# Patient Record
Sex: FEMALE | Race: WHITE | NOT HISPANIC OR LATINO | Employment: OTHER | URBAN - METROPOLITAN AREA
[De-identification: names, ages, dates, MRNs, and addresses within clinical notes are randomized per-mention and may not be internally consistent; named-entity substitution may affect disease eponyms.]

---

## 2019-09-21 ENCOUNTER — HOSPITAL ENCOUNTER (EMERGENCY)
Facility: HOSPITAL | Age: 84
Discharge: HOME | End: 2019-09-21
Attending: EMERGENCY MEDICINE | Admitting: EMERGENCY MEDICINE
Payer: MEDICARE

## 2019-09-21 ENCOUNTER — APPOINTMENT (EMERGENCY)
Dept: RADIOLOGY | Facility: HOSPITAL | Age: 84
End: 2019-09-21
Payer: MEDICARE

## 2019-09-21 VITALS
DIASTOLIC BLOOD PRESSURE: 65 MMHG | SYSTOLIC BLOOD PRESSURE: 133 MMHG | TEMPERATURE: 98.1 F | RESPIRATION RATE: 18 BRPM | OXYGEN SATURATION: 98 % | HEART RATE: 68 BPM

## 2019-09-21 DIAGNOSIS — M25.00 HEMARTHROSIS: ICD-10-CM

## 2019-09-21 DIAGNOSIS — S89.90XA INJURY, KNEE: Primary | ICD-10-CM

## 2019-09-21 PROCEDURE — 96375 TX/PRO/DX INJ NEW DRUG ADDON: CPT

## 2019-09-21 PROCEDURE — 73564 X-RAY EXAM KNEE 4 OR MORE: CPT

## 2019-09-21 PROCEDURE — 96374 THER/PROPH/DIAG INJ IV PUSH: CPT

## 2019-09-21 PROCEDURE — 99284 EMERGENCY DEPT VISIT MOD MDM: CPT

## 2019-09-21 RX ORDER — POTASSIUM CHLORIDE 750 MG/1
20 CAPSULE, EXTENDED RELEASE ORAL DAILY
COMMUNITY

## 2019-09-21 RX ORDER — LIDOCAINE HYDROCHLORIDE AND EPINEPHRINE 10; 10 MG/ML; UG/ML
1 INJECTION, SOLUTION INFILTRATION; PERINEURAL ONCE
Status: COMPLETED | OUTPATIENT
Start: 2019-09-21 | End: 2019-09-21

## 2019-09-21 RX ORDER — FUROSEMIDE 40 MG/1
40 TABLET ORAL 2 TIMES DAILY
Status: ON HOLD | COMMUNITY
End: 2021-07-12 | Stop reason: SDUPTHER

## 2019-09-21 RX ORDER — LORAZEPAM 2 MG/ML
0.5 INJECTION INTRAMUSCULAR ONCE
Status: COMPLETED | OUTPATIENT
Start: 2019-09-21 | End: 2019-09-21

## 2019-09-21 RX ADMIN — MORPHINE SULFATE 2 MG: 2 INJECTION, SOLUTION INTRAMUSCULAR; INTRAVENOUS at 08:25

## 2019-09-21 RX ADMIN — MORPHINE SULFATE 2 MG: 2 INJECTION, SOLUTION INTRAMUSCULAR; INTRAVENOUS at 08:48

## 2019-09-21 RX ADMIN — LIDOCAINE HYDROCHLORIDE AND EPINEPHRINE 1 ML: 10; 10 INJECTION, SOLUTION INFILTRATION; PERINEURAL at 11:30

## 2019-09-21 RX ADMIN — LORAZEPAM 0.5 MG: 2 INJECTION INTRAMUSCULAR; INTRAVENOUS at 09:37

## 2019-09-21 NOTE — ED NOTES
X ray at the bedside  Patient continue to thrash and moan in pain  Order for additional pain medication obtained       Padmini Santos RN  09/21/19 2485

## 2019-09-21 NOTE — ED PROVIDER NOTES
History  Chief Complaint   Patient presents with    Knee Injury     pt c/o l knee pain started this am, pt last seen fine walking normal was 0400 today per ems  pt lives with daughter and caregiver that stays with pt at night  Patient lives at home and has an overnight caretaker  Patient reportedly was up and ambulatory to the bathroom at 0400 hours today  This morning caregiver noted pain and swelling in the left knee  Patient states he has been that way for about an hour  She denies any fall or injury  She has no pain in any other extremity  She has not had a fever any recent rashes  Prior to Admission Medications   Prescriptions Last Dose Informant Patient Reported? Taking? DULoxetine HCl (CYMBALTA PO)   Yes Yes   Sig: Take 60 mg by mouth daily at bedtime   METOPROLOL TARTRATE PO   Yes Yes   Sig: Take 50 mg by mouth daily at bedtime   MIRTAZAPINE PO   Yes Yes   Sig: Take 7 5 mg by mouth every evening    furosemide (LASIX) 40 mg tablet   Yes Yes   Sig: Take 40 mg by mouth 2 (two) times a day   potassium chloride (MICRO-K) 10 MEQ CR capsule   Yes Yes   Sig: Take 20 mEq by mouth daily      Facility-Administered Medications: None       No past medical history on file  No past surgical history on file  No family history on file  I have reviewed and agree with the history as documented  Social History     Tobacco Use    Smoking status: Never Smoker   Substance Use Topics    Alcohol use: Never     Frequency: Never    Drug use: Never        Review of Systems   Constitutional: Negative for chills and fever  HENT: Negative for congestion and sore throat  Eyes: Negative for visual disturbance  Respiratory: Negative for shortness of breath  Cardiovascular: Negative for chest pain  Gastrointestinal: Negative for abdominal pain  Genitourinary: Negative for dysuria  Musculoskeletal: Positive for arthralgias and joint swelling  Negative for neck pain  Skin: Negative for rash  Neurological: Positive for weakness  Negative for numbness  Hematological: Does not bruise/bleed easily  Psychiatric/Behavioral: Positive for confusion  All other systems reviewed and are negative  Physical Exam  Physical Exam   Constitutional: She appears well-developed and well-nourished  HENT:   Head: Normocephalic and atraumatic  Mouth/Throat: Oropharynx is clear and moist    Eyes: Conjunctivae are normal    Neck: Normal range of motion  Neck supple  Cardiovascular: Normal rate, regular rhythm and normal heart sounds  Pulmonary/Chest: Effort normal    Abdominal: Soft  Bowel sounds are normal  There is no tenderness  Musculoskeletal: She exhibits tenderness and deformity  There is a swollen tender deformity of the left knee  Patient will not allow range of motion at the knee due to pain  The foot is neurovascularly intact   Neurological: She is alert  Skin: Skin is warm and dry  Capillary refill takes less than 2 seconds  Psychiatric: Her behavior is normal    Patient is anxious and hyperventilates at times but is able to control it herself   Nursing note and vitals reviewed        Vital Signs  ED Triage Vitals   Temperature Pulse Respirations Blood Pressure SpO2   09/21/19 0819 09/21/19 0819 09/21/19 0819 09/21/19 0819 09/21/19 0819   98 1 °F (36 7 °C) 92 20 (!) 207/92 98 %      Temp Source Heart Rate Source Patient Position - Orthostatic VS BP Location FiO2 (%)   09/21/19 0819 09/21/19 0819 09/21/19 0819 09/21/19 0819 --   Tympanic Monitor Sitting Right arm       Pain Score       09/21/19 0825       Worst Possible Pain           Vitals:    09/21/19 0819 09/21/19 0850 09/21/19 1022   BP: (!) 207/92 (!) 182/86 155/72   Pulse: 92 70 79   Patient Position - Orthostatic VS: Sitting Lying Lying         Visual Acuity      ED Medications  Medications   lidocaine-epinephrine (XYLOCAINE/EPINEPHRINE) 1 %-1:100,000 injection 1 mL (has no administration in time range)   morphine injection 2 mg (2 mg Intravenous Given 9/21/19 0119)   morphine injection 2 mg (2 mg Intravenous Given 9/21/19 1987)   LORazepam (ATIVAN) 2 mg/mL injection 0 5 mg (0 5 mg Intravenous Given 9/21/19 5920)       Diagnostic Studies  Results Reviewed     None                 XR knee 4+ views left injury   Final Result by Matteo Jaimes MD (09/21 0912)      No acute osseous abnormality  Large joint effusion  Tricompartmental degenerative and or CPPD arthropathy  Workstation performed: [id number]                    Procedures  Arthrocentesis  Date/Time: 9/21/2019 11:00 AM  Performed by: Yanique White MD  Authorized by: Yanique White MD     Location:  Bedside  Verbal consent obtained?: Yes    Risks and benefits: Risks, benefits and alternatives were discussed    Consent given by:  Guardian  Patient identity confirmed:  Arm band  Indications:  Joint swelling and pain  Body area:  Knee  Joint:  Left knee  Joint:  Left knee  Joint:  Left knee  Joint:  Left knee  Joint:  Left knee  Joint:  Left knee  Joint:  Left knee  Joint:  Left knee  Joint:  Left knee  Joint:  Left knee  Joint:  Left knee  Joint:  Left knee  Joint:  Left knee  Joint:  Left knee  Joint:  Left knee  Joint:  Left knee  Joint:  Left knee  Joint:  Left knee  Joint:  Left knee  Joint:  Left knee  Joint:  Left knee  Joint:  Left knee  Joint:  Left knee  Joint:  Left knee  Joint:  Left knee  Joint:  Left knee  Joint:  Left knee  Local anesthesia used?: Yes    Anesthesia:  Local infiltration  Local anesthetic:  Lidocaine 1% with epinephrine  Sedation type:   Anxiolysis  Preparation: Patient was prepped and draped in usual sterile fashion    Needle size:  18 G  Ultrasound guidance: No    Approach:  Lateral  Aspirate amount (ml):  40  Aspirate:  Bloody  Patient tolerance:  Patient tolerated the procedure well with no immediate complications           ED Course                               MDM  Number of Diagnoses or Management Options  Diagnosis management comments: I will medicate the patient for comfort and to evaluate better including x-ray  I discussed aspiration of the joint with the caregiver because the patient remained in significant amount of pain  40 cc of bloody drainage was removed without difficulty, and with immediate relief for the patient, who fell asleep shortly after  Disposition  Final diagnoses:   Injury, knee   Hemarthrosis     Time reflects when diagnosis was documented in both MDM as applicable and the Disposition within this note     Time User Action Codes Description Comment    9/21/2019 11:14 AM Mark LEWIS Add [S89 90XA] Injury, knee     9/21/2019 11:15 AM Eloise Ji Add [M25 00] Hemarthrosis       ED Disposition     ED Disposition Condition Date/Time Comment    Discharge Stable Sat Sep 21, 2019 11:14 AM Flores Flores discharge to home/self care  Follow-up Information     Follow up With Specialties Details Why   Christiano Patton MD Family Medicine   [facility] [address]  [phone]      Ricki Uriostegui,  Orthopedic Surgery Schedule an appointment as soon as possible for a visit in 1 day  [address] [address]  [phone]            Patient's Medications   Discharge Prescriptions    No medications on file     No discharge procedures on file      ED Provider  Electronically Signed by           Meño Irizarry MD  09/21/19 2064

## 2019-09-24 ENCOUNTER — APPOINTMENT (EMERGENCY)
Dept: RADIOLOGY | Facility: HOSPITAL | Age: 84
End: 2019-09-24
Payer: MEDICARE

## 2019-09-24 ENCOUNTER — HOSPITAL ENCOUNTER (EMERGENCY)
Facility: HOSPITAL | Age: 84
Discharge: HOME | End: 2019-09-24
Attending: EMERGENCY MEDICINE | Admitting: EMERGENCY MEDICINE
Payer: MEDICARE

## 2019-09-24 VITALS
OXYGEN SATURATION: 96 % | HEART RATE: 66 BPM | SYSTOLIC BLOOD PRESSURE: 173 MMHG | RESPIRATION RATE: 20 BRPM | TEMPERATURE: 97.1 F | DIASTOLIC BLOOD PRESSURE: 76 MMHG

## 2019-09-24 DIAGNOSIS — M25.562 PAIN AND SWELLING OF LEFT KNEE: Primary | ICD-10-CM

## 2019-09-24 DIAGNOSIS — M25.462 PAIN AND SWELLING OF LEFT KNEE: Primary | ICD-10-CM

## 2019-09-24 PROCEDURE — 73564 X-RAY EXAM KNEE 4 OR MORE: CPT

## 2019-09-24 PROCEDURE — 99284 EMERGENCY DEPT VISIT MOD MDM: CPT

## 2019-09-24 RX ORDER — TRAMADOL HYDROCHLORIDE 50 MG/1
50 TABLET ORAL ONCE
Status: COMPLETED | OUTPATIENT
Start: 2019-09-24 | End: 2019-09-24

## 2019-09-24 RX ORDER — HYDROMORPHONE HYDROCHLORIDE 2 MG/1
1 TABLET ORAL ONCE
Status: DISCONTINUED | OUTPATIENT
Start: 2019-09-24 | End: 2019-09-24

## 2019-09-24 RX ORDER — SENNOSIDES 8.6 MG
650 CAPSULE ORAL EVERY 8 HOURS PRN
Qty: 30 TABLET | Refills: 0 | Status: SHIPPED | OUTPATIENT
Start: 2019-09-24

## 2019-09-24 RX ORDER — ACETAMINOPHEN 325 MG/1
650 TABLET ORAL ONCE
Status: COMPLETED | OUTPATIENT
Start: 2019-09-24 | End: 2019-09-24

## 2019-09-24 RX ORDER — HYDROMORPHONE HYDROCHLORIDE 2 MG/1
2 TABLET ORAL ONCE
Status: COMPLETED | OUTPATIENT
Start: 2019-09-24 | End: 2019-09-24

## 2019-09-24 RX ORDER — TRAMADOL HYDROCHLORIDE 50 MG/1
50 TABLET ORAL EVERY 8 HOURS PRN
Qty: 30 TABLET | Refills: 0 | Status: SHIPPED | OUTPATIENT
Start: 2019-09-24 | End: 2019-10-04

## 2019-09-24 RX ADMIN — HYDROMORPHONE HYDROCHLORIDE 2 MG: 2 TABLET ORAL at 03:11

## 2019-09-24 RX ADMIN — ACETAMINOPHEN 650 MG: 325 TABLET, FILM COATED ORAL at 02:03

## 2019-09-24 RX ADMIN — TRAMADOL HYDROCHLORIDE 50 MG: 50 TABLET, FILM COATED ORAL at 01:39

## 2019-09-24 NOTE — ED PROVIDER NOTES
History  Chief Complaint   Patient presents with    Knee Pain     EMS state pt seen here recently, had swelling of left knee, had knee drained  Family called 911 because pt complaining of knee pain all night and knee looks swollen again  Pt with hx of alzheimers, can't say what is wrong but is complaining of knee pain on arrival     27-year-old female with past medical history of advanced Alzheimer's dementia, presents to the ER with complaint of left knee pain and swelling over the past 4-5 days  Patient lives with her son and daughter-in-law who are the medical POA  Daughter-in-law and two Home Health aide helps to take care of patient's daily activities  Patient was seen at our ER on 09/21/2019 for similar complaint  At that time arthrocentesis was done by the ED attending and about 40 cc of serosanguineous fluid obtain  Patient felt better after the drainage in patient was discharged home with Ace wrap in place  Daughter-in-law did not take off the Ace wrap until earlier today when patient started to complain of increasing pain  Further history is limited due to patient's advanced dementia  Daughter cannot recall any specific falls or injury to elicit the pain initially  Daughter was told to follow up with orthopedic surgery however she did not make an appointment yet  Noted denies patient having any fevers or chills  No warm to touch or redness noted on examination of left knee  Daughter states that patient's left knee looks similar to patient's previous ED visit however it is not as significant  History provided by:  Patient      Prior to Admission Medications   Prescriptions Last Dose Informant Patient Reported? Taking?    DULoxetine HCl (CYMBALTA PO)   Yes No   Sig: Take 60 mg by mouth daily at bedtime   METOPROLOL TARTRATE PO   Yes No   Sig: Take 50 mg by mouth daily at bedtime   MIRTAZAPINE PO   Yes No   Sig: Take 7 5 mg by mouth every evening    furosemide (LASIX) 40 mg tablet   Yes No Sig: Take 40 mg by mouth 2 (two) times a day   potassium chloride (MICRO-K) 10 MEQ CR capsule   Yes No   Sig: Take 20 mEq by mouth daily      Facility-Administered Medications: None       Past Medical History:   Diagnosis Date    Alzheimer disease        History reviewed  No pertinent surgical history  History reviewed  No pertinent family history  I have reviewed and agree with the history as documented  Social History     Tobacco Use    Smoking status: Never Smoker   Substance Use Topics    Alcohol use: Never     Frequency: Never    Drug use: Never        Review of Systems   Constitutional: Negative for activity change, appetite change, chills and fever  HENT: Negative for congestion and ear pain  Eyes: Negative for pain and discharge  Respiratory: Negative for cough, chest tightness, shortness of breath, wheezing and stridor  Cardiovascular: Negative for chest pain and palpitations  Gastrointestinal: Negative for abdominal distention, abdominal pain, constipation, diarrhea and nausea  Endocrine: Negative for cold intolerance  Genitourinary: Negative for dysuria, frequency and urgency  Musculoskeletal: Positive for arthralgias  Negative for back pain  Skin: Negative for color change and rash  Allergic/Immunologic: Negative for environmental allergies and food allergies  Neurological: Negative for dizziness, weakness, numbness and headaches  Hematological: Negative for adenopathy  Psychiatric/Behavioral: Negative for agitation, behavioral problems and confusion  The patient is not nervous/anxious  All other systems reviewed and are negative  Physical Exam  Physical Exam   Constitutional: She appears well-developed and well-nourished  HENT:   Head: Normocephalic and atraumatic  Mouth/Throat: Oropharynx is clear and moist    Eyes: Conjunctivae and EOM are normal    Neck: Normal range of motion  Neck supple     Cardiovascular: Normal rate, regular rhythm, normal heart sounds and intact distal pulses  Pulmonary/Chest: Effort normal and breath sounds normal    Abdominal: Soft  Bowel sounds are normal  She exhibits no distension  There is no tenderness  Musculoskeletal: Normal range of motion  Pulses intact bilateral lower extremities  Swollen left knee noted in comparison to right knee  No erythema, warm to touch, skin changes noted on examination of left knee  Very small bruising noted to the lateral aspect of the knee from previous arthrocentesis needle insertion site  Range of motion of knee is limited secondary to pain  Neurological: She is alert  Patient is alert to self only that is baseline for her secondary to her advanced Alzheimer's dementia  Skin: Skin is warm and dry  Psychiatric: She has a normal mood and affect  Her behavior is normal  Judgment and thought content normal    Nursing note and vitals reviewed        Vital Signs  ED Triage Vitals   Temperature Pulse Respirations Blood Pressure SpO2   09/24/19 0121 09/24/19 0122 09/24/19 0122 09/24/19 0122 09/24/19 0122   (!) 97 1 °F (36 2 °C) 70 20 (!) 176/81 91 %      Temp Source Heart Rate Source Patient Position - Orthostatic VS BP Location FiO2 (%)   09/24/19 0121 -- 09/24/19 0122 09/24/19 0122 --   Tympanic  Lying Right arm       Pain Score       09/24/19 0139       Worst Possible Pain           Vitals:    09/24/19 0122 09/24/19 0130 09/24/19 0145 09/24/19 0200   BP: (!) 176/81 (!) 176/81     Pulse: 70 66 66 68   Patient Position - Orthostatic VS: Lying            Visual Acuity      ED Medications  Medications   traMADol (ULTRAM) tablet 50 mg (50 mg Oral Given 9/24/19 0139)   acetaminophen (TYLENOL) tablet 650 mg (650 mg Oral Given 9/24/19 0203)   HYDROmorphone (DILAUDID) tablet 2 mg (2 mg Oral Given 9/24/19 5801)       Diagnostic Studies  Results Reviewed     None                 XR knee 4+ views left injury    (Results Pending)              Procedures  Procedures       ED Course MDM  Number of Diagnoses or Management Options  Pain and swelling of left knee: new and requires workup  Diagnosis management comments: Obtain x-ray of left knee to rule out any acute bony abnormality  Give Tylenol and Ultram for pain       Amount and/or Complexity of Data Reviewed  Tests in the radiology section of CPT®: ordered and reviewed    Risk of Complications, Morbidity, and/or Mortality  General comments: Patient's vital signs were unremarkable in the ED  Patient is not febrile  No erythema, warm to touch noted on examination of left knee  Patient is having recurrent swelling of the left knee  At this time Ace wrap applied and patient is discharged home on p r n  Tramadol and Tylenol with follow-up to orthopedic surgery as soon as possible  I also discussed palliative care with patient's POA as patient is having worsening dementia over the past few months  At this time, daughter brittney states that she would like patient to be comfortable and her pain controlled  She also agrees that it may be time to discuss palliative care options  Patient does have an appointment with PCP later this week where daughter brittney will discuss these options  Patient's medical POA, daughter-in-law, verbalizes understanding of discharge plans  Close return instructions given to return to the ED for any worsening symptoms or concerns      Patient Progress  Patient progress: stable      Disposition  Final diagnoses:   Pain and swelling of left knee     Time reflects when diagnosis was documented in both MDM as applicable and the Disposition within this note     Time User Action Codes Description Comment    9/24/2019  2:01 AM Ayah Silverman Add [M25 562] Left knee pain     9/24/2019  2:01 AM Helen Rushing Add [M20 562,  M23 462] Pain and swelling of left knee     9/24/2019  2:02 AM Ayah Silverman Modify [A77 987,  M25 462] Pain and swelling of left knee     9/24/2019  2:02 AM Ayah Silverman Remove [M25 562] Left knee pain       ED Disposition     ED Disposition Condition Date/Time Comment    Discharge Stable Tue Sep 24, 2019  2:09 AM Barb Ibanez discharge to home with family  Follow-up Information     Follow up With Specialties Details Why Yue Childress 72 , DO Orthopedic Surgery  Please call the office in the morning to schedule an appointment as soon as possible  [address]            Patient's Medications   Discharge Prescriptions    ACETAMINOPHEN (TYLENOL) 650 MG CR TABLET    Take 1 tablet (650 mg total) by mouth every 8 (eight) hours as needed for mild pain       Start Date: 9/24/2019 End Date: --       Order Dose: 650 mg       Quantity: 30 tablet    Refills: 0    TRAMADOL (ULTRAM) 50 MG TABLET    Take 1 tablet (50 mg total) by mouth every 8 (eight) hours as needed for moderate pain for up to 10 days       Start Date: 9/24/2019 End Date: 10/4/2019       Order Dose: 50 mg       Quantity: 30 tablet    Refills: 0     No discharge procedures on file      ED Provider  Electronically Signed by           Anne Marie Lyons DO  09/24/19 608 [facility],   09/24/19 8871

## 2019-11-03 ENCOUNTER — APPOINTMENT (EMERGENCY)
Dept: RADIOLOGY | Facility: HOSPITAL | Age: 84
End: 2019-11-03
Payer: MEDICARE

## 2019-11-03 ENCOUNTER — ANESTHESIA EVENT (INPATIENT)
Dept: PERIOP | Facility: HOSPITAL | Age: 84
DRG: 115 | End: 2019-11-03
Payer: MEDICARE

## 2019-11-03 ENCOUNTER — APPOINTMENT (EMERGENCY)
Dept: RADIOLOGY | Facility: HOSPITAL | Age: 84
DRG: 115 | End: 2019-11-03
Payer: MEDICARE

## 2019-11-03 ENCOUNTER — ANESTHESIA (INPATIENT)
Dept: PERIOP | Facility: HOSPITAL | Age: 84
DRG: 115 | End: 2019-11-03
Payer: MEDICARE

## 2019-11-03 ENCOUNTER — HOSPITAL ENCOUNTER (EMERGENCY)
Facility: HOSPITAL | Age: 84
End: 2019-11-03
Attending: EMERGENCY MEDICINE | Admitting: EMERGENCY MEDICINE
Payer: MEDICARE

## 2019-11-03 ENCOUNTER — HOSPITAL ENCOUNTER (INPATIENT)
Facility: HOSPITAL | Age: 84
LOS: 3 days | Discharge: HOME | DRG: 115 | End: 2019-11-06
Attending: HOSPITALIST | Admitting: HOSPITALIST
Payer: MEDICARE

## 2019-11-03 VITALS
SYSTOLIC BLOOD PRESSURE: 122 MMHG | HEART RATE: 67 BPM | TEMPERATURE: 97.7 F | DIASTOLIC BLOOD PRESSURE: 77 MMHG | RESPIRATION RATE: 20 BRPM | WEIGHT: 188 LBS | OXYGEN SATURATION: 97 %

## 2019-11-03 DIAGNOSIS — J01.20 ACUTE ETHMOIDAL SINUSITIS, RECURRENCE NOT SPECIFIED: ICD-10-CM

## 2019-11-03 DIAGNOSIS — L02.01 FACIAL ABSCESS: ICD-10-CM

## 2019-11-03 DIAGNOSIS — L03.213 PERIORBITAL CELLULITIS OF RIGHT EYE: Primary | ICD-10-CM

## 2019-11-03 DIAGNOSIS — R59.1 LYMPHADENOPATHY: ICD-10-CM

## 2019-11-03 DIAGNOSIS — Z20.818 MRSA EXPOSURE: ICD-10-CM

## 2019-11-03 DIAGNOSIS — H05.011: Primary | ICD-10-CM

## 2019-11-03 PROBLEM — M10.9 GOUT: Status: ACTIVE | Noted: 2019-11-03

## 2019-11-03 PROBLEM — R91.1 LESION OF LUNG: Status: ACTIVE | Noted: 2019-11-03

## 2019-11-03 PROBLEM — I10 HYPERTENSION: Status: ACTIVE | Noted: 2019-11-03

## 2019-11-03 PROBLEM — M40.209 KYPHOSIS: Status: ACTIVE | Noted: 2019-11-03

## 2019-11-03 PROBLEM — I50.9 CHRONIC CONGESTIVE HEART FAILURE (HCC): Status: ACTIVE | Noted: 2019-11-03

## 2019-11-03 PROBLEM — F03.90 DEMENTIA (HCC): Status: ACTIVE | Noted: 2019-11-03

## 2019-11-03 PROBLEM — I48.91 A-FIB (HCC): Status: ACTIVE | Noted: 2019-11-03

## 2019-11-03 LAB
ALBUMIN SERPL BCP-MCNC: 3.1 G/DL (ref 3.5–5)
ALP SERPL-CCNC: 122 U/L (ref 46–116)
ALT SERPL W P-5'-P-CCNC: 14 U/L (ref 12–78)
ANION GAP SERPL CALCULATED.3IONS-SCNC: 8 MMOL/L (ref 4–13)
APTT PPP: 28 SECONDS (ref 23–37)
AST SERPL W P-5'-P-CCNC: 12 U/L (ref 5–45)
ATRIAL RATE: 61 BPM
ATRIAL RATE: 71 BPM
BASOPHILS # BLD AUTO: 0.02 THOUSANDS/ΜL (ref 0–0.1)
BASOPHILS NFR BLD AUTO: 0 % (ref 0–1)
BILIRUB SERPL-MCNC: 0.9 MG/DL (ref 0.2–1)
BUN SERPL-MCNC: 20 MG/DL (ref 5–25)
CALCIUM SERPL-MCNC: 8.8 MG/DL (ref 8.3–10.1)
CHLORIDE SERPL-SCNC: 99 MMOL/L (ref 100–108)
CO2 SERPL-SCNC: 30 MMOL/L (ref 21–32)
CREAT SERPL-MCNC: 1.08 MG/DL (ref 0.6–1.3)
EOSINOPHIL # BLD AUTO: 0.14 THOUSAND/ΜL (ref 0–0.61)
EOSINOPHIL NFR BLD AUTO: 2 % (ref 0–6)
ERYTHROCYTE [DISTWIDTH] IN BLOOD BY AUTOMATED COUNT: 13.3 % (ref 11.6–15.1)
GFR SERPL CREATININE-BSD FRML MDRD: 44 ML/MIN/1.73SQ M
GLUCOSE SERPL-MCNC: 122 MG/DL (ref 65–140)
HCT VFR BLD AUTO: 40.7 % (ref 34.8–46.1)
HGB BLD-MCNC: 12.9 G/DL (ref 11.5–15.4)
HOLD SPECIMEN: NORMAL
IMM GRANULOCYTES # BLD AUTO: 0.04 THOUSAND/UL (ref 0–0.2)
IMM GRANULOCYTES NFR BLD AUTO: 0 % (ref 0–2)
INR PPP: 0.99 (ref 0.91–1.09)
LYMPHOCYTES # BLD AUTO: 3.05 THOUSANDS/ΜL (ref 0.6–4.47)
LYMPHOCYTES NFR BLD AUTO: 34 % (ref 14–44)
MCH RBC QN AUTO: 33.5 PG (ref 26.8–34.3)
MCHC RBC AUTO-ENTMCNC: 31.7 G/DL (ref 31.4–37.4)
MCV RBC AUTO: 106 FL (ref 82–98)
MONOCYTES # BLD AUTO: 0.58 THOUSAND/ΜL (ref 0.17–1.22)
MONOCYTES NFR BLD AUTO: 6 % (ref 4–12)
NEUTROPHILS # BLD AUTO: 5.27 THOUSANDS/ΜL (ref 1.85–7.62)
NEUTS SEG NFR BLD AUTO: 58 % (ref 43–75)
NRBC BLD AUTO-RTO: 0 /100 WBCS
P AXIS: 140 DEGREES
PLATELET # BLD AUTO: 215 THOUSANDS/UL (ref 149–390)
PMV BLD AUTO: 11 FL (ref 8.9–12.7)
POTASSIUM SERPL-SCNC: 4.5 MMOL/L (ref 3.5–5.3)
PROT SERPL-MCNC: 7.7 G/DL (ref 6.4–8.2)
PROTHROMBIN TIME: 10.7 SECONDS (ref 9.8–12)
QRS AXIS: 111 DEGREES
QRS AXIS: 113 DEGREES
QRSD INTERVAL: 82 MS
QRSD INTERVAL: 88 MS
QT INTERVAL: 398 MS
QT INTERVAL: 418 MS
QTC INTERVAL: 423 MS
QTC INTERVAL: 424 MS
RBC # BLD AUTO: 3.85 MILLION/UL (ref 3.81–5.12)
SODIUM SERPL-SCNC: 137 MMOL/L (ref 136–145)
T WAVE AXIS: -2 DEGREES
T WAVE AXIS: 19 DEGREES
VENTRICULAR RATE: 62 BPM
VENTRICULAR RATE: 68 BPM
WBC # BLD AUTO: 9.1 THOUSAND/UL (ref 4.31–10.16)

## 2019-11-03 PROCEDURE — 87147 CULTURE TYPE IMMUNOLOGIC: CPT | Performed by: EMERGENCY MEDICINE

## 2019-11-03 PROCEDURE — 88365 INSITU HYBRIDIZATION (FISH): CPT | Performed by: PATHOLOGY

## 2019-11-03 PROCEDURE — 089X3ZZ DRAINAGE OF RIGHT LACRIMAL DUCT, PERCUTANEOUS APPROACH: ICD-10-PCS | Performed by: OTOLARYNGOLOGY

## 2019-11-03 PROCEDURE — 88342 IMHCHEM/IMCYTCHM 1ST ANTB: CPT | Performed by: PATHOLOGY

## 2019-11-03 PROCEDURE — 87070 CULTURE OTHR SPECIMN AEROBIC: CPT | Performed by: EMERGENCY MEDICINE

## 2019-11-03 PROCEDURE — 85610 PROTHROMBIN TIME: CPT | Performed by: EMERGENCY MEDICINE

## 2019-11-03 PROCEDURE — 88311 DECALCIFY TISSUE: CPT | Performed by: PATHOLOGY

## 2019-11-03 PROCEDURE — 93010 ELECTROCARDIOGRAM REPORT: CPT | Performed by: INTERNAL MEDICINE

## 2019-11-03 PROCEDURE — 96366 THER/PROPH/DIAG IV INF ADDON: CPT

## 2019-11-03 PROCEDURE — 88341 IMHCHEM/IMCYTCHM EA ADD ANTB: CPT | Performed by: PATHOLOGY

## 2019-11-03 PROCEDURE — 87205 SMEAR GRAM STAIN: CPT | Performed by: EMERGENCY MEDICINE

## 2019-11-03 PROCEDURE — 87040 BLOOD CULTURE FOR BACTERIA: CPT | Performed by: HOSPITALIST

## 2019-11-03 PROCEDURE — 68720 CREATE TEAR SAC DRAIN: CPT | Performed by: OTOLARYNGOLOGY

## 2019-11-03 PROCEDURE — 93005 ELECTROCARDIOGRAM TRACING: CPT

## 2019-11-03 PROCEDURE — 09BU8ZZ EXCISION OF RIGHT ETHMOID SINUS, VIA NATURAL OR ARTIFICIAL OPENING ENDOSCOPIC: ICD-10-PCS | Performed by: OTOLARYNGOLOGY

## 2019-11-03 PROCEDURE — 36415 COLL VENOUS BLD VENIPUNCTURE: CPT | Performed by: HOSPITALIST

## 2019-11-03 PROCEDURE — 80053 COMPREHEN METABOLIC PANEL: CPT | Performed by: EMERGENCY MEDICINE

## 2019-11-03 PROCEDURE — 96367 TX/PROPH/DG ADDL SEQ IV INF: CPT

## 2019-11-03 PROCEDURE — 87186 SC STD MICRODIL/AGAR DIL: CPT | Performed by: OTOLARYNGOLOGY

## 2019-11-03 PROCEDURE — 87147 CULTURE TYPE IMMUNOLOGIC: CPT | Performed by: OTOLARYNGOLOGY

## 2019-11-03 PROCEDURE — 88305 TISSUE EXAM BY PATHOLOGIST: CPT | Performed by: PATHOLOGY

## 2019-11-03 PROCEDURE — 87075 CULTR BACTERIA EXCEPT BLOOD: CPT | Performed by: OTOLARYNGOLOGY

## 2019-11-03 PROCEDURE — 85730 THROMBOPLASTIN TIME PARTIAL: CPT | Performed by: EMERGENCY MEDICINE

## 2019-11-03 PROCEDURE — 61782 SCAN PROC CRANIAL EXTRA: CPT | Performed by: OTOLARYNGOLOGY

## 2019-11-03 PROCEDURE — 87070 CULTURE OTHR SPECIMN AEROBIC: CPT | Performed by: OTOLARYNGOLOGY

## 2019-11-03 PROCEDURE — 99223 1ST HOSP IP/OBS HIGH 75: CPT | Performed by: INTERNAL MEDICINE

## 2019-11-03 PROCEDURE — 081X3Z3 BYPASS RIGHT LACRIMAL DUCT TO NASAL CAVITY, PERCUTANEOUS APPROACH: ICD-10-PCS | Performed by: OTOLARYNGOLOGY

## 2019-11-03 PROCEDURE — 85025 COMPLETE CBC W/AUTO DIFF WBC: CPT | Performed by: EMERGENCY MEDICINE

## 2019-11-03 PROCEDURE — 36415 COLL VENOUS BLD VENIPUNCTURE: CPT | Performed by: EMERGENCY MEDICINE

## 2019-11-03 PROCEDURE — 96365 THER/PROPH/DIAG IV INF INIT: CPT

## 2019-11-03 PROCEDURE — 1124F ACP DISCUSS-NO DSCNMKR DOCD: CPT | Performed by: FAMILY MEDICINE

## 2019-11-03 PROCEDURE — 87186 SC STD MICRODIL/AGAR DIL: CPT | Performed by: EMERGENCY MEDICINE

## 2019-11-03 PROCEDURE — 87205 SMEAR GRAM STAIN: CPT | Performed by: OTOLARYNGOLOGY

## 2019-11-03 PROCEDURE — 87081 CULTURE SCREEN ONLY: CPT | Performed by: HOSPITALIST

## 2019-11-03 PROCEDURE — 99285 EMERGENCY DEPT VISIT HI MDM: CPT

## 2019-11-03 PROCEDURE — 71045 X-RAY EXAM CHEST 1 VIEW: CPT

## 2019-11-03 PROCEDURE — 70488 CT MAXILLOFACIAL W/O & W/DYE: CPT

## 2019-11-03 PROCEDURE — 10060 I&D ABSCESS SIMPLE/SINGLE: CPT | Performed by: OTOLARYNGOLOGY

## 2019-11-03 PROCEDURE — 31254 NSL/SINS NDSC W/PRTL ETHMDCT: CPT | Performed by: OTOLARYNGOLOGY

## 2019-11-03 PROCEDURE — 88364 INSITU HYBRIDIZATION (FISH): CPT | Performed by: PATHOLOGY

## 2019-11-03 PROCEDURE — 99221 1ST HOSP IP/OBS SF/LOW 40: CPT | Performed by: OTOLARYNGOLOGY

## 2019-11-03 PROCEDURE — 99222 1ST HOSP IP/OBS MODERATE 55: CPT | Performed by: HOSPITALIST

## 2019-11-03 PROCEDURE — 99284 EMERGENCY DEPT VISIT MOD MDM: CPT

## 2019-11-03 RX ORDER — PROPOFOL 10 MG/ML
INJECTION, EMULSION INTRAVENOUS AS NEEDED
Status: DISCONTINUED | OUTPATIENT
Start: 2019-11-03 | End: 2019-11-03 | Stop reason: SURG

## 2019-11-03 RX ORDER — ONDANSETRON 2 MG/ML
INJECTION INTRAMUSCULAR; INTRAVENOUS AS NEEDED
Status: DISCONTINUED | OUTPATIENT
Start: 2019-11-03 | End: 2019-11-03 | Stop reason: SURG

## 2019-11-03 RX ORDER — ACETAMINOPHEN 325 MG/1
650 TABLET ORAL EVERY 6 HOURS PRN
Status: DISCONTINUED | OUTPATIENT
Start: 2019-11-03 | End: 2019-11-06 | Stop reason: HOSPADM

## 2019-11-03 RX ORDER — BACITRACIN 500 [USP'U]/G
OINTMENT OPHTHALMIC 2 TIMES DAILY
Status: DISCONTINUED | OUTPATIENT
Start: 2019-11-03 | End: 2019-11-06 | Stop reason: HOSPADM

## 2019-11-03 RX ORDER — METOPROLOL TARTRATE 50 MG/1
50 TABLET, FILM COATED ORAL
Status: DISCONTINUED | OUTPATIENT
Start: 2019-11-03 | End: 2019-11-06 | Stop reason: HOSPADM

## 2019-11-03 RX ORDER — MIRTAZAPINE 15 MG/1
15 TABLET, FILM COATED ORAL
Status: DISCONTINUED | OUTPATIENT
Start: 2019-11-03 | End: 2019-11-06 | Stop reason: HOSPADM

## 2019-11-03 RX ORDER — DULOXETIN HYDROCHLORIDE 60 MG/1
60 CAPSULE, DELAYED RELEASE ORAL
Status: DISCONTINUED | OUTPATIENT
Start: 2019-11-03 | End: 2019-11-06 | Stop reason: HOSPADM

## 2019-11-03 RX ORDER — OXYMETAZOLINE HYDROCHLORIDE 0.05 G/100ML
2 SPRAY NASAL EVERY 12 HOURS SCHEDULED
Status: DISCONTINUED | OUTPATIENT
Start: 2019-11-03 | End: 2019-11-04

## 2019-11-03 RX ORDER — BALANCED SALT SOLUTION 6.4; .75; .48; .3; 3.9; 1.7 MG/ML; MG/ML; MG/ML; MG/ML; MG/ML; MG/ML
SOLUTION OPHTHALMIC AS NEEDED
Status: DISCONTINUED | OUTPATIENT
Start: 2019-11-03 | End: 2019-11-03 | Stop reason: HOSPADM

## 2019-11-03 RX ORDER — DEXAMETHASONE SODIUM PHOSPHATE 4 MG/ML
10 INJECTION, SOLUTION INTRA-ARTICULAR; INTRALESIONAL; INTRAMUSCULAR; INTRAVENOUS; SOFT TISSUE ONCE
Status: COMPLETED | OUTPATIENT
Start: 2019-11-03 | End: 2019-11-03

## 2019-11-03 RX ORDER — FENTANYL CITRATE/PF 50 MCG/ML
25 SYRINGE (ML) INJECTION
Status: DISCONTINUED | OUTPATIENT
Start: 2019-11-03 | End: 2019-11-03 | Stop reason: HOSPADM

## 2019-11-03 RX ORDER — SODIUM CHLORIDE 9 MG/ML
INJECTION, SOLUTION INTRAVENOUS CONTINUOUS PRN
Status: DISCONTINUED | OUTPATIENT
Start: 2019-11-03 | End: 2019-11-03 | Stop reason: SURG

## 2019-11-03 RX ORDER — AMOXICILLIN AND CLAVULANATE POTASSIUM 875; 125 MG/1; MG/1
1 TABLET, FILM COATED ORAL EVERY 12 HOURS SCHEDULED
COMMUNITY
End: 2019-11-06 | Stop reason: HOSPADM

## 2019-11-03 RX ORDER — LIDOCAINE HYDROCHLORIDE AND EPINEPHRINE 10; 10 MG/ML; UG/ML
INJECTION, SOLUTION INFILTRATION; PERINEURAL AS NEEDED
Status: DISCONTINUED | OUTPATIENT
Start: 2019-11-03 | End: 2019-11-03 | Stop reason: HOSPADM

## 2019-11-03 RX ORDER — ONDANSETRON 2 MG/ML
4 INJECTION INTRAMUSCULAR; INTRAVENOUS ONCE AS NEEDED
Status: DISCONTINUED | OUTPATIENT
Start: 2019-11-03 | End: 2019-11-03 | Stop reason: HOSPADM

## 2019-11-03 RX ORDER — CLINDAMYCIN PHOSPHATE 600 MG/50ML
600 INJECTION INTRAVENOUS EVERY 8 HOURS
Status: DISCONTINUED | OUTPATIENT
Start: 2019-11-03 | End: 2019-11-06

## 2019-11-03 RX ORDER — CLINDAMYCIN PHOSPHATE 600 MG/50ML
600 INJECTION INTRAVENOUS ONCE
Status: COMPLETED | OUTPATIENT
Start: 2019-11-03 | End: 2019-11-03

## 2019-11-03 RX ORDER — METOCLOPRAMIDE HYDROCHLORIDE 5 MG/ML
5 INJECTION INTRAMUSCULAR; INTRAVENOUS ONCE AS NEEDED
Status: DISCONTINUED | OUTPATIENT
Start: 2019-11-03 | End: 2019-11-03 | Stop reason: HOSPADM

## 2019-11-03 RX ORDER — OXYMETAZOLINE HYDROCHLORIDE 0.05 G/100ML
2 SPRAY NASAL EVERY 12 HOURS SCHEDULED
Status: DISCONTINUED | OUTPATIENT
Start: 2019-11-03 | End: 2019-11-03

## 2019-11-03 RX ORDER — OXYMETAZOLINE HYDROCHLORIDE 0.05 G/100ML
SPRAY NASAL AS NEEDED
Status: DISCONTINUED | OUTPATIENT
Start: 2019-11-03 | End: 2019-11-03 | Stop reason: HOSPADM

## 2019-11-03 RX ORDER — FENTANYL CITRATE 50 UG/ML
INJECTION, SOLUTION INTRAMUSCULAR; INTRAVENOUS AS NEEDED
Status: DISCONTINUED | OUTPATIENT
Start: 2019-11-03 | End: 2019-11-03 | Stop reason: SURG

## 2019-11-03 RX ORDER — DEXAMETHASONE SODIUM PHOSPHATE 10 MG/ML
INJECTION, SOLUTION INTRAMUSCULAR; INTRAVENOUS AS NEEDED
Status: DISCONTINUED | OUTPATIENT
Start: 2019-11-03 | End: 2019-11-03 | Stop reason: SURG

## 2019-11-03 RX ORDER — SODIUM CHLORIDE 9 MG/ML
50 INJECTION, SOLUTION INTRAVENOUS CONTINUOUS
Status: DISCONTINUED | OUTPATIENT
Start: 2019-11-03 | End: 2019-11-04

## 2019-11-03 RX ORDER — MAGNESIUM HYDROXIDE 1200 MG/15ML
LIQUID ORAL AS NEEDED
Status: DISCONTINUED | OUTPATIENT
Start: 2019-11-03 | End: 2019-11-03 | Stop reason: HOSPADM

## 2019-11-03 RX ORDER — SUCCINYLCHOLINE/SOD CL,ISO/PF 100 MG/5ML
SYRINGE (ML) INTRAVENOUS AS NEEDED
Status: DISCONTINUED | OUTPATIENT
Start: 2019-11-03 | End: 2019-11-03 | Stop reason: SURG

## 2019-11-03 RX ORDER — TOBRAMYCIN AND DEXAMETHASONE 3; 1 MG/ML; MG/ML
1 SUSPENSION/ DROPS OPHTHALMIC EVERY 6 HOURS SCHEDULED
Status: DISCONTINUED | OUTPATIENT
Start: 2019-11-03 | End: 2019-11-03 | Stop reason: HOSPADM

## 2019-11-03 RX ORDER — LIDOCAINE HYDROCHLORIDE 10 MG/ML
INJECTION, SOLUTION INFILTRATION; PERINEURAL AS NEEDED
Status: DISCONTINUED | OUTPATIENT
Start: 2019-11-03 | End: 2019-11-03 | Stop reason: SURG

## 2019-11-03 RX ORDER — ONDANSETRON 2 MG/ML
4 INJECTION INTRAMUSCULAR; INTRAVENOUS EVERY 6 HOURS PRN
Status: DISCONTINUED | OUTPATIENT
Start: 2019-11-03 | End: 2019-11-06 | Stop reason: HOSPADM

## 2019-11-03 RX ADMIN — FENTANYL CITRATE 25 MCG: 50 INJECTION, SOLUTION INTRAMUSCULAR; INTRAVENOUS at 15:28

## 2019-11-03 RX ADMIN — DEXAMETHASONE SODIUM PHOSPHATE 10 MG: 10 INJECTION, SOLUTION INTRAMUSCULAR; INTRAVENOUS at 15:43

## 2019-11-03 RX ADMIN — CLINDAMYCIN PHOSPHATE 600 MG: 600 INJECTION, SOLUTION INTRAVENOUS at 06:59

## 2019-11-03 RX ADMIN — LIDOCAINE HYDROCHLORIDE 50 MG: 10 INJECTION, SOLUTION INFILTRATION; PERINEURAL at 15:04

## 2019-11-03 RX ADMIN — Medication 80 MG: at 15:04

## 2019-11-03 RX ADMIN — FENTANYL CITRATE 50 MCG: 50 INJECTION, SOLUTION INTRAMUSCULAR; INTRAVENOUS at 15:04

## 2019-11-03 RX ADMIN — PHENYLEPHRINE HYDROCHLORIDE 100 MCG: 10 INJECTION INTRAVENOUS at 15:07

## 2019-11-03 RX ADMIN — CLINDAMYCIN PHOSPHATE 600 MG: 600 INJECTION, SOLUTION INTRAVENOUS at 21:36

## 2019-11-03 RX ADMIN — SODIUM CHLORIDE: 0.9 INJECTION, SOLUTION INTRAVENOUS at 14:52

## 2019-11-03 RX ADMIN — IODIXANOL 85 ML: 320 INJECTION, SOLUTION INTRAVASCULAR at 06:54

## 2019-11-03 RX ADMIN — CLINDAMYCIN PHOSPHATE 600 MG: 600 INJECTION, SOLUTION INTRAVENOUS at 13:59

## 2019-11-03 RX ADMIN — PROPOFOL 100 MG: 10 INJECTION, EMULSION INTRAVENOUS at 15:04

## 2019-11-03 RX ADMIN — DEXTRAN 70 AND HYPROMELLOSE 2910 1 DROP: 1; 3 SOLUTION/ DROPS OPHTHALMIC at 19:34

## 2019-11-03 RX ADMIN — DEXAMETHASONE SODIUM PHOSPHATE 10 MG: 4 INJECTION, SOLUTION INTRAMUSCULAR; INTRAVENOUS at 13:55

## 2019-11-03 RX ADMIN — SODIUM CHLORIDE 50 ML/HR: 0.9 INJECTION, SOLUTION INTRAVENOUS at 13:59

## 2019-11-03 RX ADMIN — VANCOMYCIN HYDROCHLORIDE 1250 MG: 1 INJECTION, POWDER, LYOPHILIZED, FOR SOLUTION INTRAVENOUS at 03:14

## 2019-11-03 RX ADMIN — OXYMETAZOLINE HYDROCHLORIDE 2 SPRAY: 0.05 SPRAY NASAL at 22:33

## 2019-11-03 RX ADMIN — DEXTRAN 70 AND HYPROMELLOSE 2910 1 DROP: 1; 3 SOLUTION/ DROPS OPHTHALMIC at 21:41

## 2019-11-03 RX ADMIN — ONDANSETRON 4 MG: 2 INJECTION INTRAMUSCULAR; INTRAVENOUS at 15:43

## 2019-11-03 RX ADMIN — FENTANYL CITRATE 25 MCG: 50 INJECTION, SOLUTION INTRAMUSCULAR; INTRAVENOUS at 15:22

## 2019-11-03 NOTE — INTERIM OP NOTE
INCISION AND DRAINAGE  (I&D) right periorbital abscess; right dacryocystorhinostomy/dilation of nasolacrimal duct, Bilateral nasal endoscopy, right anterior ethmoidectomy, image guidance    Postoperative Note  PATIENT NAME: Capo Esquivel  : 1925  MRN: [id number]  BE OR ROOM 07    Surgery Date: 11/3/2019    Preop Diagnosis:  Abscess of right periorbital region[H05 011]  Acute right ethmoidal sinusitis, recurrence not specified [J01 20]    Post-Op Diagnosis Codes:   SAME    Procedure(s) (LRB):  Right anterior ethmoidectomy  Bilateral nasal endoscopy, image guidance  Incision and drainage right periorbital abscess  Right dacryocystorhinostomy    Surgeon(s) and Role:     * Humberto Leslie MD - Primary    Specimens:  ID Type Source Tests Collected by Time Destination   1 : right ethmoid sinus contents Tissue Ethmoid Sinus TISSUE EXAM Humberto Leslie MD 11/3/2019 1605    A : right bao orbital abscess Body Fluid Other ANAEROBIC CULTURE AND GRAM STAIN, BODY FLUID CULTURE AND GRAM STAIN Humberto Leslie MD 11/3/2019 1532        Estimated Blood Loss:   20 mL    Anesthesia Type:   General    Findings:   Right preseptal cellulitis with associated abscess including superficial and deeper components drainage, irrigated with copious normal saline, cultures taken  Right ethmoidectomy to clear adjacent concerning ethmoid air cells, no purulence encountered endonasal    Complications:   None    SIGNATURE: Humberto Leslie MD   DATE: November 3, 2019   TIME: 4:40 PM

## 2019-11-03 NOTE — ASSESSMENT & PLAN NOTE
· Patient with significant baseline dementia according being to the daughter-in-law hold the POA    Patient is confused for more time then being oriented  · Is with son and daughter-in-law daughter-in-law is the primary caregiver  · Continue home Cymbalta 60 mg HS and Remeron 15 mg HS  · Monitor for delirium and confusion  · If needed may have to consider one-to-one observer

## 2019-11-03 NOTE — ED NOTES
SLIM paged and returned page at this time   Aware that patient is in ED room 137 [facility] Reidlatrice Lindsey, [address] [place] [address]  11/03/19 1015

## 2019-11-03 NOTE — ASSESSMENT & PLAN NOTE
· CT facial bones:  Evidence of right dacrocystitis  with associated preseptal soft tissue cellulitis and 1 2 cm abscess  · Clinically stable  · No evidence of sepsis  · S/p IV Vanco & clindamycin in ED - will continue that until ID eval  · Ordered blood cultures  · Wound cultures sent at [address]  · Ophthal consulted - d/w him, he will perform his exam but rec ENT input  · Marbin Andre d/w ENT as well - consulted them, d/w them - they are on their way  · Will order steroids & afrin per ENT recs  · ID consult  · Keep NPO, slow IVF through today only  · D/w daughter in law [facility]) - ok for drainage procedure if needed

## 2019-11-03 NOTE — EMTALA/ACUTE CARE TRANSFER
[address] [address]  Dept: [phone]      EMTALA TRANSFER CONSENT    NAME Mireya Bangura                                         1925                              MRN [id number]    I have been informed of my rights regarding examination, treatment, and transfer   by Dr Priscilla Alfaro DO    Benefits: Specialized equipment and/or services available at the receiving facility (Include comment)________________________    Risks: Potential for delay in receiving treatment, Loss of IV, Increased discomfort during transfer, Possible worsening of condition or death during transfer      Consent for Transfer:  I acknowledge that my medical condition has been evaluated and explained to me by the emergency department physician or other qualified medical person and/or my attending physician, who has recommended that I be transferred to the service of  Accepting Physician: Dr Angelika Mota at [address] [place] [address] Name, [address] : [address]  The above potential benefits of such transfer, the potential risks associated with such transfer, and the probable risks of not being transferred have been explained to me, and I fully understand them  The doctor has explained that, in my case, the benefits of transfer outweigh the risks  I agree to be transferred  I authorize the performance of emergency medical procedures and treatments upon me in both transit and upon arrival at the receiving facility  Additionally, I authorize the release of any and all medical records to the receiving facility and request they be transported with me, if possible  I understand that the safest mode of transportation during a medical emergency is an ambulance and that the Hospital advocates the use of this mode of transport   Risks of traveling to the receiving facility by car, including absence of medical control, life sustaining equipment, such as oxygen, and medical personnel has been explained to me and I fully understand them  (MICHELLE CORRECT BOX BELOW)  [  ]  I consent to the stated transfer and to be transported by ambulance/helicopter  [  ]  I consent to the stated transfer, but refuse transportation by ambulance and accept full responsibility for my transportation by car  I understand the risks of non-ambulance transfers and I exonerate the Hospital and its staff from any deterioration in my condition that results from this refusal     X___________________________________________    DATE  19  TIME________  Signature of patient or legally responsible individual signing on patient behalf           RELATIONSHIP TO PATIENT_________________________          Provider Certification    NAME Fransisco Barnhart                                        [facility] 1925                              MRN [id number]    A medical screening exam was performed on the above named patient  Based on the examination:    Condition Necessitating Transfer The primary encounter diagnosis was Periorbital cellulitis of right eye  Diagnoses of Facial abscess and Lymphadenopathy were also pertinent to this visit  Patient Condition:  An emergency transfer is being made prior to stabilization due to the need for definitive care and the benefit of transfer outweighs the risk    Reason for Transfer: Level of Care needed not available at this facility    Transfer Requirements: [address] [place] [address]   · Space available and qualified personnel available for treatment as acknowledged by    · Agreed to accept transfer and to provide appropriate medical treatment as acknowledged by       Dr Sofia Vences  · Appropriate medical records of the examination and treatment of the patient are provided at the time of transfer   [address], [address] _______  · Transfer will be performed by qualified personnel from    and appropriate transfer equipment as required, including the use of necessary and appropriate life support measures  Provider Certification: I have examined the patient and explained the following risks and benefits of being transferred/refusing transfer to the patient/family:  Unanticipated needs of medical equipment and personnel during transport, Risk of worsening condition, The possibility of a transport vehicle being unavailable      Based on these reasonable risks and benefits to the patient and/or the unborn child(lina), and based upon the information available at the time of the patients examination, I certify that the medical benefits reasonably to be expected from the provision of appropriate medical treatments at another medical facility outweigh the increasing risks, if any, to the individuals medical condition, and in the case of labor to the unborn child, from effecting the transfer      X____________________________________________ DATE 11/03/19        TIME_______      ORIGINAL - SEND TO MEDICAL RECORDS   COPY - SEND WITH PATIENT DURING TRANSFER

## 2019-11-03 NOTE — EMTALA/ACUTE CARE TRANSFER
[address] [address]  Dept: [phone]      EMTALA TRANSFER CONSENT    NAME Naomie Petty                                         1925                              MRN [id number]    I have been informed of my rights regarding examination, treatment, and transfer   by Dr Lexi Robertson DO    Benefits:      Risks:        Consent for Transfer:  I acknowledge that my medical condition has been evaluated and explained to me by the emergency department physician or other qualified medical person and/or my attending physician, who has recommended that I be transferred to the service of    at    The above potential benefits of such transfer, the potential risks associated with such transfer, and the probable risks of not being transferred have been explained to me, and I fully understand them  The doctor has explained that, in my case, the benefits of transfer outweigh the risks  I agree to be transferred  I authorize the performance of emergency medical procedures and treatments upon me in both transit and upon arrival at the receiving facility  Additionally, I authorize the release of any and all medical records to the receiving facility and request they be transported with me, if possible  I understand that the safest mode of transportation during a medical emergency is an ambulance and that the Hospital advocates the use of this mode of transport  Risks of traveling to the receiving facility by car, including absence of medical control, life sustaining equipment, such as oxygen, and medical personnel has been explained to me and I fully understand them  (MICHELLE CORRECT BOX BELOW)  [  ]  I consent to the stated transfer and to be transported by ambulance/helicopter  [  ]  I consent to the stated transfer, but refuse transportation by ambulance and accept full responsibility for my transportation by car    I understand the risks of non-ambulance transfers and I exonerate the Hospital and its staff from any deterioration in my condition that results from this refusal     X___________________________________________    DATE  19  TIME________  Signature of patient or legally responsible individual signing on patient behalf           RELATIONSHIP TO PATIENT_________________________          Provider Certification    NAME Dyan Dale                                         1925                              MRN [id number]    A medical screening exam was performed on the above named patient  Based on the examination:    Condition Necessitating Transfer The primary encounter diagnosis was Periorbital cellulitis of right eye  Diagnoses of Facial abscess and Lymphadenopathy were also pertinent to this visit  Patient Condition:      Reason for Transfer:      Transfer Requirements: Facility     · Space available and qualified personnel available for treatment as acknowledged by    · Agreed to accept transfer and to provide appropriate medical treatment as acknowledged by          · Appropriate medical records of the examination and treatment of the patient are provided at the time of transfer   [facility] [address] _______  · Transfer will be performed by qualified personnel from    and appropriate transfer equipment as required, including the use of necessary and appropriate life support measures      Provider Certification: I have examined the patient and explained the following risks and benefits of being transferred/refusing transfer to the patient/family:         Based on these reasonable risks and benefits to the patient and/or the unborn child(lina), and based upon the information available at the time of the patients examination, I certify that the medical benefits reasonably to be expected from the provision of appropriate medical treatments at another medical facility outweigh the increasing risks, if any, to the individuals medical condition, and in the case of labor to the unborn child, from effecting the transfer      X____________________________________________ DATE 11/03/19        TIME_______      ORIGINAL - SEND TO MEDICAL RECORDS   COPY - SEND WITH PATIENT DURING TRANSFER

## 2019-11-03 NOTE — ED NOTES
Daughter in law came to nurses station, "Can someone tell her she can't get out of bed?" Patient asked why she was trying to get out of bed, and she stated " I need to go to the bathroom " Advised to stay in bed and placed on a bedpan with 2 assists  Patient able to follow commands and now resting in bed  Son and daughter in law went to diner and will be back        [address] [facility], [address] [place] [address]  11/03/19 2343

## 2019-11-03 NOTE — H&P
H&P- Beauty Bienenstock 12/1/1925, 80 y o  female MRN: [id number]    Unit/Bed#: ED 08 Encounter: [id number]    Primary Care Provider: Sunita Richard MD   Date and time admitted to hospital: 11/3/2019 10:05 AM        * Abscess of periorbital region, right  Assessment & Plan  · CT facial bones:  Evidence of right dacrocystitis  with associated preseptal soft tissue cellulitis and 1 2 cm abscess  · Clinically stable  · No evidence of sepsis  · S/p IV Vanco & clindamycin in ED - will continue that until ID eval  H/o MRSA bacteremia  Check MRSA nares  · Ordered blood cultures  · Wound cultures sent at [address]  · Ophthal consulted - d/w him, he will perform his exam but rec ENT input  · [address] d/w ENT as well - consulted them, d/w them - they are on their way  · Will order steroids & afrin per ENT recs - one time decadron 10 mg IV, afrin  · ID consult  · Keep NPO, slow IVF through today only  · D/w daughter in law [facility]) - ok for drainage procedure if needed    Dementia [facility])  Assessment & Plan  · Patient with significant baseline dementia according being to the daughter-in-law hold the POA    Patient is confused for more time then being oriented  · Is with son and daughter-in-law daughter-in-law is the primary caregiver  · Continue home Cymbalta 60 mg HS and Remeron 15 mg HS  · Monitor for delirium and confusion  · If needed may have to consider one-to-one observer    Lesion of lung  Assessment & Plan  · H/o LLL lesion, couldn't be biopsed  · Has disappeared with time per daughter in law    MRSA exposure  Assessment & Plan  · H/o MRSA infection in past, sounds like bacteremia of unclear source  · needing 6 week IV Vanco  · They couldn't do MRI of spine  · They suspected it was in spine    A-fib [facility])  Assessment & Plan  · Currently controlled on current regimen  · Cont metoprolol tartarate 50 mg HS  · Not been on [facility]    Chronic congestive heart failure [facility])  Assessment & Plan  Wt Readings from Last 3 Encounters:   11/03/19 85 3 kg (188 lb 0 8 oz)   11/03/19 85 3 kg (188 lb)   11/03/19 85 3 kg (188 lb 0 8 oz)       · H/o volume overload in past  · No echo available to determine systolic or diastolic  · On lasix 40 BID at home with PO potassium  · hold them both for now  · Has doctors primarily at [facility]      Hypertension  Assessment & Plan  · Cont home metoprolol tartarate 50 mg HS  · Lasix on hold as NPO & infection - restart soon            History and Physical - [facility] Internal Medicine    Patient Information: Shelia Eckert 80 y o  female MRN: [id number]  Unit/Bed#: ED 08 Encounter: [id number]  Admitting Physician: Dimas Villanueva MD  PCP: Tana Gary MD  Date of Admission:  11/03/19    Assessment/Plan:    Hospital Problem List:     Principal Problem:    Abscess of periorbital region, right  Active Problems:    Hypertension    Kyphosis    Gout    Chronic congestive heart failure (HCC)    A-fib ([facility] )    MRSA exposure    Lesion of lung    Dementia ([facility] )        VTE Prophylaxis: Enoxaparin (Lovenox)  / sequential compression device   Code Status: DNR  POLST: There is no POLST form on file for this patient (pre-hospital)    Anticipated Length of Stay:  Patient will be admitted on an Inpatient basis with an anticipated length of stay of  > 2 midnights  Justification for Hospital Stay:  Preorbital abscess    Total Time for Visit, including Counseling / Coordination of Care: 45 minutes  Greater than 50% of this total time spent on direct patient counseling and coordination of care  Chief Complaint:   Transferred from [address] [place] [address] ED where she presented with worsening right eye swelling    History of Present Illness:    Shelia Eckert is a 80 y o  female who presents from [facility] emergency room where she presented early this morning due to worsening right eye swelling    Patient has severe significant baseline dementia, lives with son and daughter-in-law, daughter-in-law is the POA and primary caregiver  Patient unable to provide any history with dementia and is hard of hearing  All the information obtained from daughter-in-law, she states that home in August patient had a bout of conjunctivitis which was treated with eyedrops and had resolved with some mild remaining erythema around the eyes since then  Subsequently she had another episode last week for which she was again prescribed antibiotics drops by PCP on  But they did not seem to be working hence they contacted the PCP who prescribed Augmentin for her but pharmacy switched to Augmentin to amoxicillin  She received 1 dose of amoxicillin last night  PCP advised to give it 24-48 hours to work, but overnight the daughter-in-law noticed that the does not improving her right eye was looking worse with more redness and pus-like discharge from her right eye hence she called the on-call line for the PCP and they recommended her to go to the emergency room here at [facility] Emergency Room CT scan of her facial bones 1st performed in the suspicion of decrease cystitis and pre orbital abscess hence the discuss the case with Ophthalmology and ENT, ENT recommended ophthalmology evaluation to evaluate her EOM & vision for which the patient was transferred here to [facility]  She received 1 dose of IV vancomycin and clindamycin at the ED  According to the daughter patient has history of hypertension, also has history of atrial fibrillation, on metoprolol which had dropped her heart rate and blood pressures in the past but currently she has been stable on the current regimen  Not on any anticoagulation  She has history of developing pedal edema for which she is on Lasix at home  Also she has anxiety that is why she is on Cymbalta and Remeron    Redness in Aug - rxed with drops, resolved but some redness always   Happened again last week, given drops - no effect, given augmentin by PCP, replaced by pharmacy to amoxicillin, got one dose, but didn't improve rather worsened hence brought in  Lives with son & daughter-in-law    Review of Systems:    Review of Systems   Unable to perform ROS: Dementia       Past Medical and Surgical History:     Past Medical History:   Diagnosis Date    Alzheimer disease ([facility] )     Anxiety     Hypertension        History reviewed  No pertinent surgical history  Meds/Allergies:    Prior to Admission medications    Medication Sig Start Date End Date Taking? Authorizing Provider   acetaminophen (TYLENOL) 650 mg CR tablet Take 1 tablet (650 mg total) by mouth every 8 (eight) hours as needed for mild pain 9/24/19   Dejuan Rushing DO   amoxicillin-clavulanate (AUGMENTIN) 875-125 mg per tablet Take 1 tablet by mouth every 12 (twelve) hours    Historical Provider, MD   DULoxetine HCl (CYMBALTA PO) Take 60 mg by mouth daily at bedtime    Historical Provider, MD   furosemide (LASIX) 40 mg tablet Take 40 mg by mouth 2 (two) times a day    Historical Provider, MD   METOPROLOL TARTRATE PO Take 50 mg by mouth daily at bedtime    Historical Provider, MD   MIRTAZAPINE PO Take 7 5 mg by mouth every evening     Historical Provider, MD   potassium chloride (MICRO-K) 10 MEQ CR capsule Take 20 mEq by mouth daily    Historical Provider, MD KRUSE have reviewed home medications with patient family member  Allergies: Allergies   Allergen Reactions    Aspirin     Caffeine     Ciprofloxacin     Crab (Diagnostic)     Shellfish-Derived Products     Strawberry C [Ascorbate]     Tomato        Social History:     Marital Status:     Occupation:   Patient Pre-hospital Living Situation:  Lives with son and daughter-in-law  Patient Pre-hospital Level of Mobility:  Active  Patient Pre-hospital Diet Restrictions:  Low-salt  Substance Use History:   Social History     Substance and Sexual Activity   Alcohol Use Never    Frequency: Never     Social History     Tobacco Use   Smoking Status Never Smoker   Smokeless Tobacco Never Used     Social History     Substance and Sexual Activity   Drug Use Never       Family History:    non-contributory    Physical Exam:     Vitals:   Blood Pressure: 130/58 (11/03/19 1014)  Pulse: 66 (11/03/19 1014)  Temperature: 98 4 °F (36 9 °C) (11/03/19 1014)  Temp Source: Oral (11/03/19 1014)  Respirations: 20 (11/03/19 1014)  Height: 5' 5" (165 1 cm) (11/03/19 1014)  Weight - Scale: 85 3 kg (188 lb 0 8 oz) (11/03/19 1014)  SpO2: 92 % (11/03/19 1015)    Physical Exam   Constitutional: She appears well-developed and well-nourished  HENT:   Head: Normocephalic and atraumatic  Eyes: Conjunctivae are normal  Right eye exhibits discharge  Left eye exhibits no discharge  Significant erythema involving right periorbital region as well as extending to the right maxillary skin area  Significant pus-like discharge from her right eye, with difficulty opening her right eye   Cardiovascular: Normal rate and regular rhythm  Exam reveals no friction rub  No murmur heard  Pulmonary/Chest: Effort normal and breath sounds normal  No stridor  No respiratory distress  Abdominal: Soft  She exhibits no distension  There is no tenderness  Musculoskeletal: She exhibits no tenderness  Neurological: She is alert  Demented not oriented   Vitals reviewed  Additional Data:     Lab Results: I have personally reviewed pertinent reports  Results from last 7 days   Lab Units 11/03/19  0203   WBC Thousand/uL 9 10   HEMOGLOBIN g/dL 12 9   HEMATOCRIT % 40 7   PLATELETS Thousands/uL 215   NEUTROS PCT % 58   LYMPHS PCT % 34   MONOS PCT % 6   EOS PCT % 2     Results from last 7 days   Lab Units 11/03/19  0203   POTASSIUM mmol/L 4 5   CHLORIDE mmol/L 99*   CO2 mmol/L 30   BUN mg/dL 20   CREATININE mg/dL 1 08   CALCIUM mg/dL 8 8   ALK PHOS U/L 122*   ALT U/L 14   AST U/L 12     Results from last 7 days   Lab Units 11/03/19  0203   INR  0 99       Imaging: I have personally reviewed pertinent reports        Ct Facial Bones Without Contrast    Result Date: 11/3/2019  Narrative: CT FACIAL BONES WITHOUT INTRAVENOUS CONTRAST INDICATION:   right periorbital cellulitis  Pt has purulent drainage from her right eye  swelling and gross redness around the right eye extending over the bridge of the nose and spreading to the left side of the left face  As per family, redness for a  week and drainage/swelling began a few hours ago  Pt in ER with EMS with c/o facial cellulitis  Per pt's family, pt was diagnosed with conjunctivitis, treated with Polytrim eyedrops with resolution  Pt with recurrent symptoms a few days ago, restarted on Polytrim  Family called PCP and informed them of worsening symptoms, and pt was started on Augmentin  Pt now with abscess to medial portion of right eye with copious amounts of purulent drainage  Pt also with facial cellulitis extending from supraorbital region to maxilla  Family deny fevers/chills  Pt is a poor historian due to a hx of Alzheimer's dementia  COMPARISON: None available  TECHNIQUE:  Axial CT images were obtained through the facial bones with additional sagittal and coronal reconstructions  Radiation dose length product (DLP) for this visit:  772 53 mGy-cm   This examination, like all CT scans performed in the [facility], was performed utilizing techniques to minimize radiation dose exposure, including the use of iterative  reconstruction and automated exposure control  IMAGE QUALITY:  Diagnostic  FINDINGS: FACIAL BONES:  There is no evidence of acute facial bone fracture  Normal alignment of the temporomandibular joints  ORBITS AND SINUSES:  There is skin thickening and soft tissue swelling overlying the right orbit and extending to the right forehead and right cheek, as well as the nose  There is abnormal soft tissue density medial to the right globe, located between the right globe and the right side of the nose, which measures approximately 2 4 x 3 x 2 5 cm    Although evaluation is limited without intravenous contrast, the appearance is suspicious for abscess  This is extraconal in location and is likely preseptal, however, extension posterior to the septum cannot be entirely excluded without intravenous contrast   This deviates the right globe laterally slightly  The extraocular muscles appear symmetric bilaterally, as do the optic nerves  The intraconal fat appears unremarkable  There is partial opacification of the ethmoids, most pronounced anteriorly on the right  There is absence of a portion of the lateral cortex of one of the right anterior ethmoid air cells suggesting that ethmoid sinus disease may be the cause of the infection; this is best demonstrated on axial series 9 image 93-96  The path of the right nasolacrimal duct appears mildly expanded  Lymphoma should be excluded  There is mild to moderate mucosal thickening in the right maxillary sinus and there is moderate to marked mucosal thickening in the left maxillary sinus  There is some hyperdense material within the left maxillary sinus suggesting inspissated secretions  Fungal infection should be excluded clinically  OTHER: There is supraclavicular lymphadenopathy bilaterally, right greater than left  There is also some lymphadenopathy within the mid to lower neck  There are a few small mildly hyperdense nodules within the right parotid gland  A nodule in the inferior right parotid on series 10 image 27 measures 8 x 4 mm  There is a 7 x 4 mm superficial nodule in the right parotid on series 10 image 36-37  There appears to be some asymmetric soft tissue fullness in the right pharyngeal mucosal space which appears to cause some mass effect on the right piriform sinus  Although this could be due to retained secretions, a lesion in this area should be excluded  ENT consultation and follow-up is recommended  Evaluation of the upper to mid neck is limited by dental artifact       Impression: There is right periorbital cellulitis  There is abnormal soft tissue density material medial to the right globe which measures approximately 2 4 x 3 x 2 5 cm  This is extraconal in location and is suspicious for abscess  Neoplasm, such as lymphoma, should be excluded clinically  Please see above discussion  There is bilateral supraclavicular lymphadenopathy, right greater than left  There is also some lymphadenopathy within the mid to lower neck  Evaluation of the upper to mid neck is limited by dental artifact  Small right parotid nodes  There appears to be some asymmetric soft tissue fullness in the right pharyngeal mucosal space which appears to cause some mass effect on the right piriform sinus  Evaluation of this area is limited due to lack of intravenous contrast and dental artifact  Although this could be due to retained secretions, a mass lesion in this area should be excluded  ENT consultation and follow-up is recommended  I personally discussed this study with Anish Arreola on 11/3/2019 at 5:48 AM  Workstation performed: [id number]     Ct Facial Bones With Contrast    Result Date: 11/3/2019  Narrative: CT FACIAL SOFT TISSUES WITH INTRAVENOUS CONTRAST INDICATION:   facial abscess/cellulitis  COMPARISON: Same date facial CT TECHNIQUE:  Axial images through the facial soft tissues were performed  Reformatted images were created in multiple planes  Radiation dose length product (DLP) for this visit:  398 36 mGy-cm   This examination, like all CT scans performed in the [facility], was performed utilizing techniques to minimize radiation dose exposure, including the use of iterative  reconstruction and automated exposure control  IV Contrast:  85 mL of iodixanol (VISIPAQUE) IMAGE QUALITY:  Diagnostic  FINDINGS: FACIAL BONES: There is no facial bone fracture identified  No discrete lytic or blastic lesion  No destructive lesions   ORBITS: There is inflamed right lacrimal cyst and nasolacrimal duct with associated preseptal fluid collection measuring approximately 1 1 x 1 2 cm (series 2 image 57)  There is adjacent soft tissue swelling  There is no appreciable post septal extension of inflammatory changes  Left orbit is unremarkable  SOFT TISSUES: Right preseptal soft tissue swelling of fluid collection as described above  SINUSES: There is moderate to severe left and mild right axilla sinuses mucosal thickening  Mild inflammatory mucosal thickening of anterior ethmoidal cells  Evaluation of the oral cavity is limited by streak artifact from a total work  Partially imaged right greater than left supraclavicular lymphadenopathy     Impression: 1  Evidence of right dacrocystitis  with associated preseptal soft tissue cellulitis and 1 2 cm abscess  No post septal extension of inflammatory changes  2   Left greater than right maxillary sinuses disease  3   Partially imaged right greater than left supraclavicular lymphadenopathy  The study was marked in [facility] for immediate notification  Workstation performed: [id number]       EKG, Pathology, and Other Studies Reviewed on Admission:   · EKG:  Ordered, pending    Epic / Care Everywhere Records Reviewed: Yes     ** Please Note: This note has been constructed using a voice recognition system   **

## 2019-11-03 NOTE — CONSULTS
This 80-year-old woman was transferred here from [facility] earlier today  She has a history of swelling and redness around the right medial canthal area  She has a history of dementia  On examination, the patient is resting comfortably but has apparent tenderness around the right eye  I am unable to assess visual acuity due to dementia  Extraocular movements are full  The right medial canthal area and upper and lower lids and cheek are erythematous and mildly edematous  There is a 1 x 2 cm mass noted in the medial canthal area consistent with an abscess in the nasolacrimal sac  There is yellow mucus discharge noted on the surface of the eye and lashes  There is 1+ injection and chemosis noted  The cornea is clear  The anterior chamber is formed  There is a well-centered posterior chamber intra-ocular lens  There is a bright red reflex and with indirect ophthalmoscopy I can appreciate an unremarkable posterior pole  The intra-ocular pressure is 16  Impression:  Right preseptal cellulitis with abscess  Case discussed with ENT  I agree with surgical drainage and IV antibiotics  Plan: Will treat empirically with antibiotic steroids topically and monitor

## 2019-11-03 NOTE — ED NOTES
Pt awaked when Son and daughter in law came back in  Knew who they were  Pt had a hard time opening her right eye due to pus accumulation  Cleaned with warm compress, able to open her eye   Moved up in bed with 2 assist, pt states, " that feels better "     Zuri Greer, RN  11/03/19 9483

## 2019-11-03 NOTE — PLAN OF CARE
Problem: Prexisting or High Potential for Compromised Skin Integrity  Goal: Skin integrity is maintained or improved  Description  INTERVENTIONS:  - Identify patients at risk for skin breakdown  - Assess and monitor skin integrity  - Assess and monitor nutrition and hydration status  - Monitor labs   - Assess for incontinence   - Turn and reposition patient  - Assist with mobility/ambulation  - Relieve pressure over bony prominences  - Avoid friction and shearing  - Provide appropriate hygiene as needed including keeping skin clean and dry  - Evaluate need for skin moisturizer/barrier cream  - Collaborate with interdisciplinary team   - Patient/family teaching  - Consider wound care consult   Outcome: Progressing     Problem: Potential for Falls  Goal: Patient will remain free of falls  Description  INTERVENTIONS:  - Assess patient frequently for physical needs  -  Identify cognitive and physical deficits and behaviors that affect risk of falls    -  [place] fall precautions as indicated by assessment   - Educate patient/family on patient safety including physical limitations  - Instruct patient to call for assistance with activity based on assessment  - Modify environment to reduce risk of injury  - Consider OT/PT consult to assist with strengthening/mobility  Outcome: Progressing     Problem: PAIN - ADULT  Goal: Verbalizes/displays adequate comfort level or baseline comfort level  Description  Interventions:  - Encourage patient to monitor pain and request assistance  - Assess pain using appropriate pain scale  - Administer analgesics based on type and severity of pain and evaluate response  - Implement non-pharmacological measures as appropriate and evaluate response  - Consider cultural and social influences on pain and pain management  - Notify physician/advanced practitioner if interventions unsuccessful or patient reports new pain  Outcome: Progressing     Problem: INFECTION - ADULT  Goal: Absence or prevention of progression during hospitalization  Description  INTERVENTIONS:  - Assess and monitor for signs and symptoms of infection  - Monitor lab/diagnostic results  - Monitor all insertion sites, i e  indwelling lines, tubes, and drains  - Monitor endotracheal if appropriate and nasal secretions for changes in amount and color  - [place] appropriate cooling/warming therapies per order  - Administer medications as ordered  - Instruct and encourage patient and family to use good hand hygiene technique  - Identify and instruct in appropriate isolation precautions for identified infection/condition  Outcome: Progressing     Problem: SAFETY ADULT  Goal: Maintain or return to baseline ADL function  Description  INTERVENTIONS:  -  Assess patient's ability to carry out ADLs; assess patient's baseline for ADL function and identify physical deficits which impact ability to perform ADLs (bathing, care of mouth/teeth, toileting, grooming, dressing, etc )  - Assess/evaluate cause of self-care deficits   - Assess range of motion  - Assess patient's mobility; develop plan if impaired  - Assess patient's need for assistive devices and provide as appropriate  - Encourage maximum independence but intervene and supervise when necessary  - Involve family in performance of ADLs  - Assess for home care needs following discharge   - Consider OT consult to assist with ADL evaluation and planning for discharge  - Provide patient education as appropriate  Outcome: Progressing  Goal: Maintain or return mobility status to optimal level  Description  INTERVENTIONS:  - Assess patient's baseline mobility status (ambulation, transfers, stairs, etc )    - Identify cognitive and physical deficits and behaviors that affect mobility  - Identify mobility aids required to assist with transfers and/or ambulation (gait belt, sit-to-stand, lift, walker, cane, etc )  - [place] fall precautions as indicated by assessment  - Record patient progress and toleration of activity level on Mobility SBAR; progress patient to next Phase/Stage  - Instruct patient to call for assistance with activity based on assessment  - Consider rehabilitation consult to assist with strengthening/weightbearing, etc   Outcome: Progressing     Problem: DISCHARGE PLANNING  Goal: Discharge to home or other facility with appropriate resources  Description  INTERVENTIONS:  - Identify barriers to discharge w/patient and caregiver  - Arrange for needed discharge resources and transportation as appropriate  - Identify discharge learning needs (meds, wound care, etc )  - Arrange for interpretive services to assist at discharge as needed  - Refer to Case Management Department for coordinating discharge planning if the patient needs post-hospital services based on physician/advanced practitioner order or complex needs related to functional status, cognitive ability, or social support system  Outcome: Progressing     Problem: Knowledge Deficit  Goal: Patient/family/caregiver demonstrates understanding of disease process, treatment plan, medications, and discharge instructions  Description  Complete learning assessment and assess knowledge base    Interventions:  - Provide teaching at level of understanding  - Provide teaching via preferred learning methods  Outcome: Progressing     Problem: SKIN/TISSUE INTEGRITY - ADULT  Goal: Skin integrity remains intact  Description  INTERVENTIONS  - Identify patients at risk for skin breakdown  - Assess and monitor skin integrity  - Assess and monitor nutrition and hydration status  - Monitor labs (i e  albumin)  - Assess for incontinence   - Turn and reposition patient  - Assist with mobility/ambulation  - Relieve pressure over bony prominences  - Avoid friction and shearing  - Provide appropriate hygiene as needed including keeping skin clean and dry  - Evaluate need for skin moisturizer/barrier cream  - Collaborate with interdisciplinary team (i e  Nutrition, Rehabilitation, etc )   - Patient/family teaching  Outcome: Progressing  Goal: Incision(s), wounds(s) or drain site(s) healing without S/S of infection  Description  INTERVENTIONS  - Assess and document risk factors for skin impairment   - Assess and document dressing, incision, wound bed, drain sites and surrounding tissue  - Consider nutrition services referral as needed  - Oral mucous membranes remain intact  - Provide patient/ family education  Outcome: Progressing

## 2019-11-03 NOTE — PROGRESS NOTES
Vancomycin IV Pharmacy-to-Dose Consultation    Karey Bueno is a 80 y o  female who is currently receiving Vancomycin IV with management by the Pharmacy Consult service  Relevant clinical data and objective history reviewed:  Creatinine   Date Value Ref Range Status   11/03/2019 1 08 0 60 - 1 30 mg/dL Final     Comment:     Standardized to IDMS reference method     /80   Pulse 71   Temp 98 4 °F (36 9 °C)   Resp 17   Ht 5' 5" (1 651 m)   Wt 85 3 kg (188 lb 0 8 oz)   LMP  (LMP Unknown)   SpO2 95%   BMI 31 29 kg/m²   No intake/output data recorded  Lab Results   Component Value Date/Time    BUN 20 11/03/2019 02:03 AM    WBC 9 10 11/03/2019 02:03 AM    HGB 12 9 11/03/2019 02:03 AM    HCT 40 7 11/03/2019 02:03 AM     (H) 11/03/2019 02:03 AM     11/03/2019 02:03 AM     Temp Readings from Last 3 Encounters:   11/03/19 98 4 °F (36 9 °C)   11/03/19 97 7 °F (36 5 °C) (Tympanic)   09/24/19 (!) 97 1 °F (36 2 °C) (Tympanic)     Vancomycin Assessment:  Indication: periorbital abscess  Status: stable  Cultures:  11/3 wound culture: in process  11/3 blood cultures x 2: in process  11/3 MRSA nasal culture: pending collection  Procalcitonin:   None   Renal Function: Unclear of patient's baseline SCr; SCr this AM on arrival to ED is 1 08  Potential Nephrotoxicity Factors:  Medications: IV contrast, home diuretic use (not ordered currently)  Patient Factors: elderly  Days of Therapy: 1  Current Dose: 1,250 mg q24h, first dose given at [place] prior to transfer to [facility]  Goal Trough: 15-20  Last Level: n/a     Vancomycin Plan:  Dosing: Continue with 1,250 mg q24h  Next Level: Will check a steady state level on 11/6 @ 0230 prior to the 4th dose  Renal Function Monitoring: continue to monitor UOP and SCr     Pharmacy will continue to follow closely for s/sx of nephrotoxicity, infusion reactions and appropriateness of therapy  BMP and CBC will be ordered per protocol   We will continue to follow the patients culture results and clinical progress daily      Joshua Worthy, PharmD, [address] [facility] Clinical Pharmacist  [phone] or via [address]

## 2019-11-03 NOTE — PROGRESS NOTES
S/p I&D right periorbital abscess, right anterior ethmoidectomy    Plan:  Continue IV abx including MRSA coverage  Await cultures  Apply bacitracin ophthalmic BID to incision near medial canthus on the right  Artifical tears to right eye every 2 hours while awake  Afrin to nares PRN nosebleed only  Nasal sprays to nares starting tomorrow (11/4)  If O2, use humidified via nasal cannula  Pain control with tylenol, ibuprofen if able, hydrocodone/oxycodone if able  May resume PO diet    Will follow

## 2019-11-03 NOTE — OP NOTE
OPERATIVE REPORT  PATIENT NAME: Dyan Dale    :  1925  MRN: [id number]  Pt Location: [facility] OR ROOM 07    SURGERY DATE: 11/3/2019    Surgeon(s) and Role:     * Zaina Albright MD - Primary    Preop Diagnosis:  Abscess of periorbital region, right [H05 011]  Acute ethmoidal sinusitis, recurrence not specified [J01 20]    Post-Op Diagnosis Codes:     * Abscess of periorbital region, right [H05 011]     * Acute ethmoidal sinusitis, recurrence not specified [J01 20]    Procedure(s) (LRB):  Right anterior ethmoidectomy  Bilateral nasal endoscopy, image guidance  Incision and drainage right periorbital abscess  Right dacryocystorhinostomy    Specimen(s):  ID Type Source Tests Collected by Time Destination   1 : right ethmoid sinus contents Tissue Ethmoid Sinus TISSUE EXAM Zaina Albright MD 11/3/2019 1605    A : right bao orbital abscess Body Fluid Other ANAEROBIC CULTURE AND GRAM STAIN, BODY FLUID CULTURE AND GRAM STAIN Zaina Albright MD 11/3/2019 1532        Estimated Blood Loss:   20 mL    Drains:  * No LDAs found *    Anesthesia Type:   Choice    Operative Indications:  Acute right periorbital cellulitis with abscess within the nasolacrimal sac/duct  CT evidence of right ethmoid opacification and bony dehiscence as possible source of infection  Also possibility of neoplasm such as lymphoma  Operative Findings:  Right preseptal cellulitis with associated abscess including superficial and deeper components drained, irrigated with copious normal saline, cultures taken  Right ethmoidectomy to clear adjacent concerning ethmoid air cells, no purulence encountered endonasal    Complications:   None    Procedure and Technique:  The patient was identified and placed on the operating table in a supine position  General anesthesia was induced and the image guidance headset was applied and calibrated and seen to be accurate  The patient was prepped and draped in sterile fashion   Timeout was performed to confirm patient's name, ID, procedure  A small incision was made through the skin overlying the right nasolacrimal sac, purulent drainage was encountered  The sac was probed with curved hemostat and deeper pocket of purulent drainage was encountered  Cultures were sent  The area was irrigated with copious normal saline using 16 g angiocatheter  Right dacryocystorhinostomy was performed using nasolacrimal probes from 00 to size 2 through the right nasolacrimal punctum and duct  Attention was then turned to the nasal endoscopy and right anterior ethmoidectomy  Nasal cavities were decongested with oxymetazoline soaked pledgets  Injection of the right middle turbinate and uncinate performed with 1% lidocaine w 1:100,000 epi  The 30 degree nasal endoscope was used to obtain views  The left nasal passage was evaluated and appeared unremarkable  The right middle turbinate was gently medialized with freer  Suction was used with image guidance to palpate ethmoid cells that were opacified on CT  Using audrey suction, microdebrider, through cutting forceps, and J curette, the ethmoid bulla and adjacent ethmoid cells were removed including those right against the lamina papyracea  This was continued up to the skull base and to the fronto-ethmoid recess  Tissue and mucosa were sent to pathology from this location  Image guidance was used for confirmation  Once the ethmoid cells were cleared  The area was irrigated with normal saline and oxymetazoline pledgets placed for hemostasis  The nasopharynx and oropharynx were suctioned also  The patient was then awakened from general anesthesia, extubated, and transported to the PACU in stable condition  The patient tolerated this well     I was present for the entire procedure    Patient Disposition:  PACU     SIGNATURE: Brenda Richard MD  DATE: November 3, 2019  TIME: 4:44 PM

## 2019-11-03 NOTE — ASSESSMENT & PLAN NOTE
· H/o MRSA infection in past  · needing 6 week IV Vanco  · They couldn't do MRI of spine  · They suspected it was in spine

## 2019-11-03 NOTE — ANESTHESIA POSTPROCEDURE EVALUATION
Post-Op Assessment Note    CV Status:  Stable       Mental Status:  Lethargic and arousable   Hydration Status:  Stable   PONV Controlled:  None   Airway Patency:  Patent   Post Op Vitals Reviewed: Yes      Staff: Anesthesiologist, CRNA           BP   124/61   Temp   97 5   Pulse  71   Resp   18   SpO2   93

## 2019-11-03 NOTE — ED NOTES
Patient trying to get out of bed, "trying to go see the blue birds " reoriented, but still trying to get out and asked, "the blue birds in the hospital?" reoriented again and pt resting in bed       [address]  11/03/19 4513

## 2019-11-03 NOTE — ASSESSMENT & PLAN NOTE
Wt Readings from Last 3 Encounters:   11/03/19 85 3 kg (188 lb 0 8 oz)   11/03/19 85 3 kg (188 lb)   11/03/19 85 3 kg (188 lb 0 8 oz)       · H/o volume overload in past  · No echo available to determine systolic or diastolic  · On lasix 40 BID at home with PO potassium  · hold them both for now  · Has doctors primarily at [facility]

## 2019-11-03 NOTE — ED PROVIDER NOTES
History  Chief Complaint   Patient presents with    Cellulitis     Pt has purulent drainage from her right eye  swelling and gross redness around the right eye extending over the bridge of the nose and spreading to the left side of the left face  As per family, redness for a week and drainage/swelling began a few hours ago  Pt in ER with EMS with c/o facial cellulitis  Per pt's family, pt was diagnosed with conjunctivitis, treated with Polytrim eyedrops with resolution  Pt with recurrent symptoms a few days ago, restarted on Polytrim  Family called PCP and informed them of worsening symptoms, and pt was started on Augmentin  Pt now with abscess to medial portion of right eye with copious amounts of purulent drainage  Pt also with facial cellulitis extending from supraorbital region to maxilla  Family deny fevers/chills  Pt is a poor historian due to a hx of Alzheimer's dementia  History provided by:  EMS personnel and relative  History limited by:  Dementia   used: No        Prior to Admission Medications   Prescriptions Last Dose Informant Patient Reported? Taking?    DULoxetine HCl (CYMBALTA PO) 11/2/2019 at Unknown time  Yes Yes   Sig: Take 60 mg by mouth daily at bedtime   METOPROLOL TARTRATE PO 11/2/2019 at Unknown time  Yes Yes   Sig: Take 50 mg by mouth daily at bedtime   MIRTAZAPINE PO   Yes No   Sig: Take 7 5 mg by mouth every evening    acetaminophen (TYLENOL) 650 mg CR tablet Past Week at Unknown time  No Yes   Sig: Take 1 tablet (650 mg total) by mouth every 8 (eight) hours as needed for mild pain   amoxicillin-clavulanate (AUGMENTIN) 875-125 mg per tablet 11/2/2019 at Unknown time Family Member Yes Yes   Sig: Take 1 tablet by mouth every 12 (twelve) hours   furosemide (LASIX) 40 mg tablet 11/2/2019 at Unknown time  Yes Yes   Sig: Take 40 mg by mouth 2 (two) times a day   potassium chloride (MICRO-K) 10 MEQ CR capsule 11/2/2019 at Unknown time  Yes Yes   Sig: Take 20 mEq by mouth daily      Facility-Administered Medications: None       Past Medical History:   Diagnosis Date    Alzheimer disease ([address] )     Anxiety     Hypertension        No past surgical history on file  No family history on file  I have reviewed and agree with the history as documented  Social History     Tobacco Use    Smoking status: Never Smoker    Smokeless tobacco: Never Used   Substance Use Topics    Alcohol use: Never     Frequency: Never    Drug use: Never        Review of Systems   Unable to perform ROS: Dementia   Skin: Positive for color change  Physical Exam  Physical Exam   Constitutional: She appears well-developed and well-nourished  She appears distressed  HENT:   Head: Normocephalic  Eyes: Pupils are equal, round, and reactive to light  Conjunctivae are normal  Right conjunctiva is not injected  Right conjunctiva has no hemorrhage  Left conjunctiva is not injected  Left conjunctiva has no hemorrhage  Right eye exhibits normal extraocular motion  Left eye exhibits normal extraocular motion  Right pupil is reactive  Left pupil is reactive  Neck: Normal range of motion  Neck supple  Cardiovascular: Normal rate and regular rhythm  Pulmonary/Chest: Effort normal and breath sounds normal    Musculoskeletal: She exhibits no edema, tenderness or deformity  Neurological: She is alert     Skin:   Cellulitis as previously described       Vital Signs  ED Triage Vitals [11/03/19 0142]   Temperature Pulse Respirations Blood Pressure SpO2   99 °F (37 2 °C) 66 18 135/64 93 %      Temp Source Heart Rate Source Patient Position - Orthostatic VS BP Location FiO2 (%)   Tympanic Monitor Lying Left arm --      Pain Score       2           Vitals:    11/03/19 0530 11/03/19 0700 11/03/19 0848 11/03/19 0915   BP: 152/64 151/67 158/74 122/77   Pulse: 66  78 67   Patient Position - Orthostatic VS: Sitting Lying Lying Lying         Visual Acuity      ED Medications  Medications   vancomycin (VANCOCIN) 1,250 mg in sodium chloride 0 9 % 250 mL IVPB (0 mg/kg × 85 3 kg Intravenous Stopped 11/3/19 0452)   clindamycin (CLEOCIN) IVPB (premix) 600 mg (0 mg Intravenous Stopped 11/3/19 0737)   iodixanol (VISIPAQUE) 320 MG/ML injection 85 mL (85 mL Intravenous Given 11/3/19 0654)       Diagnostic Studies  Results Reviewed     Procedure Component Value Units Date/Time    Wound culture and Gram stain [[id number]] Collected:  11/03/19 0203    Lab Status:  Preliminary result Specimen:  Wound from Nose Updated:  11/03/19 1615     Gram Stain Result No Polys or Bacteria seen    Protime-INR [[id number]]  (Normal) Collected:  11/03/19 0203    Lab Status:  Final result Specimen:  Blood from Arm, Right Updated:  11/03/19 0242     Protime 10 7 seconds      INR 0 99    APTT [[id number]]  (Normal) Collected:  11/03/19 0203    Lab Status:  Final result Specimen:  Blood from Arm, Right Updated:  11/03/19 0242     PTT 28 seconds     Comprehensive metabolic panel [[id number]]  (Abnormal) Collected:  11/03/19 0203    Lab Status:  Final result Specimen:  Blood from Arm, Right Updated:  11/03/19 0235     Sodium 137 mmol/L      Potassium 4 5 mmol/L      Chloride 99 mmol/L      CO2 30 mmol/L      ANION GAP 8 mmol/L      BUN 20 mg/dL      Creatinine 1 08 mg/dL      Glucose 122 mg/dL      Calcium 8 8 mg/dL      AST 12 U/L      ALT 14 U/L      Alkaline Phosphatase 122 U/L      Total Protein 7 7 g/dL      Albumin 3 1 g/dL      Total Bilirubin 0 90 mg/dL      eGFR 44 ml/min/1 73sq m     Narrative:       [place] guidelines for Chronic Kidney Disease (CKD):     Stage 1 with normal or high GFR (GFR > 90 mL/min/1 73 square meters)    Stage 2 Mild CKD (GFR = 60-89 mL/min/1 73 square meters)    Stage 3A Moderate CKD (GFR = 45-59 mL/min/1 73 square meters)    Stage 3B Moderate CKD (GFR = 30-44 mL/min/1 73 square meters)    Stage 4 Severe CKD (GFR = 15-29 mL/min/1 73 square meters)    Stage 5 End Stage CKD (GFR <15 mL/min/1 73 square meters)  Note: GFR calculation is accurate only with a steady state creatinine    CBC and differential [[id number]]  (Abnormal) Collected:  11/03/19 0203    Lab Status:  Final result Specimen:  Blood from Arm, Right Updated:  11/03/19 0223     WBC 9 10 Thousand/uL      RBC 3 85 Million/uL      Hemoglobin 12 9 g/dL      Hematocrit 40 7 %       fL      MCH 33 5 pg      MCHC 31 7 g/dL      RDW 13 3 %      MPV 11 0 fL      Platelets 761 Thousands/uL      nRBC 0 /100 WBCs      Neutrophils Relative 58 %      Immat GRANS % 0 %      Lymphocytes Relative 34 %      Monocytes Relative 6 %      Eosinophils Relative 2 %      Basophils Relative 0 %      Neutrophils Absolute 5 27 Thousands/µL      Immature Grans Absolute 0 04 Thousand/uL      Lymphocytes Absolute 3 05 Thousands/µL      Monocytes Absolute 0 58 Thousand/µL      Eosinophils Absolute 0 14 Thousand/µL      Basophils Absolute 0 02 Thousands/µL                  CT facial bones with contrast   Final Result by Janice Grigsby MD (11/03 1681)      1  Evidence of right dacrocystitis  with associated preseptal soft tissue cellulitis and 1 2 cm abscess  No post septal extension of inflammatory changes  2   Left greater than right maxillary sinuses disease  3   Partially imaged right greater than left supraclavicular lymphadenopathy  The study was marked in [facility] for immediate notification  Workstation performed: [id number]         CT facial bones without contrast   Final Result by Kwesi Porter MD (11/03 0557)      There is right periorbital cellulitis  There is abnormal soft tissue density material medial to the right globe which measures approximately 2 4 x 3 x 2 5 cm  This is extraconal in location and is suspicious for abscess  Neoplasm, such as lymphoma,    should be excluded clinically  Please see above discussion  There is bilateral supraclavicular lymphadenopathy, right greater than left    There is also some lymphadenopathy within the mid to lower neck  Evaluation of the upper to mid neck is limited by dental artifact  Small right parotid nodes  There appears to be some asymmetric soft tissue fullness in the right pharyngeal mucosal space which appears to cause some mass effect on the right piriform sinus  Evaluation of this area is limited due to lack of intravenous contrast and dental    artifact  Although this could be due to retained secretions, a mass lesion in this area should be excluded  ENT consultation and follow-up is recommended  I personally discussed this study with Gaurav Nava on 11/3/2019 at 5:48 AM                         Workstation performed: [id number]                    Procedures  Procedures       ED Course                               MDM  Number of Diagnoses or Management Options  Facial abscess:   Lymphadenopathy:   Periorbital cellulitis of right eye:   Diagnosis management comments: D/w Dr Kamilah Dawn  He reviewed images and would like an Ophthalmology consult before he performs any surgical intervention  He recommends CT with IV contrast  He also recommends additional abx - will add Clindamycin  Plan to [facility] [place] [facility]   D/w Dr Luanne Horne       Amount and/or Complexity of Data Reviewed  Clinical lab tests: ordered and reviewed  Tests in the radiology section of CPT®: ordered and reviewed    Risk of Complications, Morbidity, and/or Mortality  Presenting problems: high  Diagnostic procedures: high  Management options: high    Patient Progress  Patient progress: stable      Disposition  Final diagnoses:   Periorbital cellulitis of right eye   Facial abscess   Lymphadenopathy     Time reflects when diagnosis was documented in both MDM as applicable and the Disposition within this note     Time User Action Codes Description Comment    11/3/2019  7:31 AM Martina Cifuentes Add [A85 410] Periorbital cellulitis of right eye     11/3/2019  7:32 AM Fatuma Mitchell Add [L02 01] Facial abscess     11/3/2019  7:32 AM Fatuma Mitchell Add [R59 1] Lymphadenopathy       ED Disposition     ED Disposition Condition Date/Time Comment    Transfer to Another Facility-In Network  [place] Nov 3, 2019  7:31 AM Kee Kaye should be transferred out to [facility]          MD Documentation      Most Recent Value   Patient Condition  An emergency transfer is being made prior to stabilization due to the need for definitive care and the benefit of transfer outweighs the risk   Reason for Transfer  Level of Care needed not available at this facility   Benefits of Transfer  Specialized equipment and/or services available at the receiving facility (Include comment)________________________   Risks of Transfer  Potential for delay in receiving treatment, Loss of IV, Increased discomfort during transfer, Possible worsening of condition or death during transfer   Accepting Physician  Dr Slade Valero Name, [facility]   Sending MD  Dr Velia Valencia   Provider Certification  Unanticipated needs of medical equipment and personnel during transport, Risk of worsening condition, The possibility of a transport vehicle being unavailable      RN Documentation      Most Recent Value   Accepting Facility Name, [facility]   Bed Assignment  ED [facility]   Report Given to  HAILEE NGUYEN   Patient Belongings Disposition  Sent with patient      Follow-up Information    None         Discharge Medication List as of 11/3/2019  9:27 AM      CONTINUE these medications which have NOT CHANGED    Details   acetaminophen (TYLENOL) 650 mg CR tablet Take 1 tablet (650 mg total) by mouth every 8 (eight) hours as needed for mild pain, Starting Tue 9/24/2019, Print      amoxicillin-clavulanate (AUGMENTIN) 875-125 mg per tablet Take 1 tablet by mouth every 12 (twelve) hours, Historical Med      DULoxetine HCl (CYMBALTA PO) Take 60 mg by mouth daily at bedtime, Historical Med      furosemide (LASIX) 40 mg tablet Take 40 mg by mouth 2 (two) times a day, Historical Med      METOPROLOL TARTRATE PO Take 50 mg by mouth daily at bedtime, Historical Med      potassium chloride (MICRO-K) 10 MEQ CR capsule Take 20 mEq by mouth daily, Historical Med      MIRTAZAPINE PO Take 7 5 mg by mouth every evening , Historical Med           No discharge procedures on file      ED Provider  Electronically Signed by           Ranjan Gill DO  11/04/19 1924

## 2019-11-03 NOTE — ED NOTES
Spoke to daughter in law, made aware of transfer and agreed to transfer mom  Report given to HAILEE VELIZ ED, RN Vicie Abler RN  11/03/19 4044

## 2019-11-03 NOTE — ANESTHESIA PREPROCEDURE EVALUATION
Review of Systems/Medical History  Patient summary reviewed  Chart reviewed  No history of anesthetic complications     Cardiovascular  Exercise tolerance (METS): <4,  Hypertension , Dysrhythmias , atrial fibrillation, CHF ,   Comment: Per daughter in law, last hospitalized for CHF in May, has been doing well since  They are not aware of any echo or cath results  Poor functional capacity due to arthritis  Can walk 50ft with walker,  Pulmonary  Negative pulmonary ROS        GI/Hepatic  Negative GI/hepatic ROS          Negative  ROS        Endo/Other  Negative endo/other ROS      GYN       Hematology   Musculoskeletal  Negative musculoskeletal ROS        Neurology      Comment: Right preseptal cellulitis with 1 2 cm subcutaneous abscess  Psychology   Anxiety,   Dementia           Physical Exam    Airway    Mallampati score: II  TM Distance: >3 FB  Neck ROM: full     Dental       Cardiovascular  Rhythm: regular,     Pulmonary  Breath sounds clear to auscultation,     Other Findings        Anesthesia Plan  ASA Score- 3     Anesthesia Type- general with ASA Monitors  Additional Monitors: arterial line  Airway Plan: ETT  Comment: Consent obtained by pt's daughter in law Alysia Matson  Discussed GETA with rashel if hemodynamic instability intraop due to unknown EF/valvular problems  Risks and benefits discussed  All questions answered  Phone consent signed        Plan Factors-    Induction- intravenous  Postoperative Plan- Plan for postoperative opioid use  Planned trial extubation    Informed Consent- Anesthetic plan and risks discussed with patient (daughter in law)  I personally reviewed this patient with the CRNA  Discussed and agreed on the Anesthesia Plan with the CRNA  Beti Pac

## 2019-11-03 NOTE — ASSESSMENT & PLAN NOTE
· Currently controlled on current regimen  · Cont metoprolol tartarate 50 mg HS  · Not been on [facility] [place]

## 2019-11-03 NOTE — CONSULTS
Consultation - ENT   Mireya Bangura 80 y o  female MRN: [id number]  Unit/Bed#: ED 08 Encounter: [id number]      Assessment/Plan     Assessment:  Right preseptal cellulitis with 1 2 cm subcutaneous abscess centered within the nasolacrimal duct system  The etiology of this infection is likely from a right dacryocystitis with obstruction but cannot completely exclude ethmoid sinusitis as there is one opacified ethmoid cell adjacent to the lamina on the right with thin/dehiscent bone  The remaining sinuses have mild disease on CT with the left side actually worse than the right including left maxillary sinus opacification  Alzheimer's dementia  Hx A-fib, CHF          Plan:  Needs surgical drainage of abscess via transcutaneous approach with possible nasal endoscopy and right ethmoidectomy  After speaking with ophthalmologist Dr Nadia Castillo who also evaluated the patient, we agreed that I&D is appropriate and that risk of stenosis, scar is mild  He recommended proceeding with I&D  Discussed case with Holley Lockhart (daughter in law, [place]) and son Sarai Tan regarding findings and recommendations  They are on board with surgical drainage  Informed consent obtained  Keep NPO for now  Continue IV abx  Cultures pending, additional cultures intraop  Nasal regimen  Possibility for additional steroids if needed  Return to floor postop    History of Present Illness   Physician Requesting Consult: Milagros Swanson MD  Reason for Consult / Principal Problem: right periorbital cellulitis, concern for abscess  HPI: Mireya Bangura is a 80y o  year old female who presented with progressively worsening right eye swelling, purulent drainage from the duct system  Per the son/daughter in law, she has hx of MRSA of the nares and had some crusting in the past   No substantial sinusitis in the past   Patient with dementia and therefore poor historian       CT sinus w and w/o contrast personally reviewed:  Right periorbital cellulitis with 1 2cm preseptal abscess located within the right nasolacrimal duct, adjacent ethmoid cell opacification with thin/dehiscent lamina, mild paranasal sinus disease on the right, right vane bullosa, left maxillary sinus opacification  Inpatient consult to ENT  Consult performed by: Silvia Cruz MD  Consult ordered by: Jovita Lobo MD          Review of Systems  Gen: no fatigue, no fevers/chills  ENT: as per HPI  GI: no nausea  Allergy/Immun: no allergies/asthma  Neuro: as above;   Heme: no bleeding/bruising concerns  Pulm: no SOB; no asthma; no cough  CV: no chest pain or palpitations  Derm: no rashes      Historical Information   Past Medical History:   Diagnosis Date    Alzheimer disease [address] [address]     Anxiety     Hypertension      History reviewed  No pertinent surgical history  Social History   Social History     Substance and Sexual Activity   Alcohol Use Never    Frequency: Never     Social History     Substance and Sexual Activity   Drug Use Never     Social History     Tobacco Use   Smoking Status Never Smoker   Smokeless Tobacco Never Used     Family History: History reviewed  No pertinent family history  Meds/Allergies   all current active meds have been reviewed, current meds:   Current Facility-Administered Medications   Medication Dose Route Frequency    clindamycin (CLEOCIN) IVPB (premix) 600 mg  600 mg Intravenous Q8H    dexamethasone (DECADRON) injection 10 mg  10 mg Intravenous Once    oxymetazoline (AFRIN) 0 05 % nasal spray 2 spray  2 spray Each Nare Q12H [facility]    sodium chloride 0 9 % infusion  50 mL/hr Intravenous Continuous    [START ON 11/4/2019] vancomycin (VANCOCIN) 1,250 mg in sodium chloride 0 9 % 250 mL IVPB  20 mg/kg (Adjusted) Intravenous Q24H    and PTA meds:   Prior to Admission Medications   Prescriptions Last Dose Informant Patient Reported? Taking?    DULoxetine HCl (CYMBALTA PO)   Yes No   Sig: Take 60 mg by mouth daily at bedtime   METOPROLOL TARTRATE PO   Yes No   Sig: Take 50 mg by mouth daily at bedtime   MIRTAZAPINE PO   Yes No   Sig: Take 7 5 mg by mouth every evening    acetaminophen (TYLENOL) 650 mg CR tablet   No No   Sig: Take 1 tablet (650 mg total) by mouth every 8 (eight) hours as needed for mild pain   amoxicillin-clavulanate (AUGMENTIN) 875-125 mg per tablet  Family Member Yes No   Sig: Take 1 tablet by mouth every 12 (twelve) hours   furosemide (LASIX) 40 mg tablet   Yes No   Sig: Take 40 mg by mouth 2 (two) times a day   potassium chloride (MICRO-K) 10 MEQ CR capsule   Yes No   Sig: Take 20 mEq by mouth daily      Facility-Administered Medications: None     Current Facility-Administered Medications on File Prior to Encounter   Medication Dose Route Frequency Provider Last Rate Last Dose    [COMPLETED] clindamycin (CLEOCIN) IVPB (premix) 600 mg  600 mg Intravenous Once [facility]   Stopped at 11/03/19 0737    [COMPLETED] iodixanol (VISIPAQUE) 320 MG/ML injection 85 mL  85 mL Intravenous Once in imaging [facility], DO   85 mL at 11/03/19 0654    [COMPLETED] vancomycin (VANCOCIN) 1,250 mg in sodium chloride 0 9 % 250 mL IVPB  15 mg/kg Intravenous [facility]   Stopped at 11/03/19 0452    [DISCONTINUED] iohexol (OMNIPAQUE) 350 MG/ML injection (MULTI-DOSE) 85 mL  85 mL Intravenous [facility]         Current Outpatient Medications on File Prior to Encounter   Medication Sig Dispense Refill    acetaminophen (TYLENOL) 650 mg CR tablet Take 1 tablet (650 mg total) by mouth every 8 (eight) hours as needed for mild pain 30 tablet 0    amoxicillin-clavulanate (AUGMENTIN) 875-125 mg per tablet Take 1 tablet by mouth every 12 (twelve) hours      DULoxetine HCl (CYMBALTA PO) Take 60 mg by mouth daily at bedtime      furosemide (LASIX) 40 mg tablet Take 40 mg by mouth 2 (two) times a day      METOPROLOL TARTRATE PO Take 50 mg by mouth daily at bedtime      MIRTAZAPINE PO Take 7 5 mg by mouth every evening       potassium chloride (MICRO-K) 10 MEQ CR capsule Take 20 mEq by mouth daily         Allergies   Allergen Reactions    Aspirin     Caffeine     Ciprofloxacin     Crab (Diagnostic)     Shellfish-Derived Products     Strawberry C [Ascorbate]     Tomato          Objective     Vitals:    11/03/19 1014 11/03/19 1015 11/03/19 1130   BP: 130/58  126/73   BP Location: Right arm     Pulse: 66  66   Resp: 20  (!) 29   Temp: 98 4 °F (36 9 °C)     TempSrc: Oral     SpO2: 92% 92%    Weight: 85 3 kg (188 lb 0 8 oz)     Height: 5' 5" (1 651 m)         No intake or output data in the 24 hours ending 11/03/19 1155    Invasive Devices     Peripheral Intravenous Line            Peripheral IV 09/21/19 Right Antecubital 43 days    Peripheral IV 11/03/19 Left;Right Antecubital less than 1 day                Physical Exam  Gen: NAD, awake/alert, no stridor  Head: Normocephalic/atraumatic  Face: symmetric  Eyes: right periorbital edema/erythema with 1 5cm fluctuant region just medial to the medial canthus overlying the nasolacrimal duct; tender at this location; some conjunctival injection on the right; EOM appear to be intact, PERRL; left eye unremarkable  Ears: pinnae unremarkable; EAC patent; TM intact/translucent/without OME  Nose: no external abnormality; nares patent with some crusting anteriorly L>R; no pus; septum mildly deviated; inferior turbinates pink; clear secretions; no pus; no polyps  Oral cavity: no lesions; tongue soft/mobile  Oropharynx: no lesions; tonsils without ulceration/exudate; soft palate/posterior wall unremarkable  Neck:soft, nontender, no masses or LAD  Neuro: alert, CN II-VII grosslly intact  Salivary glands: parotids/submandibular glands soft, nontender    Lab Results:   CBC:   Lab Results   Component Value Date    WBC 9 10 11/03/2019    HGB 12 9 11/03/2019    HCT 40 7 11/03/2019     (H) 11/03/2019     11/03/2019    MCH 33 5 11/03/2019    MCHC 31 7 11/03/2019    RDW 13 3 11/03/2019    MPV 11 0 11/03/2019    NRBC 0 11/03/2019   , CMP:   Lab Results   Component Value Date    SODIUM 137 11/03/2019    K 4 5 11/03/2019    CL 99 (L) 11/03/2019    CO2 30 11/03/2019    BUN 20 11/03/2019    CREATININE 1 08 11/03/2019    CALCIUM 8 8 11/03/2019    AST 12 11/03/2019    ALT 14 11/03/2019    ALKPHOS 122 (H) 11/03/2019    EGFR 44 11/03/2019   , Coags:   Lab Results   Component Value Date    PTT 28 11/03/2019    INR 0 99 11/03/2019     Imaging Studies: I have personally reviewed pertinent films in PACS  EKG, Pathology, and Other Studies: I have personally reviewed pertinent films in PACS    Code Status: Level 3 - DNAR and DNI  Advance Directive and Living Will:      Power of :    POLST:

## 2019-11-04 LAB
ANION GAP SERPL CALCULATED.3IONS-SCNC: 6 MMOL/L (ref 4–13)
BASOPHILS # BLD AUTO: 0 THOUSANDS/ΜL (ref 0–0.1)
BASOPHILS NFR BLD AUTO: 0 % (ref 0–1)
BUN SERPL-MCNC: 23 MG/DL (ref 5–25)
CALCIUM SERPL-MCNC: 8.7 MG/DL (ref 8.3–10.1)
CHLORIDE SERPL-SCNC: 109 MMOL/L (ref 100–108)
CO2 SERPL-SCNC: 25 MMOL/L (ref 21–32)
CREAT SERPL-MCNC: 1.05 MG/DL (ref 0.6–1.3)
EOSINOPHIL # BLD AUTO: 0 THOUSAND/ΜL (ref 0–0.61)
EOSINOPHIL NFR BLD AUTO: 0 % (ref 0–6)
ERYTHROCYTE [DISTWIDTH] IN BLOOD BY AUTOMATED COUNT: 13.2 % (ref 11.6–15.1)
GFR SERPL CREATININE-BSD FRML MDRD: 46 ML/MIN/1.73SQ M
GLUCOSE SERPL-MCNC: 153 MG/DL (ref 65–140)
HCT VFR BLD AUTO: 37.7 % (ref 34.8–46.1)
HGB BLD-MCNC: 12.1 G/DL (ref 11.5–15.4)
IMM GRANULOCYTES # BLD AUTO: 0.02 THOUSAND/UL (ref 0–0.2)
IMM GRANULOCYTES NFR BLD AUTO: 1 % (ref 0–2)
LYMPHOCYTES # BLD AUTO: 1.67 THOUSANDS/ΜL (ref 0.6–4.47)
LYMPHOCYTES NFR BLD AUTO: 39 % (ref 14–44)
MCH RBC QN AUTO: 33.5 PG (ref 26.8–34.3)
MCHC RBC AUTO-ENTMCNC: 32.1 G/DL (ref 31.4–37.4)
MCV RBC AUTO: 104 FL (ref 82–98)
MONOCYTES # BLD AUTO: 0.22 THOUSAND/ΜL (ref 0.17–1.22)
MONOCYTES NFR BLD AUTO: 5 % (ref 4–12)
NEUTROPHILS # BLD AUTO: 2.33 THOUSANDS/ΜL (ref 1.85–7.62)
NEUTS SEG NFR BLD AUTO: 55 % (ref 43–75)
NRBC BLD AUTO-RTO: 0 /100 WBCS
PLATELET # BLD AUTO: 209 THOUSANDS/UL (ref 149–390)
PMV BLD AUTO: 10.8 FL (ref 8.9–12.7)
POTASSIUM SERPL-SCNC: 5.1 MMOL/L (ref 3.5–5.3)
RBC # BLD AUTO: 3.61 MILLION/UL (ref 3.81–5.12)
SODIUM SERPL-SCNC: 140 MMOL/L (ref 136–145)
WBC # BLD AUTO: 4.24 THOUSAND/UL (ref 4.31–10.16)

## 2019-11-04 PROCEDURE — 99232 SBSQ HOSP IP/OBS MODERATE 35: CPT | Performed by: FAMILY MEDICINE

## 2019-11-04 PROCEDURE — 85025 COMPLETE CBC W/AUTO DIFF WBC: CPT | Performed by: HOSPITALIST

## 2019-11-04 PROCEDURE — 97167 OT EVAL HIGH COMPLEX 60 MIN: CPT

## 2019-11-04 PROCEDURE — 97163 PT EVAL HIGH COMPLEX 45 MIN: CPT

## 2019-11-04 PROCEDURE — 97535 SELF CARE MNGMENT TRAINING: CPT

## 2019-11-04 PROCEDURE — G8987 SELF CARE CURRENT STATUS: HCPCS

## 2019-11-04 PROCEDURE — G8988 SELF CARE GOAL STATUS: HCPCS

## 2019-11-04 PROCEDURE — G8978 MOBILITY CURRENT STATUS: HCPCS

## 2019-11-04 PROCEDURE — 99233 SBSQ HOSP IP/OBS HIGH 50: CPT | Performed by: INTERNAL MEDICINE

## 2019-11-04 PROCEDURE — 80048 BASIC METABOLIC PNL TOTAL CA: CPT | Performed by: HOSPITALIST

## 2019-11-04 PROCEDURE — G8979 MOBILITY GOAL STATUS: HCPCS

## 2019-11-04 RX ORDER — OXYMETAZOLINE HYDROCHLORIDE 0.05 G/100ML
2 SPRAY NASAL EVERY 12 HOURS PRN
Status: DISCONTINUED | OUTPATIENT
Start: 2019-11-04 | End: 2019-11-06

## 2019-11-04 RX ORDER — ECHINACEA PURPUREA EXTRACT 125 MG
2 TABLET ORAL
Status: DISCONTINUED | OUTPATIENT
Start: 2019-11-04 | End: 2019-11-06 | Stop reason: HOSPADM

## 2019-11-04 RX ADMIN — Medication 2 SPRAY: at 21:45

## 2019-11-04 RX ADMIN — Medication 2 SPRAY: at 10:17

## 2019-11-04 RX ADMIN — BACITRACIN: 500 OINTMENT OPHTHALMIC at 21:45

## 2019-11-04 RX ADMIN — ENOXAPARIN SODIUM 40 MG: 40 INJECTION SUBCUTANEOUS at 08:31

## 2019-11-04 RX ADMIN — BACITRACIN: 500 OINTMENT OPHTHALMIC at 08:27

## 2019-11-04 RX ADMIN — CLINDAMYCIN PHOSPHATE 600 MG: 600 INJECTION, SOLUTION INTRAVENOUS at 05:07

## 2019-11-04 RX ADMIN — Medication 2 SPRAY: at 14:09

## 2019-11-04 RX ADMIN — VANCOMYCIN HYDROCHLORIDE 1250 MG: 10 INJECTION, POWDER, LYOPHILIZED, FOR SOLUTION INTRAVENOUS at 02:47

## 2019-11-04 RX ADMIN — SODIUM CHLORIDE 50 ML/HR: 0.9 INJECTION, SOLUTION INTRAVENOUS at 08:41

## 2019-11-04 RX ADMIN — CLINDAMYCIN PHOSPHATE 600 MG: 600 INJECTION, SOLUTION INTRAVENOUS at 21:48

## 2019-11-04 RX ADMIN — DEXTRAN 70 AND HYPROMELLOSE 2910 1 DROP: 1; 3 SOLUTION/ DROPS OPHTHALMIC at 12:01

## 2019-11-04 RX ADMIN — CLINDAMYCIN PHOSPHATE 600 MG: 600 INJECTION, SOLUTION INTRAVENOUS at 14:08

## 2019-11-04 RX ADMIN — DEXTRAN 70 AND HYPROMELLOSE 2910 1 DROP: 1; 3 SOLUTION/ DROPS OPHTHALMIC at 10:17

## 2019-11-04 RX ADMIN — DEXTRAN 70 AND HYPROMELLOSE 2910 1 DROP: 1; 3 SOLUTION/ DROPS OPHTHALMIC at 14:08

## 2019-11-04 RX ADMIN — DEXTRAN 70 AND HYPROMELLOSE 2910 1 DROP: 1; 3 SOLUTION/ DROPS OPHTHALMIC at 05:09

## 2019-11-04 RX ADMIN — DEXTRAN 70 AND HYPROMELLOSE 2910 1 DROP: 1; 3 SOLUTION/ DROPS OPHTHALMIC at 21:45

## 2019-11-04 RX ADMIN — DEXTRAN 70 AND HYPROMELLOSE 2910 1 DROP: 1; 3 SOLUTION/ DROPS OPHTHALMIC at 08:27

## 2019-11-04 NOTE — ASSESSMENT & PLAN NOTE
Wt Readings from Last 3 Encounters:   11/03/19 85 3 kg (188 lb 0 8 oz)   11/03/19 85 3 kg (188 lb)   11/03/19 85 3 kg (188 lb 0 8 oz)       · H/o volume overload in past  · No echo available to determine systolic or diastolic  · On lasix 40 BID at home with PO potassium  · hold them both for now  · Has doctors primarily at [facility]

## 2019-11-04 NOTE — PROGRESS NOTES
Mireya Bangura is a 80 y o  female who is currently receiving Vancomycin IV with management by the Pharmacy Consult service  Relevant clinical data and objective history reviewed:  Temp Readings from Last 3 Encounters:   11/04/19 97 6 °F (36 4 °C)   11/03/19 97 7 °F (36 5 °C) (Tympanic)   09/24/19 (!) 97 1 °F (36 2 °C) (Tympanic)     /57   Pulse 69   Temp 97 6 °F (36 4 °C)   Resp 18   Ht 5' 5" (1 651 m)   Wt 85 3 kg (188 lb 0 8 oz)   LMP  (LMP Unknown)   SpO2 95%   BMI 31 29 kg/m²     I/O last 3 completed shifts: In: 1420 9 [I V :1070 8; IV Piggyback:350 1]  Out: 20 [Blood:20]    Lab Results   Component Value Date/Time    BUN 23 11/04/2019 05:34 AM    WBC 4 24 (L) 11/04/2019 05:34 AM    HGB 12 1 11/04/2019 05:34 AM    HCT 37 7 11/04/2019 05:34 AM     (H) 11/04/2019 05:34 AM     11/04/2019 05:34 AM     Creatinine   Date Value Ref Range Status   11/04/2019 1 05 0 60 - 1 30 mg/dL Final     Comment:     Standardized to IDMS reference method   11/03/2019 1 08 0 60 - 1 30 mg/dL Final     Comment:     Standardized to IDMS reference method         Vancomycin Assessment:  Indication: skin-soft tissue infection; periorbital abscess  Status: stable  Micro:         11/4 wound culture: no bacteria seen  11/3 blood cultures x 2:  in process        11/3 MRSA culture nasal: in process  Procalcitonin: none  Renal Function: stable  Potential Nephrotoxicity Factors:  Medications: none  Patient-Factors: elderly  Days of Therapy: 2  Current Dose: 1,250 mg q24h  Goal Trough: 15-20 (appropriate for most indications)   Goal AUC(24h): 400-600  Last Level: n/a      Vancomycin Plan:  Dosing: continue 1250mg iv q24h   Next Level: 11/6 @0230  Renal Function Monitoring:continue to monitor SCr and UOP  Pharmacy will continue to follow closely for s/sx of nephrotoxicity, infusion reactions and appropriateness of therapy  BMP and CBC will be ordered per protocol   We will continue to follow the patient's culture results and clinical progress daily  Florencia Cockayne, PharmD   [address]

## 2019-11-04 NOTE — PROGRESS NOTES
Progress Note - Infectious Disease   Ekta Vyas 80 y o  female MRN: [id number]  Unit/Bed#: [facility] 829-01 Encounter: [id number]      IMPRESSION & RECOMMENDATIONS:   Impression/Recommendations:  1  Severe right preseptal cellulitis with abscess within nasolacrimal duct  CT negative for postseptal extension  ENT evaluation noted  Etiology is likely right dacryocystitis with obstruction  Also consideration for ethmoid sinusitis as there is some ethmoid cell opacification  Patient does have history of MRSA positive nasal culture  Suspect Staph aureus infection but cannot yet exclude other organisms, including anaerobes  Patient is now status post I and D of periorbital abscess and right ethmoidectomy on 11/03  Operative cultures are pending     -continue IV vancomycin for now with dosing recommendations per pharmacy as this may be clindamycin-resistant MRSA infection  -continue IV clindamycin for now pending operative cultures  If OR cultures indicate Staph aureus, will plan to discontinue clindamycin   -follow up operative cultures to guide final antibiotic recommendations  -close ENT and Ophthalmology follow-up ongoing  -serial exams  -monitor temperatures and hemodynamics  -monitor CBC  -follow up pending blood cultures     2  MRSA positive nares culture  Patient has history of MRSA colonization  Above infection may be secondary to MRSA  Will continue antibiotic coverage as stated above      3  Alzheimer's dementia  Patient is awake and alert  Mentation is limited      4  Chronic CHF  No clinical evidence of acute exacerbation      Antibiotics:  Vancomycin/clindamycin 2  POD 1     I discussed above plan with patient, and previously with Dr Adonis Trammell from primary service  Subjective:  Patient went to OR yesterday for I and D of periorbital abscess and right ethmoidectomy  She is pleasantly confused  Denies pain  No fevers, chills      Objective:  Vitals:  Temp:  [97 3 °F (36 3 °C)-99 4 °F (37 4 °C)] 97 6 °F (36 4 °C)  HR:  [62-80] 69  Resp:  [16-26] 18  BP: ()/(56-80) 108/57  SpO2:  [92 %-97 %] 95 %  Temp (24hrs), Av 1 °F (36 7 °C), Min:97 3 °F (36 3 °C), Max:99 4 °F (37 4 °C)  Current: Temperature: 97 6 °F (36 4 °C)    Physical Exam:   General:  Awake, alert, pleasantly confused  Eyes:  Improving periorbital erythema, edema  No active drainage  Oropharynx:  No ulcers, no lesions  Neck:  Supple, no lymphadenopathy  Lungs:  Clear to auscultation bilaterally, no accessory muscle use  Cardiac:  Regular rate and rhythm, no murmurs  Abdomen:  Soft, non-tender, non-distented  Extremities:  No peripheral cyanosis, clubbing, or edema  Skin:  No rashes, no ulcers  Neurological:  Moves all four extremities spontaneously    Lab Results:  I have personally reviewed pertinent labs  Results from last 7 days   Lab Units 19  0534 19  0203   POTASSIUM mmol/L 5 1 4 5   CHLORIDE mmol/L 109* 99*   CO2 mmol/L 25 30   BUN mg/dL 23 20   CREATININE mg/dL 1 05 1 08   EGFR ml/min/1 73sq m 46 44   CALCIUM mg/dL 8 7 8 8   AST U/L  --  12   ALT U/L  --  14   ALK PHOS U/L  --  122*     Results from last 7 days   Lab Units 19  0534 19  0203   WBC Thousand/uL 4 24* 9 10   HEMOGLOBIN g/dL 12 1 12 9   PLATELETS Thousands/uL 209 215     Results from last 7 days   Lab Units 19  1532 19  0203   GRAM STAIN RESULT  1+ Polys  No bacteria seen No Polys or Bacteria seen       Imaging Studies:   I have personally reviewed pertinent imaging study reports and images in PACS  EKG, Pathology, and Other Studies:   I have personally reviewed pertinent reports

## 2019-11-04 NOTE — ASSESSMENT & PLAN NOTE
· Cont home metoprolol tartarate 50 mg HS  · Lasix on hold as NPO & infection - restart likely tomorrow

## 2019-11-04 NOTE — ASSESSMENT & PLAN NOTE
· CT facial bones:  Evidence of right dacrocystitis  with associated preseptal soft tissue cellulitis and 1 2 cm abscess  · Clinically stable  · No evidence of sepsis  · S/p IV Vanco & clindamycin in ED - will continue that until ID eval  · Ordered blood cultures  · Wound cultures sent at [facility]  · Status post I&D yesterday  · Of the multi and ENT consult appreciated

## 2019-11-04 NOTE — ASSESSMENT & PLAN NOTE
· Currently controlled on current regimen  · Cont metoprolol tartarate 50 mg HS  · Not been on [facility]

## 2019-11-04 NOTE — PLAN OF CARE
Problem: OCCUPATIONAL THERAPY ADULT  Goal: Performs self-care activities at highest level of function for planned discharge setting  See evaluation for individualized goals  Description  Treatment Interventions: ADL retraining, Functional transfer training, Endurance training, Patient/family training, Equipment evaluation/education, Compensatory technique education, Activityengagement  Equipment Recommended: (pt has all DME in home environement)       See flowsheet documentation for full assessment, interventions and recommendations  Note:   Limitation: Decreased ADL status, Decreased Safe judgement during ADL, Decreased cognition, Decreased endurance, Decreased high-level ADLs, Decreased self-care trans, Decreased fine motor control  Prognosis: Poor  Assessment: Pt is a 80 y o  female who was admitted from [facility] on 11/3/2019 with right eye swelling, Abscess of periorbital region, right,s/p incision and drainage (I&D) right periorbital abscess; right dacryocystorhinostomy/dilation of nasolacrimal duct (Right) Bilateral nasal endoscopy, right anterior ethmoidectomy on 11/3/19, dementia, lesion of lung, MRSA exposure, a-fib, CHF, HTN, gout, kyphosis     Pt's problem list also includes PMH of alzheimer's dementia, anxiety, HTN   At baseline pt was completing assistance with ADL's/IADL's-per DIL pt able to perform self feeding/grooming tasks with set-up and verbal cues for sequencing tasks +RW for functional transfers/household mobility, +w/c community distances, +caregivers 21 hours a day/DIL assist   Pt lives with son, DIL +first floor set-up with ramped entrance  Currently pt requires max a for overall ADLS and max a x 2 bed mobility and mod a x 1 and SBA of another for safety for functional mobility, bed, chair transfers   Pt currently presents with impairments in the following categories -difficulty performing ADLS, limited insight into deficits, flat affect and decreased initiation and engagement  activity tolerance, endurance, standing balance/tolerance, sitting balance/tolerance, arousal, memory, insight, safety , judgement , attention , sequencing , task initiation , coping skills , communication and interpersonal skills  These impairments, as well as pt's fatigue and risk for falls  limit pt's ability to safely engage in all baseline areas of occupation, includingeating, grooming, bathing, dressing, toileting, functional mobility/transfers, community mobility and leisure activities  From OT standpoint, recommend home with family and caregiver support 24/7 upon D/C  OT will continue to follow to address the below stated goals  OT Discharge Recommendation: 24 hour supervision/assist  OT - OK to Discharge:  Yes

## 2019-11-04 NOTE — PLAN OF CARE
Problem: Prexisting or High Potential for Compromised Skin Integrity  Goal: Skin integrity is maintained or improved  Description  INTERVENTIONS:  - Identify patients at risk for skin breakdown  - Assess and monitor skin integrity  - Assess and monitor nutrition and hydration status  - Monitor labs   - Assess for incontinence   - Turn and reposition patient  - Assist with mobility/ambulation  - Relieve pressure over bony prominences  - Avoid friction and shearing  - Provide appropriate hygiene as needed including keeping skin clean and dry  - Evaluate need for skin moisturizer/barrier cream  - Collaborate with interdisciplinary team   - Patient/family teaching  - Consider wound care consult   Outcome: Progressing     Problem: Potential for Falls  Goal: Patient will remain free of falls  Description  INTERVENTIONS:  - Assess patient frequently for physical needs  -  Identify cognitive and physical deficits and behaviors that affect risk of falls    -  [place] fall precautions as indicated by assessment   - Educate patient/family on patient safety including physical limitations  - Instruct patient to call for assistance with activity based on assessment  - Modify environment to reduce risk of injury  - Consider OT/PT consult to assist with strengthening/mobility  Outcome: Progressing     Problem: PAIN - ADULT  Goal: Verbalizes/displays adequate comfort level or baseline comfort level  Description  Interventions:  - Encourage patient to monitor pain and request assistance  - Assess pain using appropriate pain scale  - Administer analgesics based on type and severity of pain and evaluate response  - Implement non-pharmacological measures as appropriate and evaluate response  - Consider cultural and social influences on pain and pain management  - Notify physician/advanced practitioner if interventions unsuccessful or patient reports new pain  Outcome: Progressing     Problem: INFECTION - ADULT  Goal: Absence or prevention of progression during hospitalization  Description  INTERVENTIONS:  - Assess and monitor for signs and symptoms of infection  - Monitor lab/diagnostic results  - Monitor all insertion sites, i e  indwelling lines, tubes, and drains  - Monitor endotracheal if appropriate and nasal secretions for changes in amount and color  - [place] appropriate cooling/warming therapies per order  - Administer medications as ordered  - Instruct and encourage patient and family to use good hand hygiene technique  - Identify and instruct in appropriate isolation precautions for identified infection/condition  Outcome: Progressing     Problem: SAFETY ADULT  Goal: Maintain or return to baseline ADL function  Description  INTERVENTIONS:  -  Assess patient's ability to carry out ADLs; assess patient's baseline for ADL function and identify physical deficits which impact ability to perform ADLs (bathing, care of mouth/teeth, toileting, grooming, dressing, etc )  - Assess/evaluate cause of self-care deficits   - Assess range of motion  - Assess patient's mobility; develop plan if impaired  - Assess patient's need for assistive devices and provide as appropriate  - Encourage maximum independence but intervene and supervise when necessary  - Involve family in performance of ADLs  - Assess for home care needs following discharge   - Consider OT consult to assist with ADL evaluation and planning for discharge  - Provide patient education as appropriate  Outcome: Progressing  Goal: Maintain or return mobility status to optimal level  Description  INTERVENTIONS:  - Assess patient's baseline mobility status (ambulation, transfers, stairs, etc )    - Identify cognitive and physical deficits and behaviors that affect mobility  - Identify mobility aids required to assist with transfers and/or ambulation (gait belt, sit-to-stand, lift, walker, cane, etc )  - [place] fall precautions as indicated by assessment  - Record patient progress and toleration of activity level on Mobility SBAR; progress patient to next Phase/Stage  - Instruct patient to call for assistance with activity based on assessment  - Consider rehabilitation consult to assist with strengthening/weightbearing, etc   Outcome: Progressing     Problem: DISCHARGE PLANNING  Goal: Discharge to home or other facility with appropriate resources  Description  INTERVENTIONS:  - Identify barriers to discharge w/patient and caregiver  - Arrange for needed discharge resources and transportation as appropriate  - Identify discharge learning needs (meds, wound care, etc )  - Arrange for interpretive services to assist at discharge as needed  - Refer to Case Management Department for coordinating discharge planning if the patient needs post-hospital services based on physician/advanced practitioner order or complex needs related to functional status, cognitive ability, or social support system  Outcome: Progressing     Problem: Knowledge Deficit  Goal: Patient/family/caregiver demonstrates understanding of disease process, treatment plan, medications, and discharge instructions  Description  Complete learning assessment and assess knowledge base    Interventions:  - Provide teaching at level of understanding  - Provide teaching via preferred learning methods  Outcome: Progressing     Problem: SKIN/TISSUE INTEGRITY - ADULT  Goal: Skin integrity remains intact  Description  INTERVENTIONS  - Identify patients at risk for skin breakdown  - Assess and monitor skin integrity  - Assess and monitor nutrition and hydration status  - Monitor labs (i e  albumin)  - Assess for incontinence   - Turn and reposition patient  - Assist with mobility/ambulation  - Relieve pressure over bony prominences  - Avoid friction and shearing  - Provide appropriate hygiene as needed including keeping skin clean and dry  - Evaluate need for skin moisturizer/barrier cream  - Collaborate with interdisciplinary team (i e  Nutrition, Rehabilitation, etc )   - Patient/family teaching  Outcome: Progressing  Goal: Incision(s), wounds(s) or drain site(s) healing without S/S of infection  Description  INTERVENTIONS  - Assess and document risk factors for skin impairment   - Assess and document dressing, incision, wound bed, drain sites and surrounding tissue  - Consider nutrition services referral as needed  - Oral mucous membranes remain intact  - Provide patient/ family education  Outcome: Progressing     Problem: SAFETY,RESTRAINT: NV/NON-SELF DESTRUCTIVE BEHAVIOR  Goal: Remains free of harm/injury (restraint for non violent/non self-detsructive behavior)  Description  INTERVENTIONS:  - Instruct patient/family regarding restraint use   - Assess and monitor physiologic and psychological status   - Provide interventions and comfort measures to meet assessed patient needs   - Identify and implement measures to help patient regain control  - Assess readiness for release of restraint   Outcome: Progressing  Goal: Returns to optimal restraint-free functioning  Description  INTERVENTIONS:  - Assess the patient's behavior and symptoms that indicate continued need for restraint  - Identify and implement measures to help patient regain control  - Assess readiness for release of restraint   Outcome: Progressing     Problem: Nutrition/Hydration-ADULT  Goal: Nutrient/Hydration intake appropriate for improving, restoring or maintaining nutritional needs  Description  Monitor and assess patient's nutrition/hydration status for malnutrition  Collaborate with interdisciplinary team and initiate plan and interventions as ordered  Monitor patient's weight and dietary intake as ordered or per policy  Utilize nutrition screening tool and intervene as necessary  Determine patient's food preferences and provide high-protein, high-caloric foods as appropriate       INTERVENTIONS:  - Monitor oral intake, urinary output, labs, and treatment plans  - Assess nutrition and hydration status and recommend course of action  - Evaluate amount of meals eaten  - Assist patient with eating if necessary   - Allow adequate time for meals  - Recommend/ encourage appropriate diets, oral nutritional supplements, and vitamin/mineral supplements  - Order, calculate, and assess calorie counts as needed  - Recommend, monitor, and adjust tube feedings and TPN/PPN based on assessed needs  - Assess need for intravenous fluids  - Provide specific nutrition/hydration education as appropriate  - Include patient/family/caregiver in decisions related to nutrition  Outcome: Progressing

## 2019-11-04 NOTE — SOCIAL WORK
Patient lives with her son and daughter in law in a mini suite they altered his house to accommodate the patient  She has been living with them for over a year prior to that she lived in assisted living for 3 years until her long term care insurance   Patient has ramp entrance  She has rolling walker, shower chair, handicap shower, she has a wheelchair that tilts, she has a hospital bed  POA is her son CM requested copy of her POA for hospital records  Patient has a electronic reclining chair  Patient has private care givers for a total of 21 hours per day  Daughter in [place] [phone]  Is the primary caregiver  Patient needs extensive assistant with ADLS  Family sometimes needs to assist with feeding  Patient private pays her caregivers  Either a private caregiver or the daughter in law Eric Bartonin sits at the bedside overnight to monitor patient for falls and assist to the bathroom  Family does not keep side rails on the bed because the patient tries to climb over them  Patient has a history of VNA daughter in law did no know the name  Family uses a medical transport company to transport the patient in a wheelchair Vernadine Shells  Patient does not recognize her own family per Eric Kaye  Patient was living in a facility for three years prior to the family renovating their home for her to live with them

## 2019-11-04 NOTE — PROGRESS NOTES
POD1 s/p I&D right periorbital abscess, right ethmoidectomy    Did well overnight    Vitals:    11/03/19 1838 11/03/19 1943 11/03/19 2040 11/03/19 2329   BP: 141/73 142/74 142/71 99/56   BP Location:       Pulse: 71 70 68 62   Resp: 18 16 20 16   Temp: 99 4 °F (37 4 °C) 97 7 °F (36 5 °C) 98 1 °F (36 7 °C) (!) 97 3 °F (36 3 °C)   TempSrc:       SpO2: 93% 96% 97% 97%   Weight:       Height:         EXAM:  NAD, more bright  Right eye more open with less edema/induration  Still with periorbital erythema but improved  Dry blood in nares R>L and dry blood posterior oropharyngeal wall    A/P: as above   Stable/doing well  -saline sprays every 4 hours (rinses preferred but unlikely to tolerate)  -afrin PRN nosebleed  -bacitracin ophthalmic BID to incision  -cont abx and await culture results  -ENT follow up as outpatient to monitor eye, sinuses, and evaluate supraclavicular LAD on CT

## 2019-11-04 NOTE — PLAN OF CARE
Problem: Prexisting or High Potential for Compromised Skin Integrity  Goal: Skin integrity is maintained or improved  Description  INTERVENTIONS:  - Identify patients at risk for skin breakdown  - Assess and monitor skin integrity  - Assess and monitor nutrition and hydration status  - Monitor labs   - Assess for incontinence   - Turn and reposition patient  - Assist with mobility/ambulation  - Relieve pressure over bony prominences  - Avoid friction and shearing  - Provide appropriate hygiene as needed including keeping skin clean and dry  - Evaluate need for skin moisturizer/barrier cream  - Collaborate with interdisciplinary team   - Patient/family teaching  - Consider wound care consult   Outcome: Progressing     Problem: Potential for Falls  Goal: Patient will remain free of falls  Description  INTERVENTIONS:  - Assess patient frequently for physical needs  -  Identify cognitive and physical deficits and behaviors that affect risk of falls    -  [place] fall precautions as indicated by assessment   - Educate patient/family on patient safety including physical limitations  - Instruct patient to call for assistance with activity based on assessment  - Modify environment to reduce risk of injury  - Consider OT/PT consult to assist with strengthening/mobility  Outcome: Progressing     Problem: PAIN - ADULT  Goal: Verbalizes/displays adequate comfort level or baseline comfort level  Description  Interventions:  - Encourage patient to monitor pain and request assistance  - Assess pain using appropriate pain scale  - Administer analgesics based on type and severity of pain and evaluate response  - Implement non-pharmacological measures as appropriate and evaluate response  - Consider cultural and social influences on pain and pain management  - Notify physician/advanced practitioner if interventions unsuccessful or patient reports new pain  Outcome: Progressing     Problem: INFECTION - ADULT  Goal: Absence or prevention of progression during hospitalization  Description  INTERVENTIONS:  - Assess and monitor for signs and symptoms of infection  - Monitor lab/diagnostic results  - Monitor all insertion sites, i e  indwelling lines, tubes, and drains  - Monitor endotracheal if appropriate and nasal secretions for changes in amount and color  - [place] appropriate cooling/warming therapies per order  - Administer medications as ordered  - Instruct and encourage patient and family to use good hand hygiene technique  - Identify and instruct in appropriate isolation precautions for identified infection/condition  Outcome: Progressing     Problem: SAFETY ADULT  Goal: Maintain or return to baseline ADL function  Description  INTERVENTIONS:  -  Assess patient's ability to carry out ADLs; assess patient's baseline for ADL function and identify physical deficits which impact ability to perform ADLs (bathing, care of mouth/teeth, toileting, grooming, dressing, etc )  - Assess/evaluate cause of self-care deficits   - Assess range of motion  - Assess patient's mobility; develop plan if impaired  - Assess patient's need for assistive devices and provide as appropriate  - Encourage maximum independence but intervene and supervise when necessary  - Involve family in performance of ADLs  - Assess for home care needs following discharge   - Consider OT consult to assist with ADL evaluation and planning for discharge  - Provide patient education as appropriate  Outcome: Progressing  Goal: Maintain or return mobility status to optimal level  Description  INTERVENTIONS:  - Assess patient's baseline mobility status (ambulation, transfers, stairs, etc )    - Identify cognitive and physical deficits and behaviors that affect mobility  - Identify mobility aids required to assist with transfers and/or ambulation (gait belt, sit-to-stand, lift, walker, cane, etc )  - [place] fall precautions as indicated by assessment  - Record patient progress and toleration of activity level on Mobility SBAR; progress patient to next Phase/Stage  - Instruct patient to call for assistance with activity based on assessment  - Consider rehabilitation consult to assist with strengthening/weightbearing, etc   Outcome: Progressing     Problem: DISCHARGE PLANNING  Goal: Discharge to home or other facility with appropriate resources  Description  INTERVENTIONS:  - Identify barriers to discharge w/patient and caregiver  - Arrange for needed discharge resources and transportation as appropriate  - Identify discharge learning needs (meds, wound care, etc )  - Arrange for interpretive services to assist at discharge as needed  - Refer to Case Management Department for coordinating discharge planning if the patient needs post-hospital services based on physician/advanced practitioner order or complex needs related to functional status, cognitive ability, or social support system  Outcome: Progressing     Problem: Knowledge Deficit  Goal: Patient/family/caregiver demonstrates understanding of disease process, treatment plan, medications, and discharge instructions  Description  Complete learning assessment and assess knowledge base    Interventions:  - Provide teaching at level of understanding  - Provide teaching via preferred learning methods  Outcome: Progressing     Problem: SKIN/TISSUE INTEGRITY - ADULT  Goal: Skin integrity remains intact  Description  INTERVENTIONS  - Identify patients at risk for skin breakdown  - Assess and monitor skin integrity  - Assess and monitor nutrition and hydration status  - Monitor labs (i e  albumin)  - Assess for incontinence   - Turn and reposition patient  - Assist with mobility/ambulation  - Relieve pressure over bony prominences  - Avoid friction and shearing  - Provide appropriate hygiene as needed including keeping skin clean and dry  - Evaluate need for skin moisturizer/barrier cream  - Collaborate with interdisciplinary team (i e  Nutrition, Rehabilitation, etc )   - Patient/family teaching  Outcome: Progressing  Goal: Incision(s), wounds(s) or drain site(s) healing without S/S of infection  Description  INTERVENTIONS  - Assess and document risk factors for skin impairment   - Assess and document dressing, incision, wound bed, drain sites and surrounding tissue  - Consider nutrition services referral as needed  - Oral mucous membranes remain intact  - Provide patient/ family education  Outcome: Progressing     Problem: SAFETY,RESTRAINT: NV/NON-SELF DESTRUCTIVE BEHAVIOR  Goal: Remains free of harm/injury (restraint for non violent/non self-detsructive behavior)  Description  INTERVENTIONS:  - Instruct patient/family regarding restraint use   - Assess and monitor physiologic and psychological status   - Provide interventions and comfort measures to meet assessed patient needs   - Identify and implement measures to help patient regain control  - Assess readiness for release of restraint   Outcome: Progressing  Goal: Returns to optimal restraint-free functioning  Description  INTERVENTIONS:  - Assess the patient's behavior and symptoms that indicate continued need for restraint  - Identify and implement measures to help patient regain control  - Assess readiness for release of restraint   Outcome: Progressing     Problem: Nutrition/Hydration-ADULT  Goal: Nutrient/Hydration intake appropriate for improving, restoring or maintaining nutritional needs  Description  Monitor and assess patient's nutrition/hydration status for malnutrition  Collaborate with interdisciplinary team and initiate plan and interventions as ordered  Monitor patient's weight and dietary intake as ordered or per policy  Utilize nutrition screening tool and intervene as necessary  Determine patient's food preferences and provide high-protein, high-caloric foods as appropriate       INTERVENTIONS:  - Monitor oral intake, urinary output, labs, and treatment plans  - Assess nutrition and hydration status and recommend course of action  - Evaluate amount of meals eaten  - Assist patient with eating if necessary   - Allow adequate time for meals  - Recommend/ encourage appropriate diets, oral nutritional supplements, and vitamin/mineral supplements  - Order, calculate, and assess calorie counts as needed  - Recommend, monitor, and adjust tube feedings and TPN/PPN based on assessed needs  - Assess need for intravenous fluids  - Provide specific nutrition/hydration education as appropriate  - Include patient/family/caregiver in decisions related to nutrition  Outcome: Progressing

## 2019-11-04 NOTE — PROGRESS NOTES
Progress Note - Bora Palmer 12/1/1925, 80 y o  female MRN: [id number]    Unit/Bed#: [facility] 829-01 Encounter: [id number]    Primary Care Provider: Eliseo Jones MD   Date and time admitted to hospital: 11/3/2019 10:05 AM        * Abscess of periorbital region, right  Assessment & Plan  · CT facial bones:  Evidence of right dacrocystitis  with associated preseptal soft tissue cellulitis and 1 2 cm abscess  · Clinically stable  · No evidence of sepsis  · S/p IV Vanco & clindamycin in ED - will continue that until ID eval  · Ordered blood cultures  · Wound cultures sent at [facility]  · Status post I&D yesterday  · Of the multi and ENT consult appreciated    Dementia [facility])  Assessment & Plan  · Patient with significant baseline dementia according being to the daughter-in-law hold the POA    Patient is confused for more time then being oriented  · Is with son and daughter-in-law daughter-in-law is the primary caregiver  · Continue home Cymbalta 60 mg HS and Remeron 15 mg HS  · Monitor for delirium and confusion  · If needed may have to consider one-to-one observer    Lesion of lung  Assessment & Plan  · H/o LLL lesion, couldn't be biopsed  · Has disappeared with time per daughter in law    MRSA exposure  Assessment & Plan  · H/o MRSA infection in past  · needing 6 week IV Vanco  · They couldn't do MRI of spine  · They suspected it was in spine    A-fib [facility])  Assessment & Plan  · Currently controlled on current regimen  · Cont metoprolol tartarate 50 mg HS  · Not been on [facility]    Chronic congestive heart failure (HCC)  Assessment & Plan  Wt Readings from Last 3 Encounters:   11/03/19 85 3 kg (188 lb 0 8 oz)   11/03/19 85 3 kg (188 lb)   11/03/19 85 3 kg (188 lb 0 8 oz)       · H/o volume overload in past  · No echo available to determine systolic or diastolic  · On lasix 40 BID at home with PO potassium  · hold them both for now  · Has doctors primarily at [facility]      Hypertension  Assessment & Plan  · Cont home metoprolol tartarate 50 mg HS  · Lasix on hold as NPO & infection - restart likely tomorrow      VTE Pharmacologic Prophylaxis:   Pharmacologic: Enoxaparin (Lovenox)  Mechanical VTE Prophylaxis in Place: Yes    Patient Centered Rounds: I have performed bedside rounds with nursing staff today  Discussions with Specialists or Other Care Team Provider:     Education and Discussions with Family / Patient:     Time Spent for Care: 30 minutes  More than 50% of total time spent on counseling and coordination of care as described above  Current Length of Stay: 1 day(s)    Current Patient Status: Inpatient   Certification Statement: The patient will continue to require additional inpatient hospital stay due to IV antibiotics    Discharge Plan:     Code Status: Level 3 - DNAR and DNI      Subjective:   Patient seen and examined  No events overnight    Objective:     Vitals:   Temp (24hrs), Av 6 °F (36 4 °C), Min:97 3 °F (36 3 °C), Max:98 1 °F (36 7 °C)    Temp:  [97 3 °F (36 3 °C)-98 1 °F (36 7 °C)] 97 5 °F (36 4 °C)  HR:  [62-70] 70  Resp:  [16-20] 18  BP: ()/(56-74) 122/64  SpO2:  [93 %-97 %] 93 %  Body mass index is 31 29 kg/m²  Input and Output Summary (last 24 hours): Intake/Output Summary (Last 24 hours) at 2019 1847  Last data filed at 2019 1833  Gross per 24 hour   Intake 870 89 ml   Output 0 ml   Net 870 89 ml       Physical Exam:     Physical Exam   Constitutional: She appears well-developed  No distress  HENT:   Periorbital erythema on the right   Eyes: Right eye exhibits no discharge  Left eye exhibits no discharge  Neck: No JVD present  Cardiovascular: Normal rate and regular rhythm  No murmur heard  Pulmonary/Chest: Effort normal  No stridor  No respiratory distress  Abdominal: Soft  Bowel sounds are normal  She exhibits no distension  Musculoskeletal: Normal range of motion  She exhibits no edema  Neurological: She is alert  No cranial nerve deficit  Skin: Skin is warm  Additional Data:     Labs:    Results from last 7 days   Lab Units 11/04/19  0534   WBC Thousand/uL 4 24*   HEMOGLOBIN g/dL 12 1   HEMATOCRIT % 37 7   PLATELETS Thousands/uL 209   NEUTROS PCT % 55   LYMPHS PCT % 39   MONOS PCT % 5   EOS PCT % 0     Results from last 7 days   Lab Units 11/04/19  0534 11/03/19  0203   POTASSIUM mmol/L 5 1 4 5   CHLORIDE mmol/L 109* 99*   CO2 mmol/L 25 30   BUN mg/dL 23 20   CREATININE mg/dL 1 05 1 08   CALCIUM mg/dL 8 7 8 8   ALK PHOS U/L  --  122*   ALT U/L  --  14   AST U/L  --  12     Results from last 7 days   Lab Units 11/03/19  0203   INR  0 99       * I Have Reviewed All Lab Data Listed Above  * Additional Pertinent Lab Tests Reviewed: Luis Felipe 66 Admission Reviewed    Imaging:    Imaging Reports Reviewed Today Include:   Imaging Personally Reviewed by Myself Includes:      Recent Cultures (last 7 days):     Results from last 7 days   Lab Units 11/03/19  1532 11/03/19  1042 11/03/19  0203   BLOOD CULTURE   --  No Growth at 24 hrs    No Growth at 24 hrs   --    GRAM STAIN RESULT  1+ Polys  No bacteria seen  --  No Polys or Bacteria seen   WOUND CULTURE   --   --  4+ Growth of Staphylococcus species*   BODY FLUID CULTURE, STERILE  Culture too young- will reincubate  --   --        Last 24 Hours Medication List:     Current Facility-Administered Medications:  acetaminophen 650 mg Oral Q6H PRN Rajwinder Mcadams MD    bacitracin  Right Eye BID Rajwinder Mcadams MD    clindamycin 600 mg Intravenous Q8H Rajwinder Mcadams MD Last Rate: 600 mg (11/04/19 1408)   dextran 70-hypromellose 1 drop Right Eye Q2H While awake Shwetha Christensen MD    DULoxetine 60 mg Oral HS Rajwinder Mcadams MD    enoxaparin 40 mg Subcutaneous Daily Rajwinder Mcadams MD    metoprolol tartrate 50 mg Oral HS Rajwinder Mcadams MD    mirtazapine 15 mg Oral HS Rajwinder Mcadams MD    ondansetron 4 mg Intravenous Q6H PRN Shwetha Christensen MD    oxymetazoline 2 spray Each Nare Q12H PRN Brenda Richard MD    sodium chloride 2 spray Each Nare Q4H While Awake Brenda Richard MD    vancomycin 20 mg/kg (Adjusted) Intravenous Q24H Robbie Valencia MD Last Rate: Stopped (11/04/19 0417)        Today, Patient Was Seen By: David Sims MD    ** Please Note: Dictation voice to text software may have been used in the creation of this document   **

## 2019-11-04 NOTE — PLAN OF CARE
Problem: PHYSICAL THERAPY ADULT  Goal: Performs mobility at highest level of function for planned discharge setting  See evaluation for individualized goals  Description  Treatment/Interventions: Functional transfer training, LE strengthening/ROM, Endurance training, Therapeutic exercise, Cognitive reorientation, Patient/family training, Equipment eval/education, Bed mobility, Gait training, Spoke to nursing, OT  Equipment Recommended: Tisha Hand       See flowsheet documentation for full assessment, interventions and recommendations  Outcome: Progressing  Note:   Prognosis: Good  Problem List: Decreased strength, Decreased range of motion, Decreased endurance, Impaired balance, Decreased coordination, Decreased mobility, Decreased safety awareness, Decreased cognition, Impaired judgement  Assessment: Pt  is a 79 yo F who presents with Abscess of Periobital region R  order placed for PT eval and tx, w/ activity order of up w/ A  pt presents w/ comorbidities of  Htn, kyphosis, CHF, A-Fib, Lesion of lung, and dementia and personal factors of advanced age, inaccessible home environment, living in 2 story house, mobilizing w/ assistive device, stair(s) to enter home, inability to ambulate household distances, inability to navigate community distances, inability to navigate level surfaces w/o external assistance, unable to perform dynamic tasks in community, decreased cognition, limited home support, positive fall history, impulsivity, inability to perform current job functions, unable to perform caregiver support/tasks, unable to perform physical activity, inability to perform IADLs, inability to perform ADLs and inability to live alone  pt presents w/ weakness, decreased ROM, decreased endurance, impaired cognition, impaired balance, gait deviations, visual impairment, impaired coordination, decreased safety awareness, fall risk and LE edema   these impairments are evident in findings from physical examination (weakness, impaired coordination and edema of extremities), mobility assessment (need for ModA assist w/ all phases of mobility when usually mobilizing independently, tolerance to only 25 feet of ambulation and need for cueing for mobility technique), and Barthel Index: 25/100  pt needed input for task focus and mobility technique  pt is at risk for falls due to physical and safety awareness deficits  pt's clinical presentation is unstable/unpredictable (evident in need for assist w/ all phases of mobility when usually mobilizing independently, tolerance to only 25 feet of ambulation, pain impacting overall mobility status, need for input for mobility technique, need for input for task focus and mobility technique and need for input for mobility technique/safety)  pt needs inpatient PT tx to improve mobility deficits  discharge recommendation is for inpatient rehab to reduce fall risk and maximize level of functional independence  Recommendation: Post acute IP rehab     PT - OK to Discharge: Yes    See flowsheet documentation for full assessment

## 2019-11-04 NOTE — PHYSICAL THERAPY NOTE
PHYSICAL THERAPY Evaluation NOTE    Patient Name: Jonas Wolf  MZNDI'S Date: 11/4/2019     AGE:   80 y o  Mrn:   [id number]  ADMIT DX:  Orbital abscess [H05 019]  Abscess of periorbital region, right [E09 124]    Past Medical History:   Diagnosis Date    Alzheimer disease ([facility] [address] )     Anxiety     Hypertension      Length Of Stay: 1  PHYSICAL THERAPY EVALUATION :    11/04/19 1139   Note Type   Note type Eval only   Pain Assessment   Pain Assessment 0-10   Pain Score No Pain   Home Living   Type of Home House  (Per Pt  with Son in law and daughter )   Home Layout Stairs to enter with rails;Bed/bath upstairs   Bathroom Shower/Tub Tub/shower unit   Bathroom Toilet Standard   Bathroom Accessibility Accessible   Home Equipment Walker;Cane   Additional Comments Pt  questionable historian at this time  Prior Function   Level of [place] Needs assistance with IADLs   Lives With Family   Receives Help From Family   ADL Assistance Independent   IADLs Needs assistance   Falls in the last 6 months 0   Comments PTA, Pt  reports assistance with IADLs, INDEP with ADLs and functional mobility with RW or Cane   Restrictions/Precautions   Other Precautions Chair Alarm; Bed Alarm; Impulsive;Cognitive; Restraints;Multiple lines; Fall Risk;Pain   General   Additional Pertinent History Pt  is a 81 yo F who presents with Abscess of Periobital region R  Family/Caregiver Present No   Cognition   Overall Cognitive Status Impaired   Arousal/Participation Cooperative   Orientation Level Oriented to person;Disoriented to place; Disoriented to time;Disoriented to situation   Memory Decreased recall of recent events;Decreased short term memory   Following Commands Follows multistep commands with increased time or repetition   Comments Pt  was identified with full name and birthdate   RLE Assessment   RLE Assessment   (functionally >3+/5)   LLE Assessment   LLE Assessment   (functionally >3+/5)   Coordination   Movements are Fluid and Coordinated 0   Coordination and Movement Description gait ataxia    Sensation WFL   Light Touch   RLE Light Touch Grossly intact   LLE Light Touch Grossly intact   Bed Mobility   Supine to Sit 3  Moderate assistance   Additional items Assist x 1; Increased time required;LE management;Verbal cues   Sit to Supine 3  Moderate assistance   Additional items Assist x 1; Increased time required;LE management   Transfers   Sit to Stand 3  Moderate assistance   Additional items Assist x 2; Increased time required;Verbal cues  (for hand placement and sequencing)   Stand to Sit 3  Moderate assistance   Additional items Assist x 2;Impulsive;Verbal cues  (to reach back to control descent)   Ambulation/Elevation   Gait pattern Improper Weight shift;Narrow RADHA; Forward Flexion;Decreased foot clearance;Shuffling; Inconsistent [place]; Redundant gait at times; Short stride; Step to;Excessively slow   Gait Assistance 3  Moderate assist   Additional items Assist x 1;Verbal cues  (for AD managment and gait sequencing)   Assistive Device Rolling walker   Distance 25'x1   Balance   Static Sitting Fair +   Dynamic Sitting Fair   Static Standing Fair -   Dynamic Standing Fair -   Ambulatory Poor +   Endurance Deficit   Endurance Deficit Yes   Endurance Deficit Description postural and gait degradation noted with fatigue   Activity Tolerance   Activity Tolerance Patient limited by fatigue   Medical Staff Made Aware Spoke to Arianna Knott OT   Nurse Made Aware Spoke to RN   Assessment   Prognosis Good   Problem List Decreased strength;Decreased range of motion;Decreased endurance; Impaired balance;Decreased coordination;Decreased mobility; Decreased safety awareness;Decreased cognition; Impaired judgement   Assessment Pt  is a 81 yo F who presents with Abscess of Periobital region R  order placed for PT eval and tx, w/ activity order of up w/ A  pt presents w/ comorbidities of  Htn, kyphosis, CHF, A-Fib, Lesion of lung, and dementia and personal factors of advanced age, inaccessible home environment, living in 2 story house, mobilizing w/ assistive device, stair(s) to enter home, inability to ambulate household distances, inability to navigate community distances, inability to navigate level surfaces w/o external assistance, unable to perform dynamic tasks in community, decreased cognition, limited home support, positive fall history, impulsivity, inability to perform current job functions, unable to perform caregiver support/tasks, unable to perform physical activity, inability to perform IADLs, inability to perform ADLs and inability to live alone  pt presents w/ weakness, decreased ROM, decreased endurance, impaired cognition, impaired balance, gait deviations, visual impairment, impaired coordination, decreased safety awareness, fall risk and LE edema  these impairments are evident in findings from physical examination (weakness, impaired coordination and edema of extremities), mobility assessment (need for ModA assist w/ all phases of mobility when usually mobilizing independently, tolerance to only 25 feet of ambulation and need for cueing for mobility technique), and Barthel Index: 25/100  pt needed input for task focus and mobility technique  pt is at risk for falls due to physical and safety awareness deficits  pt's clinical presentation is unstable/unpredictable (evident in need for assist w/ all phases of mobility when usually mobilizing independently, tolerance to only 25 feet of ambulation, pain impacting overall mobility status, need for input for mobility technique, need for input for task focus and mobility technique and need for input for mobility technique/safety)  pt needs inpatient PT tx to improve mobility deficits  discharge recommendation is for inpatient rehab to reduce fall risk and maximize level of functional independence      Goals   Patient Goals to get home   STG Expiration Date 11/14/19   Short Term Goal #1 pt will: Increase bilateral LE strength 1/2 grade to facilitate independent mobility, Perform all bed mobility tasks modified independent to decrease fall risk factors, Perform all transfers w/ supervision to improve independence, Ambulate 150 ft  with least restrictive assistive device w/ supervision w/o LOB, Increase all balance 1/2 grade to decrease risk for falls and Improve Barthel Index score to 50 or greater to facilitate independence   PT Treatment Day 0   Plan   Treatment/Interventions Functional transfer training;LE strengthening/ROM; Endurance training; Therapeutic exercise;Cognitive reorientation;Patient/family training;Equipment eval/education; Bed mobility;Gait training;Spoke to nursing;OT   PT Frequency   (3-5x/week)   Recommendation   Recommendation Post acute IP rehab   Equipment Recommended Walker   PT - OK to Discharge Yes   Additional Comments to rehab when medically appropriate   Barthel Index   Feeding 5   Bathing 0   Grooming Score 0   Dressing Score 5   Bladder Score 0   Bowels Score 5   Toilet Use Score 5   Transfers (Bed/Chair) Score 5   Mobility (Level Surface) Score 0   Stairs Score 0   Barthel Index Score 25     Skilled PT recommended while in hospital and upon DC to progress pt toward treatment goals       Ani Frost, PT, DPT 11/4/2019

## 2019-11-05 LAB
ANION GAP SERPL CALCULATED.3IONS-SCNC: 7 MMOL/L (ref 4–13)
BACTERIA SPEC ANAEROBE CULT: NORMAL
BACTERIA WND AEROBE CULT: ABNORMAL
BASOPHILS # BLD AUTO: 0.01 THOUSANDS/ΜL (ref 0–0.1)
BASOPHILS NFR BLD AUTO: 0 % (ref 0–1)
BUN SERPL-MCNC: 27 MG/DL (ref 5–25)
CALCIUM SERPL-MCNC: 8.3 MG/DL (ref 8.3–10.1)
CHLORIDE SERPL-SCNC: 111 MMOL/L (ref 100–108)
CO2 SERPL-SCNC: 24 MMOL/L (ref 21–32)
CREAT SERPL-MCNC: 1.03 MG/DL (ref 0.6–1.3)
EOSINOPHIL # BLD AUTO: 0 THOUSAND/ΜL (ref 0–0.61)
EOSINOPHIL NFR BLD AUTO: 0 % (ref 0–6)
ERYTHROCYTE [DISTWIDTH] IN BLOOD BY AUTOMATED COUNT: 13.4 % (ref 11.6–15.1)
GFR SERPL CREATININE-BSD FRML MDRD: 47 ML/MIN/1.73SQ M
GLUCOSE SERPL-MCNC: 101 MG/DL (ref 65–140)
GRAM STN SPEC: ABNORMAL
HCT VFR BLD AUTO: 35.7 % (ref 34.8–46.1)
HGB BLD-MCNC: 11.3 G/DL (ref 11.5–15.4)
IMM GRANULOCYTES # BLD AUTO: 0.02 THOUSAND/UL (ref 0–0.2)
IMM GRANULOCYTES NFR BLD AUTO: 0 % (ref 0–2)
LYMPHOCYTES # BLD AUTO: 3.53 THOUSANDS/ΜL (ref 0.6–4.47)
LYMPHOCYTES NFR BLD AUTO: 47 % (ref 14–44)
MCH RBC QN AUTO: 33.5 PG (ref 26.8–34.3)
MCHC RBC AUTO-ENTMCNC: 31.7 G/DL (ref 31.4–37.4)
MCV RBC AUTO: 106 FL (ref 82–98)
MONOCYTES # BLD AUTO: 0.37 THOUSAND/ΜL (ref 0.17–1.22)
MONOCYTES NFR BLD AUTO: 5 % (ref 4–12)
NEUTROPHILS # BLD AUTO: 3.57 THOUSANDS/ΜL (ref 1.85–7.62)
NEUTS SEG NFR BLD AUTO: 48 % (ref 43–75)
NRBC BLD AUTO-RTO: 0 /100 WBCS
PLATELET # BLD AUTO: 230 THOUSANDS/UL (ref 149–390)
PMV BLD AUTO: 10.8 FL (ref 8.9–12.7)
POTASSIUM SERPL-SCNC: 3.9 MMOL/L (ref 3.5–5.3)
RBC # BLD AUTO: 3.37 MILLION/UL (ref 3.81–5.12)
SODIUM SERPL-SCNC: 142 MMOL/L (ref 136–145)
WBC # BLD AUTO: 7.5 THOUSAND/UL (ref 4.31–10.16)

## 2019-11-05 PROCEDURE — 99233 SBSQ HOSP IP/OBS HIGH 50: CPT | Performed by: INTERNAL MEDICINE

## 2019-11-05 PROCEDURE — 85025 COMPLETE CBC W/AUTO DIFF WBC: CPT | Performed by: FAMILY MEDICINE

## 2019-11-05 PROCEDURE — 99232 SBSQ HOSP IP/OBS MODERATE 35: CPT | Performed by: FAMILY MEDICINE

## 2019-11-05 PROCEDURE — 80048 BASIC METABOLIC PNL TOTAL CA: CPT | Performed by: FAMILY MEDICINE

## 2019-11-05 RX ADMIN — Medication 2 SPRAY: at 06:16

## 2019-11-05 RX ADMIN — DULOXETINE HYDROCHLORIDE 60 MG: 60 CAPSULE, DELAYED RELEASE ORAL at 21:24

## 2019-11-05 RX ADMIN — CLINDAMYCIN PHOSPHATE 600 MG: 600 INJECTION, SOLUTION INTRAVENOUS at 21:19

## 2019-11-05 RX ADMIN — Medication 2 SPRAY: at 14:45

## 2019-11-05 RX ADMIN — Medication 2 SPRAY: at 17:15

## 2019-11-05 RX ADMIN — DEXTRAN 70 AND HYPROMELLOSE 2910 1 DROP: 1; 3 SOLUTION/ DROPS OPHTHALMIC at 14:45

## 2019-11-05 RX ADMIN — METOPROLOL TARTRATE 50 MG: 50 TABLET, FILM COATED ORAL at 21:24

## 2019-11-05 RX ADMIN — VANCOMYCIN HYDROCHLORIDE 1250 MG: 10 INJECTION, POWDER, LYOPHILIZED, FOR SOLUTION INTRAVENOUS at 02:39

## 2019-11-05 RX ADMIN — DEXTRAN 70 AND HYPROMELLOSE 2910 1 DROP: 1; 3 SOLUTION/ DROPS OPHTHALMIC at 07:57

## 2019-11-05 RX ADMIN — DEXTRAN 70 AND HYPROMELLOSE 2910 1 DROP: 1; 3 SOLUTION/ DROPS OPHTHALMIC at 17:15

## 2019-11-05 RX ADMIN — DEXTRAN 70 AND HYPROMELLOSE 2910 1 DROP: 1; 3 SOLUTION/ DROPS OPHTHALMIC at 21:19

## 2019-11-05 RX ADMIN — MIRTAZAPINE 15 MG: 15 TABLET, FILM COATED ORAL at 21:24

## 2019-11-05 RX ADMIN — BACITRACIN: 500 OINTMENT OPHTHALMIC at 08:00

## 2019-11-05 RX ADMIN — ONDANSETRON 4 MG: 2 INJECTION INTRAMUSCULAR; INTRAVENOUS at 14:52

## 2019-11-05 RX ADMIN — DEXTRAN 70 AND HYPROMELLOSE 2910 1 DROP: 1; 3 SOLUTION/ DROPS OPHTHALMIC at 12:34

## 2019-11-05 RX ADMIN — BACITRACIN: 500 OINTMENT OPHTHALMIC at 17:15

## 2019-11-05 RX ADMIN — Medication 2 SPRAY: at 10:12

## 2019-11-05 RX ADMIN — DEXTRAN 70 AND HYPROMELLOSE 2910 1 DROP: 1; 3 SOLUTION/ DROPS OPHTHALMIC at 06:15

## 2019-11-05 RX ADMIN — Medication 2 SPRAY: at 21:19

## 2019-11-05 RX ADMIN — ENOXAPARIN SODIUM 40 MG: 40 INJECTION SUBCUTANEOUS at 08:00

## 2019-11-05 RX ADMIN — DEXTRAN 70 AND HYPROMELLOSE 2910 1 DROP: 1; 3 SOLUTION/ DROPS OPHTHALMIC at 10:12

## 2019-11-05 RX ADMIN — ACETAMINOPHEN 650 MG: 325 TABLET ORAL at 14:53

## 2019-11-05 RX ADMIN — CLINDAMYCIN PHOSPHATE 600 MG: 600 INJECTION, SOLUTION INTRAVENOUS at 05:03

## 2019-11-05 RX ADMIN — CLINDAMYCIN PHOSPHATE 600 MG: 600 INJECTION, SOLUTION INTRAVENOUS at 14:45

## 2019-11-05 NOTE — PROGRESS NOTES
Progress Note - Infectious Disease   Kye Jackson 80 y o  female MRN: [id number]  Unit/Bed#: [facility] 830-01 Encounter: [id number]      IMPRESSION & RECOMMENDATIONS:   Impression/Recommendations:  1  Severe right preseptal cellulitis with abscess within nasolacrimal duct   CT negative for postseptal extension   ENT evaluation noted   Etiology is likely right dacryocystitis with obstruction   Also consideration for ethmoid sinusitis as there is some ethmoid cell opacification   Patient does have history of MRSA positive nasal culture   Suspect Staph aureus infection but cannot yet exclude other organisms, including anaerobes  Patient is now status post I and D of periorbital abscess and right ethmoidectomy on 11/03  Initial culture showed MRSA, which is sensitive to clindamycin  Operative culture also shows Staph aureus  Anaerobic culture is pending  Blood cultures are negative  Cellulitis is much improved on exam today      -discontinue vancomycin  -continue IV clindamycin for now pending final operative cultures  -close outpatient ENT follow-up  -continue serial exams  -monitor temperatures and hemodynamics  -monitor CBC  -if continues to improve, plan to treat with 10 days of antibiotics total, through 11/12      2  MRSA positive nares culture   Patient has history of MRSA colonization  Will continue antibiotic coverage as stated above      3  Alzheimer's dementia  Leyda Paul is awake and alert   Mentation is limited      4  Chronic CHF   No clinical evidence of acute exacerbation      Antibiotics:  Vancomycin/clindamycin 3  POD 2     I discussed above plan with patient, and previously with Dr Madelin Andersen from primary service  Subjective:  Patient is resting comfortably  She remains a poor historian  Currently denies any pain  She is not having fevers  No drainage from the eye        Objective:  Vitals:  Temp:  [97 2 °F (36 2 °C)-97 7 °F (36 5 °C)] 97 2 °F (36 2 °C)  HR:  [59-70] 61  Resp:  [12-18] 16  BP: (110-123)/(49-64) 123/61  SpO2:  [93 %-96 %] 94 %  Temp (24hrs), Av 5 °F (36 4 °C), Min:97 2 °F (36 2 °C), Max:97 7 °F (36 5 °C)  Current: Temperature: (!) 97 2 °F (36 2 °C)    Physical Exam:   General:  Awake, alert, pleasantly confused  Eyes:  Much improved periorbital erythema and edema  No active drainage  Oropharynx:  No ulcers, no lesions  Neck:  Supple, no lymphadenopathy  Lungs:  Clear to auscultation bilaterally, no accessory muscle use  Cardiac:  Regular rate and rhythm, no murmurs  Abdomen:  Soft, non-tender, non-distented  Extremities:  No peripheral cyanosis, clubbing, or edema  Skin:  No rashes, no ulcers  Neurological:  Moves all four extremities spontaneously    Lab Results:  I have personally reviewed pertinent labs  Results from last 7 days   Lab Units 19  0635 19  0534 19  0203   POTASSIUM mmol/L 3 9 5 1 4 5   CHLORIDE mmol/L 111* 109* 99*   CO2 mmol/L 24 25 30   BUN mg/dL 27* 23 20   CREATININE mg/dL 1 03 1 05 1 08   EGFR ml/min/1 73sq m 47 46 44   CALCIUM mg/dL 8 3 8 7 8 8   AST U/L  --   --  12   ALT U/L  --   --  14   ALK PHOS U/L  --   --  122*     Results from last 7 days   Lab Units 19  0635 19  0534 19  0203   WBC Thousand/uL 7 50 4 24* 9 10   HEMOGLOBIN g/dL 11 3* 12 1 12 9   PLATELETS Thousands/uL 230 209 215     Results from last 7 days   Lab Units 19  1532 19  1042 19  0203   BLOOD CULTURE   --  No Growth at 48 hrs  No Growth at 48 hrs   --    GRAM STAIN RESULT  1+ Polys  No bacteria seen  --  No Polys or Bacteria seen   WOUND CULTURE   --   --  4+ Growth of Methicillin Resistant Staphylococcus aureus*   BODY FLUID CULTURE, STERILE  2+ Growth of Staphylococcus aureus*  --   --        Imaging Studies:   I have personally reviewed pertinent imaging study reports and images in PACS  EKG, Pathology, and Other Studies:   I have personally reviewed pertinent reports

## 2019-11-05 NOTE — ASSESSMENT & PLAN NOTE
· CT facial bones: Evidence of right dacrocystitis  with associated preseptal soft tissue cellulitis and 1 2 cm abscess  · Clinically stable  · No evidence of sepsis  · S/p IV Vanco & clindamycin in ED -   · Ordered blood cultures  · Wound cultures sent at [place]  · Status post I&D yesterday  · Ophthalmology and ENT consult appreciated

## 2019-11-05 NOTE — PLAN OF CARE
Problem: Prexisting or High Potential for Compromised Skin Integrity  Goal: Skin integrity is maintained or improved  Description  INTERVENTIONS:  - Identify patients at risk for skin breakdown  - Assess and monitor skin integrity  - Assess and monitor nutrition and hydration status  - Monitor labs   - Assess for incontinence   - Turn and reposition patient  - Assist with mobility/ambulation  - Relieve pressure over bony prominences  - Avoid friction and shearing  - Provide appropriate hygiene as needed including keeping skin clean and dry  - Evaluate need for skin moisturizer/barrier cream  - Collaborate with interdisciplinary team   - Patient/family teaching  - Consider wound care consult   Outcome: Progressing     Problem: Potential for Falls  Goal: Patient will remain free of falls  Description  INTERVENTIONS:  - Assess patient frequently for physical needs  -  Identify cognitive and physical deficits and behaviors that affect risk of falls    -  [place] fall precautions as indicated by assessment   - Educate patient/family on patient safety including physical limitations  - Instruct patient to call for assistance with activity based on assessment  - Modify environment to reduce risk of injury  - Consider OT/PT consult to assist with strengthening/mobility  Outcome: Progressing     Problem: PAIN - ADULT  Goal: Verbalizes/displays adequate comfort level or baseline comfort level  Description  Interventions:  - Encourage patient to monitor pain and request assistance  - Assess pain using appropriate pain scale  - Administer analgesics based on type and severity of pain and evaluate response  - Implement non-pharmacological measures as appropriate and evaluate response  - Consider cultural and social influences on pain and pain management  - Notify physician/advanced practitioner if interventions unsuccessful or patient reports new pain  Outcome: Progressing     Problem: INFECTION - ADULT  Goal: Absence or prevention of progression during hospitalization  Description  INTERVENTIONS:  - Assess and monitor for signs and symptoms of infection  - Monitor lab/diagnostic results  - Monitor all insertion sites, i e  indwelling lines, tubes, and drains  - Monitor endotracheal if appropriate and nasal secretions for changes in amount and color  - [place] appropriate cooling/warming therapies per order  - Administer medications as ordered  - Instruct and encourage patient and family to use good hand hygiene technique  - Identify and instruct in appropriate isolation precautions for identified infection/condition  Outcome: Progressing     Problem: SAFETY ADULT  Goal: Maintain or return to baseline ADL function  Description  INTERVENTIONS:  -  Assess patient's ability to carry out ADLs; assess patient's baseline for ADL function and identify physical deficits which impact ability to perform ADLs (bathing, care of mouth/teeth, toileting, grooming, dressing, etc )  - Assess/evaluate cause of self-care deficits   - Assess range of motion  - Assess patient's mobility; develop plan if impaired  - Assess patient's need for assistive devices and provide as appropriate  - Encourage maximum independence but intervene and supervise when necessary  - Involve family in performance of ADLs  - Assess for home care needs following discharge   - Consider OT consult to assist with ADL evaluation and planning for discharge  - Provide patient education as appropriate  Outcome: Progressing  Goal: Maintain or return mobility status to optimal level  Description  INTERVENTIONS:  - Assess patient's baseline mobility status (ambulation, transfers, stairs, etc )    - Identify cognitive and physical deficits and behaviors that affect mobility  - Identify mobility aids required to assist with transfers and/or ambulation (gait belt, sit-to-stand, lift, walker, cane, etc )  - [place] fall precautions as indicated by assessment  - Record patient progress and toleration of activity level on Mobility SBAR; progress patient to next Phase/Stage  - Instruct patient to call for assistance with activity based on assessment  - Consider rehabilitation consult to assist with strengthening/weightbearing, etc   Outcome: Progressing     Problem: DISCHARGE PLANNING  Goal: Discharge to home or other facility with appropriate resources  Description  INTERVENTIONS:  - Identify barriers to discharge w/patient and caregiver  - Arrange for needed discharge resources and transportation as appropriate  - Identify discharge learning needs (meds, wound care, etc )  - Arrange for interpretive services to assist at discharge as needed  - Refer to Case Management Department for coordinating discharge planning if the patient needs post-hospital services based on physician/advanced practitioner order or complex needs related to functional status, cognitive ability, or social support system  Outcome: Progressing     Problem: Knowledge Deficit  Goal: Patient/family/caregiver demonstrates understanding of disease process, treatment plan, medications, and discharge instructions  Description  Complete learning assessment and assess knowledge base    Interventions:  - Provide teaching at level of understanding  - Provide teaching via preferred learning methods  Outcome: Progressing     Problem: SKIN/TISSUE INTEGRITY - ADULT  Goal: Skin integrity remains intact  Description  INTERVENTIONS  - Identify patients at risk for skin breakdown  - Assess and monitor skin integrity  - Assess and monitor nutrition and hydration status  - Monitor labs (i e  albumin)  - Assess for incontinence   - Turn and reposition patient  - Assist with mobility/ambulation  - Relieve pressure over bony prominences  - Avoid friction and shearing  - Provide appropriate hygiene as needed including keeping skin clean and dry  - Evaluate need for skin moisturizer/barrier cream  - Collaborate with interdisciplinary team (i e  Nutrition, Rehabilitation, etc )   - Patient/family teaching  Outcome: Progressing  Goal: Incision(s), wounds(s) or drain site(s) healing without S/S of infection  Description  INTERVENTIONS  - Assess and document risk factors for skin impairment   - Assess and document dressing, incision, wound bed, drain sites and surrounding tissue  - Consider nutrition services referral as needed  - Oral mucous membranes remain intact  - Provide patient/ family education  Outcome: Progressing     Problem: SAFETY,RESTRAINT: NV/NON-SELF DESTRUCTIVE BEHAVIOR  Goal: Remains free of harm/injury (restraint for non violent/non self-detsructive behavior)  Description  INTERVENTIONS:  - Instruct patient/family regarding restraint use   - Assess and monitor physiologic and psychological status   - Provide interventions and comfort measures to meet assessed patient needs   - Identify and implement measures to help patient regain control  - Assess readiness for release of restraint   Outcome: Progressing  Goal: Returns to optimal restraint-free functioning  Description  INTERVENTIONS:  - Assess the patient's behavior and symptoms that indicate continued need for restraint  - Identify and implement measures to help patient regain control  - Assess readiness for release of restraint   Outcome: Progressing     Problem: Nutrition/Hydration-ADULT  Goal: Nutrient/Hydration intake appropriate for improving, restoring or maintaining nutritional needs  Description  Monitor and assess patient's nutrition/hydration status for malnutrition  Collaborate with interdisciplinary team and initiate plan and interventions as ordered  Monitor patient's weight and dietary intake as ordered or per policy  Utilize nutrition screening tool and intervene as necessary  Determine patient's food preferences and provide high-protein, high-caloric foods as appropriate       INTERVENTIONS:  - Monitor oral intake, urinary output, labs, and treatment plans  - Assess nutrition and hydration status and recommend course of action  - Evaluate amount of meals eaten  - Assist patient with eating if necessary   - Allow adequate time for meals  - Recommend/ encourage appropriate diets, oral nutritional supplements, and vitamin/mineral supplements  - Order, calculate, and assess calorie counts as needed  - Recommend, monitor, and adjust tube feedings and TPN/PPN based on assessed needs  - Assess need for intravenous fluids  - Provide specific nutrition/hydration education as appropriate  - Include patient/family/caregiver in decisions related to nutrition  Outcome: Progressing

## 2019-11-05 NOTE — ASSESSMENT & PLAN NOTE
· Patient with significant baseline dementia according being to the daughter-in-law hold the POA    Patient is confused for more time then being oriented  · Is with son and daughter-in-law daughter-in-law is the primary caregiver  · Continue home Cymbalta 60 mg HS and Remeron 15 mg HS  · Monitor for delirium and confusion

## 2019-11-05 NOTE — PROGRESS NOTES
Vancomycin IV Pharmacy-to-Dose Consultation    Dyan Dale is a 80 y o  female who is currently receiving Vancomycin IV with management by the Pharmacy Consult service  Assessment/Plan:  The patient was reviewed  Renal function is stable and no signs or symptoms of nephrotoxicity and/or infusion reactions were documented in the chart  Based on todays assessment, continue current vancomycin (day # 3) dosing of 1250mg iv q24h, with a plan for trough to be drawn at 0230 on 11/6  We will continue to follow the patients culture results and clinical progress daily      Blaze Kapoor, Pharmacist

## 2019-11-05 NOTE — PROGRESS NOTES
Progress Note - Raghav Mitchell 12/1/1925, 80 y o  female MRN: [id number]    Unit/Bed#: [facility] 830-01 Encounter: [id number]    Primary Care Provider: Angela Hagen MD   Date and time admitted to hospital: 11/3/2019 10:05 AM        * Abscess of periorbital region, right  Assessment & Plan  · CT facial bones: Evidence of right dacrocystitis  with associated preseptal soft tissue cellulitis and 1 2 cm abscess  · Clinically stable  · No evidence of sepsis  · S/p IV Vanco & clindamycin in ED -   · Ordered blood cultures  · Wound cultures sent at [place]  · Status post I&D yesterday  · Ophthalmology and ENT consult appreciated    Dementia [facility])  Assessment & Plan  · Patient with significant baseline dementia according being to the daughter-in-law hold the POA    Patient is confused for more time then being oriented  · Is with son and daughter-in-law daughter-in-law is the primary caregiver  · Continue home Cymbalta 60 mg HS and Remeron 15 mg HS  · Monitor for delirium and confusion      Lesion of lung  Assessment & Plan  · H/o LLL lesion, couldn't be biopsed  · Has disappeared with time per daughter in law    MRSA exposure  Assessment & Plan  · H/o MRSA infection in past  · Needing 6 week IV Vanco  · They couldn't do MRI of spine  · They suspected it was in spine    A-fib [facility])  Assessment & Plan  · Currently controlled on current regimen  · Cont metoprolol tartarate 50 mg HS  · Not been on [facility]    Chronic congestive heart failure (HCC)  Assessment & Plan  Wt Readings from Last 3 Encounters:   11/05/19 79 4 kg (175 lb 0 7 oz)   11/03/19 85 3 kg (188 lb)   11/03/19 85 3 kg (188 lb 0 8 oz)       · H/o volume overload in past  · No echo available to determine systolic or diastolic  · On lasix 40 BID at home with PO potassium  · hold them both for now  · Has doctors primarily at [facility]      Hypertension  Assessment & Plan  · Cont home metoprolol tartarate 50 mg HS  · Lasix on hold for now      VTE Pharmacologic Prophylaxis: 2  Pharmacologic: Enoxaparin (Lovenox)  Mechanical VTE Prophylaxis in Place: Yes    Patient Centered Rounds: I have performed bedside rounds with nursing staff today  Discussions with Specialists or Other Care Team Provider:       Education and Discussions with Family / Patient: patient and daughter in law    Time Spent for Care: 30 minutes  More than 50% of total time spent on counseling and coordination of care as described above  Current Length of Stay: 2 day(s)    Current Patient Status: Inpatient   Certification Statement: The patient will continue to require additional inpatient hospital stay due to iv abx    Discharge Plan:     Code Status: Level 3 - DNAR and DNI      Subjective:   Patient seen and examined, sleeping, but arousable, vancomycin discontinued by infectious disease    Objective:     Vitals:   Temp (24hrs), Av 6 °F (36 4 °C), Min:97 2 °F (36 2 °C), Max:97 9 °F (36 6 °C)    Temp:  [97 2 °F (36 2 °C)-97 9 °F (36 6 °C)] 97 9 °F (36 6 °C)  HR:  [59-71] 71  Resp:  [12-18] 18  BP: (110-123)/(49-61) 120/55  SpO2:  [94 %-96 %] 94 %  Body mass index is 29 13 kg/m²  Input and Output Summary (last 24 hours): Intake/Output Summary (Last 24 hours) at 2019 1751  Last data filed at 2019 1351  Gross per 24 hour   Intake 590 05 ml   Output 377 ml   Net 213 05 ml       Physical Exam:     Physical Exam   HENT:   Right peroorbital erythema improved, swelling improving   Neck: No JVD present  Pulmonary/Chest: No respiratory distress  Abdominal: Soft  Musculoskeletal: Normal range of motion  Neurological:   Sleeping easily arousable   Skin: Skin is warm         Additional Data:     Labs:    Results from last 7 days   Lab Units 19  0635   WBC Thousand/uL 7 50   HEMOGLOBIN g/dL 11 3*   HEMATOCRIT % 35 7   PLATELETS Thousands/uL 230   NEUTROS PCT % 48   LYMPHS PCT % 47*   MONOS PCT % 5   EOS PCT % 0     Results from last 7 days   Lab Units 19  6161 11/03/19  0203   POTASSIUM mmol/L 3 9   < > 4 5   CHLORIDE mmol/L 111*   < > 99*   CO2 mmol/L 24   < > 30   BUN mg/dL 27*   < > 20   CREATININE mg/dL 1 03   < > 1 08   CALCIUM mg/dL 8 3   < > 8 8   ALK PHOS U/L  --   --  122*   ALT U/L  --   --  14   AST U/L  --   --  12    < > = values in this interval not displayed  Results from last 7 days   Lab Units 11/03/19  0203   INR  0 99       * I Have Reviewed All Lab Data Listed Above  * Additional Pertinent Lab Tests Reviewed: [address] Admission Reviewed    Imaging:    Imaging Reports Reviewed Today Include:   Imaging Personally Reviewed by Myself Includes:      Recent Cultures (last 7 days):     Results from last 7 days   Lab Units 11/03/19  1532 11/03/19  1042 11/03/19  0203   BLOOD CULTURE   --  No Growth at 48 hrs    No Growth at 48 hrs   --    GRAM STAIN RESULT  1+ Polys  No bacteria seen  --  No Polys or Bacteria seen   WOUND CULTURE   --   --  4+ Growth of Methicillin Resistant Staphylococcus aureus*   BODY FLUID CULTURE, STERILE  2+ Growth of Staphylococcus aureus*  --   --        Last 24 Hours Medication List:     Current Facility-Administered Medications:  acetaminophen 650 mg Oral Q6H PRN Rajwinder Mcadams MD    bacitracin  Right Eye BID Rajwinder Mcadams MD    clindamycin 600 mg Intravenous Q8H Rajwinder Mcadams MD Last Rate: 600 mg (11/05/19 1445)   dextran 70-hypromellose 1 drop Right Eye Q2H While awake Mariel Velasco MD    DULoxetine 60 mg Oral HS Rajwinder Mcadams MD    enoxaparin 40 mg Subcutaneous Daily Rajwinder Mcadams MD    metoprolol tartrate 50 mg Oral HS Rajwinder Mcadams MD    mirtazapine 15 mg Oral HS Rajwinder Mcadams MD    ondansetron 4 mg Intravenous Q6H PRN Mariel Velasco MD    oxymetazoline 2 spray Each Nare Q12H PRN Joseph Danielle MD    sodium chloride 2 spray Each Nare Q4H While Awake Joseph Danielle MD         Today, Patient Was Seen By: Kobi Green MD    ** Please Note: Dictation voice to text software may have been used in the creation of this document   **

## 2019-11-05 NOTE — PLAN OF CARE
Problem: Prexisting or High Potential for Compromised Skin Integrity  Goal: Skin integrity is maintained or improved  Description  INTERVENTIONS:  - Identify patients at risk for skin breakdown  - Assess and monitor skin integrity  - Assess and monitor nutrition and hydration status  - Monitor labs   - Assess for incontinence   - Turn and reposition patient  - Assist with mobility/ambulation  - Relieve pressure over bony prominences  - Avoid friction and shearing  - Provide appropriate hygiene as needed including keeping skin clean and dry  - Evaluate need for skin moisturizer/barrier cream  - Collaborate with interdisciplinary team   - Patient/family teaching  - Consider wound care consult   Outcome: Progressing     Problem: Potential for Falls  Goal: Patient will remain free of falls  Description  INTERVENTIONS:  - Assess patient frequently for physical needs  -  Identify cognitive and physical deficits and behaviors that affect risk of falls    -  [place] fall precautions as indicated by assessment   - Educate patient/family on patient safety including physical limitations  - Instruct patient to call for assistance with activity based on assessment  - Modify environment to reduce risk of injury  - Consider OT/PT consult to assist with strengthening/mobility  Outcome: Progressing     Problem: PAIN - ADULT  Goal: Verbalizes/displays adequate comfort level or baseline comfort level  Description  Interventions:  - Encourage patient to monitor pain and request assistance  - Assess pain using appropriate pain scale  - Administer analgesics based on type and severity of pain and evaluate response  - Implement non-pharmacological measures as appropriate and evaluate response  - Consider cultural and social influences on pain and pain management  - Notify physician/advanced practitioner if interventions unsuccessful or patient reports new pain  Outcome: Progressing     Problem: INFECTION - ADULT  Goal: Absence or prevention of progression during hospitalization  Description  INTERVENTIONS:  - Assess and monitor for signs and symptoms of infection  - Monitor lab/diagnostic results  - Monitor all insertion sites, i e  indwelling lines, tubes, and drains  - Monitor endotracheal if appropriate and nasal secretions for changes in amount and color  - [place] appropriate cooling/warming therapies per order  - Administer medications as ordered  - Instruct and encourage patient and family to use good hand hygiene technique  - Identify and instruct in appropriate isolation precautions for identified infection/condition  Outcome: Progressing     Problem: SAFETY ADULT  Goal: Maintain or return to baseline ADL function  Description  INTERVENTIONS:  -  Assess patient's ability to carry out ADLs; assess patient's baseline for ADL function and identify physical deficits which impact ability to perform ADLs (bathing, care of mouth/teeth, toileting, grooming, dressing, etc )  - Assess/evaluate cause of self-care deficits   - Assess range of motion  - Assess patient's mobility; develop plan if impaired  - Assess patient's need for assistive devices and provide as appropriate  - Encourage maximum independence but intervene and supervise when necessary  - Involve family in performance of ADLs  - Assess for home care needs following discharge   - Consider OT consult to assist with ADL evaluation and planning for discharge  - Provide patient education as appropriate  Outcome: Progressing  Goal: Maintain or return mobility status to optimal level  Description  INTERVENTIONS:  - Assess patient's baseline mobility status (ambulation, transfers, stairs, etc )    - Identify cognitive and physical deficits and behaviors that affect mobility  - Identify mobility aids required to assist with transfers and/or ambulation (gait belt, sit-to-stand, lift, walker, cane, etc )  - [place] fall precautions as indicated by assessment  - Record patient progress and toleration of activity level on Mobility SBAR; progress patient to next Phase/Stage  - Instruct patient to call for assistance with activity based on assessment  - Consider rehabilitation consult to assist with strengthening/weightbearing, etc   Outcome: Progressing     Problem: DISCHARGE PLANNING  Goal: Discharge to home or other facility with appropriate resources  Description  INTERVENTIONS:  - Identify barriers to discharge w/patient and caregiver  - Arrange for needed discharge resources and transportation as appropriate  - Identify discharge learning needs (meds, wound care, etc )  - Arrange for interpretive services to assist at discharge as needed  - Refer to Case Management Department for coordinating discharge planning if the patient needs post-hospital services based on physician/advanced practitioner order or complex needs related to functional status, cognitive ability, or social support system  Outcome: Progressing     Problem: Knowledge Deficit  Goal: Patient/family/caregiver demonstrates understanding of disease process, treatment plan, medications, and discharge instructions  Description  Complete learning assessment and assess knowledge base    Interventions:  - Provide teaching at level of understanding  - Provide teaching via preferred learning methods  Outcome: Progressing     Problem: SKIN/TISSUE INTEGRITY - ADULT  Goal: Skin integrity remains intact  Description  INTERVENTIONS  - Identify patients at risk for skin breakdown  - Assess and monitor skin integrity  - Assess and monitor nutrition and hydration status  - Monitor labs (i e  albumin)  - Assess for incontinence   - Turn and reposition patient  - Assist with mobility/ambulation  - Relieve pressure over bony prominences  - Avoid friction and shearing  - Provide appropriate hygiene as needed including keeping skin clean and dry  - Evaluate need for skin moisturizer/barrier cream  - Collaborate with interdisciplinary team (i e  Nutrition, Rehabilitation, etc )   - Patient/family teaching  Outcome: Progressing  Goal: Incision(s), wounds(s) or drain site(s) healing without S/S of infection  Description  INTERVENTIONS  - Assess and document risk factors for skin impairment   - Assess and document dressing, incision, wound bed, drain sites and surrounding tissue  - Consider nutrition services referral as needed  - Oral mucous membranes remain intact  - Provide patient/ family education  Outcome: Progressing     Problem: SAFETY,RESTRAINT: NV/NON-SELF DESTRUCTIVE BEHAVIOR  Goal: Remains free of harm/injury (restraint for non violent/non self-detsructive behavior)  Description  INTERVENTIONS:  - Instruct patient/family regarding restraint use   - Assess and monitor physiologic and psychological status   - Provide interventions and comfort measures to meet assessed patient needs   - Identify and implement measures to help patient regain control  - Assess readiness for release of restraint   Outcome: Progressing  Goal: Returns to optimal restraint-free functioning  Description  INTERVENTIONS:  - Assess the patient's behavior and symptoms that indicate continued need for restraint  - Identify and implement measures to help patient regain control  - Assess readiness for release of restraint   Outcome: Progressing     Problem: Nutrition/Hydration-ADULT  Goal: Nutrient/Hydration intake appropriate for improving, restoring or maintaining nutritional needs  Description  Monitor and assess patient's nutrition/hydration status for malnutrition  Collaborate with interdisciplinary team and initiate plan and interventions as ordered  Monitor patient's weight and dietary intake as ordered or per policy  Utilize nutrition screening tool and intervene as necessary  Determine patient's food preferences and provide high-protein, high-caloric foods as appropriate       INTERVENTIONS:  - Monitor oral intake, urinary output, labs, and treatment plans  - Assess nutrition and hydration status and recommend course of action  - Evaluate amount of meals eaten  - Assist patient with eating if necessary   - Allow adequate time for meals  - Recommend/ encourage appropriate diets, oral nutritional supplements, and vitamin/mineral supplements  - Order, calculate, and assess calorie counts as needed  - Recommend, monitor, and adjust tube feedings and TPN/PPN based on assessed needs  - Assess need for intravenous fluids  - Provide specific nutrition/hydration education as appropriate  - Include patient/family/caregiver in decisions related to nutrition  Outcome: Progressing     Problem: BEHAVIOR  Goal: Pt/Family maintain appropriate behavior and adhere to behavioral management agreement, if implemented  Description  INTERVENTIONS:  - Assess the family dynamic   - Encourage verbalization of thoughts and concerns in a socially appropriate manner  - Assess patient/family's coping skills and non-compliant behavior (including use of illegal substances)  - Utilize positive, consistent limit setting strategies supporting safety of patient, staff and others  - Initiate consult with Case Management, Spiritual Care or other ancillary services as appropriate  - If a patient's/visitor's behavior jeopardizes the safety of the patient, staff, or others, refer to organization procedure     - Notify Security of behavior or suspected illegal substances which indicate the need for search of the patient and/or belongings  - Encourage participation in the decision making process about a behavioral management agreement; implement if patient meets criteria  Outcome: Progressing

## 2019-11-05 NOTE — CONSULTS
Mireya Bangura is a 80 y o  female who was receiving Vancomycin IV with management by the Pharmacy Consult service for treatment of skin-soft tissue infection    The patient's Vancomycin therapy has been completed / discontinued  Thank you for allowing us to take part in this patient's care  Pharmacy will sign-off now; please call or re-consult if there are any questions  Chao Stoddard, PharmD   [address] [facility]

## 2019-11-05 NOTE — PROGRESS NOTES
Follow-up, ophthalmology  The patient is status post I and D of lacrimal sac  She is resting comfortably  Visual acuity- unable secondary to dementia  External examination, the right upper and lower lids are slightly edematous, greatly improved from prior examination there is also a reduction in edema particularly over the nasolacrimal sac which has a small amount of ecchymosis  The right lower lid is slightly exotropic  There is mild injection of the right conjunctiva with a clear cornea and formed anterior chamber  Impression:  Preseptal cellulitis with abscess status post drainage, doing well  Plan:  Observation  Will sign off  Please re-consult p r n  for ocular signs or symptoms

## 2019-11-05 NOTE — ASSESSMENT & PLAN NOTE
· Currently controlled on current regimen  · Cont metoprolol tartarate 50 mg HS  · Not been on [facility]

## 2019-11-05 NOTE — ASSESSMENT & PLAN NOTE
Wt Readings from Last 3 Encounters:   11/05/19 79 4 kg (175 lb 0 7 oz)   11/03/19 85 3 kg (188 lb)   11/03/19 85 3 kg (188 lb 0 8 oz)       · H/o volume overload in past  · No echo available to determine systolic or diastolic  · On lasix 40 BID at home with PO potassium  · hold them both for now  · Has doctors primarily at [facility]

## 2019-11-06 VITALS
HEIGHT: 65 IN | OXYGEN SATURATION: 95 % | SYSTOLIC BLOOD PRESSURE: 153 MMHG | DIASTOLIC BLOOD PRESSURE: 70 MMHG | TEMPERATURE: 97.9 F | BODY MASS INDEX: 28.22 KG/M2 | HEART RATE: 66 BPM | WEIGHT: 169.4 LBS | RESPIRATION RATE: 16 BRPM

## 2019-11-06 LAB
ANION GAP SERPL CALCULATED.3IONS-SCNC: 9 MMOL/L (ref 4–13)
BACTERIA SPEC BFLD CULT: ABNORMAL
BASOPHILS # BLD AUTO: 0.02 THOUSANDS/ΜL (ref 0–0.1)
BASOPHILS NFR BLD AUTO: 0 % (ref 0–1)
BUN SERPL-MCNC: 20 MG/DL (ref 5–25)
CALCIUM SERPL-MCNC: 8.6 MG/DL (ref 8.3–10.1)
CHLORIDE SERPL-SCNC: 113 MMOL/L (ref 100–108)
CO2 SERPL-SCNC: 22 MMOL/L (ref 21–32)
CREAT SERPL-MCNC: 0.86 MG/DL (ref 0.6–1.3)
EOSINOPHIL # BLD AUTO: 0.1 THOUSAND/ΜL (ref 0–0.61)
EOSINOPHIL NFR BLD AUTO: 2 % (ref 0–6)
ERYTHROCYTE [DISTWIDTH] IN BLOOD BY AUTOMATED COUNT: 13.4 % (ref 11.6–15.1)
GFR SERPL CREATININE-BSD FRML MDRD: 58 ML/MIN/1.73SQ M
GLUCOSE SERPL-MCNC: 108 MG/DL (ref 65–140)
GRAM STN SPEC: ABNORMAL
GRAM STN SPEC: ABNORMAL
HCT VFR BLD AUTO: 37.6 % (ref 34.8–46.1)
HGB BLD-MCNC: 11.8 G/DL (ref 11.5–15.4)
IMM GRANULOCYTES # BLD AUTO: 0.02 THOUSAND/UL (ref 0–0.2)
IMM GRANULOCYTES NFR BLD AUTO: 0 % (ref 0–2)
LYMPHOCYTES # BLD AUTO: 1.64 THOUSANDS/ΜL (ref 0.6–4.47)
LYMPHOCYTES NFR BLD AUTO: 32 % (ref 14–44)
MCH RBC QN AUTO: 33.3 PG (ref 26.8–34.3)
MCHC RBC AUTO-ENTMCNC: 31.4 G/DL (ref 31.4–37.4)
MCV RBC AUTO: 106 FL (ref 82–98)
MONOCYTES # BLD AUTO: 0.29 THOUSAND/ΜL (ref 0.17–1.22)
MONOCYTES NFR BLD AUTO: 6 % (ref 4–12)
MRSA NOSE QL CULT: ABNORMAL
NEUTROPHILS # BLD AUTO: 3.07 THOUSANDS/ΜL (ref 1.85–7.62)
NEUTS SEG NFR BLD AUTO: 60 % (ref 43–75)
NRBC BLD AUTO-RTO: 0 /100 WBCS
PLATELET # BLD AUTO: 231 THOUSANDS/UL (ref 149–390)
PMV BLD AUTO: 10.7 FL (ref 8.9–12.7)
POTASSIUM SERPL-SCNC: 4.1 MMOL/L (ref 3.5–5.3)
RBC # BLD AUTO: 3.54 MILLION/UL (ref 3.81–5.12)
SODIUM SERPL-SCNC: 144 MMOL/L (ref 136–145)
WBC # BLD AUTO: 5.14 THOUSAND/UL (ref 4.31–10.16)

## 2019-11-06 PROCEDURE — 85025 COMPLETE CBC W/AUTO DIFF WBC: CPT | Performed by: FAMILY MEDICINE

## 2019-11-06 PROCEDURE — 99233 SBSQ HOSP IP/OBS HIGH 50: CPT | Performed by: INTERNAL MEDICINE

## 2019-11-06 PROCEDURE — 99239 HOSP IP/OBS DSCHRG MGMT >30: CPT | Performed by: FAMILY MEDICINE

## 2019-11-06 PROCEDURE — 80048 BASIC METABOLIC PNL TOTAL CA: CPT | Performed by: FAMILY MEDICINE

## 2019-11-06 RX ORDER — DOXYCYCLINE HYCLATE 100 MG/1
100 CAPSULE ORAL EVERY 12 HOURS SCHEDULED
Status: DISCONTINUED | OUTPATIENT
Start: 2019-11-06 | End: 2019-11-06 | Stop reason: HOSPADM

## 2019-11-06 RX ORDER — BACITRACIN 500 [USP'U]/G
OINTMENT OPHTHALMIC 2 TIMES DAILY
Qty: 3.5 G | Refills: 0 | Status: SHIPPED | OUTPATIENT
Start: 2019-11-06 | End: 2021-07-12 | Stop reason: HOSPADM

## 2019-11-06 RX ORDER — ECHINACEA PURPUREA EXTRACT 125 MG
2 TABLET ORAL
Refills: 0
Start: 2019-11-06 | End: 2021-07-05

## 2019-11-06 RX ORDER — DOXYCYCLINE HYCLATE 100 MG/1
100 CAPSULE ORAL EVERY 12 HOURS SCHEDULED
Qty: 12 CAPSULE | Refills: 0 | Status: SHIPPED | OUTPATIENT
Start: 2019-11-06 | End: 2019-11-12

## 2019-11-06 RX ADMIN — DOXYCYCLINE 100 MG: 100 CAPSULE ORAL at 12:44

## 2019-11-06 RX ADMIN — BACITRACIN: 500 OINTMENT OPHTHALMIC at 10:39

## 2019-11-06 RX ADMIN — Medication 2 SPRAY: at 14:47

## 2019-11-06 RX ADMIN — DEXTRAN 70 AND HYPROMELLOSE 2910 1 DROP: 1; 3 SOLUTION/ DROPS OPHTHALMIC at 06:31

## 2019-11-06 RX ADMIN — DEXTRAN 70 AND HYPROMELLOSE 2910 1 DROP: 1; 3 SOLUTION/ DROPS OPHTHALMIC at 10:39

## 2019-11-06 RX ADMIN — Medication 2 SPRAY: at 10:39

## 2019-11-06 RX ADMIN — CLINDAMYCIN PHOSPHATE 600 MG: 600 INJECTION, SOLUTION INTRAVENOUS at 05:30

## 2019-11-06 RX ADMIN — DEXTRAN 70 AND HYPROMELLOSE 2910 1 DROP: 1; 3 SOLUTION/ DROPS OPHTHALMIC at 12:44

## 2019-11-06 RX ADMIN — DEXTRAN 70 AND HYPROMELLOSE 2910 1 DROP: 1; 3 SOLUTION/ DROPS OPHTHALMIC at 08:56

## 2019-11-06 RX ADMIN — Medication 2 SPRAY: at 06:31

## 2019-11-06 RX ADMIN — DEXTRAN 70 AND HYPROMELLOSE 2910 1 DROP: 1; 3 SOLUTION/ DROPS OPHTHALMIC at 14:47

## 2019-11-06 RX ADMIN — ENOXAPARIN SODIUM 40 MG: 40 INJECTION SUBCUTANEOUS at 08:56

## 2019-11-06 NOTE — TRANSPORTATION MEDICAL NECESSITY
Section I - General Information    Name of Patient: Shruthi Queen                 : 1925    Medicare #: [id number]  Transport Date: 19 (PCS is valid for round trips on this date and for all repetitive trips in the 60-day range as noted below )  Origin: [address] [facility] [address]                                                         Destination: [facility]  Is the pt's stay covered under Medicare Part A (PPS/DRG)   [x]     Closest appropriate facility? If no, why is transport to more distant facility required? Yes  If hospice pt, is this transport related to pt's terminal illness? NA       Section II - Medical Necessity Questionnaire  Ambulance transportation is medically necessary only if other means of transport are contraindicated or would be potentially harmful to the patient  To meet this requirement, the patient must either be "bed confined" or suffer from a condition such that transport by means other than ambulance is contraindicated by the patient's condition  The following questions must be answered by the medical professional signing below for this form to be valid:    1)  Describe the MEDICAL CONDITION (physical and/or mental) of this patient AT [address] [place] [address] that requires the patient to be transported in an ambulance and why transport by other means is contraindicated by the patient's condition:althzheimers dementia    2) Is the patient "bed confined" as defined below? No  To be "be confined" the patient must satisfy all three of the following conditions: (1) unable to get up from bed without Assistance; AND (2) unable to ambulate; AND (3) unable to sit in a chair or wheelchair  3) Can this patient safely be transported by car or wheelchair van (i e , seated during transport without a medical attendant or monitoring)?    No    4) In addition to completing questions 1-3 above, please check any of the following conditions that apply*:   *Note: supporting documentation for any boxes checked must be maintained in the patient's medical records  If hosp-hosp transfer, describe services needed at 2nd facility not available at 1st facility? Patient is confused  Danger to self/others  Special handling/isolation/infection control precautions required       Section III - Signature of Physician or Healthcare Professional  I certify that the above information is true and correct based on my evaluation of this patient, and represent that the patient requires transport by ambulance and that other forms of transport are contraindicated  I understand that this information will be used by the Centers for Medicare and Medicaid Services (CMS) to support the determination of medical necessity for ambulance services, and I represent that I have personal knowledge of the patient's condition at time of transport  []  If this box is checked, I also certify that the patient is physically or mentally incapable of signing the ambulance service's claim and that the institution with which I am affiliated has furnished care, services, or assistance to the patient  My signature below is made on behalf of the patient pursuant to 42 CFR §424 36(b)(4)  In accordance with 42 CFR §424 37, the specific reason(s) that the patient is physically or mentally incapable of signing the claim form is as follows: zehra dementia  Signature of Physician* or Healthcare Professional______________________________________________________________  Signature Date 11/06/19 (For scheduled repetitive transports, this form is not valid for transports performed more than 60 days after this date)    Printed Name & Credentials of Physician or Healthcare Professional (MD, DO, RN, etc )________________________________  *Form must be signed by patient's attending physician for scheduled, repetitive transports   For non-repetitive, unscheduled ambulance transports, if unable to obtain the signature of the attending physician, any of the following may sign (choose appropriate option below)  [] Physician Assistant []  Clinical Nurse Specialist [x]  Registered Nurse  []  Nurse Practitioner  [x] Discharge Planner

## 2019-11-06 NOTE — PROGRESS NOTES
POD3 s/p I&D right preseptal abscess, ant ethmoidectomy  Healing well    Vitals:    11/05/19 1438 11/05/19 2123 11/06/19 0042 11/06/19 0542   BP: 120/55 121/55 140/61    BP Location:       Pulse: 71 66 59    Resp: 18  16    Temp: 97 9 °F (36 6 °C)  (!) 97 2 °F (36 2 °C)    TempSrc:       SpO2: 94%  97%    Weight:    76 8 kg (169 lb 6 4 oz)   Height:         Wound cultures: +MRSA    NAD  Awake  Right periorbital erythema/edema improved  Crusting over right lacrimal sac, no purulence, mild tenderness  Some of the crusting was removed  Nose: dry blood in right nasal cavity    A/P: as above  Doing well   Some residual crusting around right lacrimal sac but no purulence visualized  -complete antibiotic regimen to cover MRSA as per ID  -continue abx ointment to wound over right lacrimal sac - may need to use mupirocin BID given MRSA  -half strength peroxide or gentle soap on qtip to cleanse the area daily would also be beneficial  -nasal saline spray/rinse to nares TID  -will need follow up with ENT early next week [facility]'S ENT office)

## 2019-11-06 NOTE — PROGRESS NOTES
Progress Note - Infectious Disease   Ila Bustos 80 y o  female MRN: [id number]  Unit/Bed#: [facility] 830-01 Encounter: [id number]      IMPRESSION & RECOMMENDATIONS:   Impression/Recommendations:  1  Severe right preseptal cellulitis with abscess within nasolacrimal duct   CT negative for postseptal extension   ENT evaluation noted   Etiology is likely right dacryocystitis with obstruction   Also consideration for ethmoid sinusitis as there is some ethmoid cell opacification   Patient does have history of MRSA positive nasal culture   Suspect Staph aureus infection but cannot yet exclude other organisms, including anaerobes   Patient is now status post I and D of periorbital abscess and right ethmoidectomy on   Initial culture and OR cultures show MRSA  Anaerobic culture is now negative  Blood cultures remain negative  Cellulitis has markedly improved post drainage of abscess      -discontinue clindamycin  -start oral doxycycline 100 mg b i d   -plan to treat for 10 days total post drainage, through    -close outpatient ENT follow-up next week  -continue serial exams  -monitor temperatures and hemodynamics  -monitor CBC     2  MRSA positive nares culture   Patient has history of MRSA colonization  Will continue antibiotic coverage as stated above      3  Alzheimer's dementia  dEd Lynch is awake and alert   Mentation is limited      4  Chronic CHF   No clinical evidence of acute exacerbation      Antibiotics:  clindamycin 4  POD 3     I discussed above plan with patient, and previously with Dr Licha Anderson from primary service  Subjective:  Patient is awake and alert today  Denies any pain  Not having any fevers  She was re-evaluated by ENT today with no ongoing drainage      Objective:  Vitals:  Temp:  [97 2 °F (36 2 °C)-97 9 °F (36 6 °C)] 97 9 °F (36 6 °C)  HR:  [59-71] 66  Resp:  [16-18] 16  BP: (120-153)/(55-70) 153/70  SpO2:  [94 %-97 %] 95 %  Temp (24hrs), Av 7 °F (36 5 °C), Min:97 2 °F (36 2 °C), Max:97 9 °F (36 6 °C)  Current: Temperature: 97 9 °F (36 6 °C)    Physical Exam:   General:  Awake, alert, pleasantly confused  Eyes:  Much improved periorbital erythema and edema  No active drainage  Oropharynx:  No ulcers, no lesions  Neck:  Supple, no lymphadenopathy  Lungs:  Clear to auscultation bilaterally, no accessory muscle use  Cardiac:  Regular rate and rhythm, no murmurs  Abdomen:  Soft, non-tender, non-distented  Extremities:  No peripheral cyanosis, clubbing, or edema  Skin:  No rashes, no ulcers  Neurological:  Moves all four extremities spontaneously    Lab Results:  I have personally reviewed pertinent labs  Results from last 7 days   Lab Units 11/06/19 0517 11/05/19  0635 11/04/19  0534 11/03/19  0203   POTASSIUM mmol/L 4 1 3 9 5 1 4 5   CHLORIDE mmol/L 113* 111* 109* 99*   CO2 mmol/L 22 24 25 30   BUN mg/dL 20 27* 23 20   CREATININE mg/dL 0 86 1 03 1 05 1 08   EGFR ml/min/1 73sq m 58 47 46 44   CALCIUM mg/dL 8 6 8 3 8 7 8 8   AST U/L  --   --   --  12   ALT U/L  --   --   --  14   ALK PHOS U/L  --   --   --  122*     Results from last 7 days   Lab Units 11/06/19 0517 11/05/19  0635 11/04/19  0534   WBC Thousand/uL 5 14 7 50 4 24*   HEMOGLOBIN g/dL 11 8 11 3* 12 1   PLATELETS Thousands/uL 231 230 209     Results from last 7 days   Lab Units 11/03/19  1819 11/03/19  1532 11/03/19  1042 11/03/19  0203   BLOOD CULTURE   --   --  No Growth at 72 hrs  No Growth at 72 hrs   --    GRAM STAIN RESULT   --  1+ Polys  No bacteria seen  --  No Polys or Bacteria seen   WOUND CULTURE   --   --   --  4+ Growth of Methicillin Resistant Staphylococcus aureus*   BODY FLUID CULTURE, STERILE   --  2+ Growth of Staphylococcus aureus*  --   --    MRSA CULTURE ONLY  Methicillin Resistant Staphylococcus aureus isolated*  --   --   --        Imaging Studies:   I have personally reviewed pertinent imaging study reports and images in PACS      EKG, Pathology, and Other Studies:   I have personally reviewed pertinent reports

## 2019-11-06 NOTE — DISCHARGE INSTRUCTIONS
Half strength peroxide or gentle soap on qtip to cleanse the area daily would also be beneficial  Nasal saline spray/rinse to nares TID

## 2019-11-06 NOTE — SOCIAL WORK
Dr Rafa Roy spoke with patient's daughter in law Da Hartley explained patient being d/c today  Da Hartley agreed to sign IMM letter and informed Cm that patient needs medical transport home  CM arranged [facility] for 3 pm  DIL updated  IMM placed in Medical records bin to be copied into [facility]  Medical Necessity placed in bin for copy to be put into Epic

## 2019-11-06 NOTE — SOCIAL WORK
CM attempted to give IMM document to patients daughter in law due to patients dementia  Patients daughter in law Sylvia Martinez became irate and agitated  Zheng Jaramillo told CM that it was inappropriate for CM to give this letter because Cm was not a physician and cannot order the patients d/c  Cm reminded Claudetta Ganong that on November 4 Cm dicussed the possibility that the patient might be d/c today  Claudetta Ganong told CM that until she speaks to a physician and gets an update it is "Inappropriate for CM to discuss the patients discharge " Cm notified Dr Paris Barron  Patient's daughter in law also complained she needed advanced notice because to the private care givers and Claudetta Ganong needs to notify them  Daughter in law refused to sign the IMM

## 2019-11-07 NOTE — ASSESSMENT & PLAN NOTE
Wt Readings from Last 3 Encounters:   11/06/19 76 8 kg (169 lb 6 4 oz)   11/03/19 85 3 kg (188 lb)   11/03/19 85 3 kg (188 lb 0 8 oz)       · H/o volume overload in past  · No echo available to determine systolic or diastolic  · On lasix 40 BID at home with PO potassium  · hold them both for now  · Has doctors primarily at [facility]

## 2019-11-07 NOTE — DISCHARGE SUMMARY
Discharge Summary - [facility]    Patient Information: Sofie Sever 80 y o  female MRN: [id number]  Unit/Bed#: [facility] 830-01 Encounter: [id number]    Discharging Physician / Practitioner: Chance Ramsey MD  PCP: Sherly Curtis MD  Admission Date: 11/3/2019  Discharge Date: 11/06/19    Disposition:     Home    Reason for Admission:  Abscess of right periorbital region    Discharge Diagnoses:     Principal Problem:    Abscess of periorbital region, right  Active Problems:    Hypertension    Kyphosis    Gout    Chronic congestive heart failure ([facility] )    A-fib ([facility] )    MRSA exposure    Lesion of lung    Dementia ([facility] )    Acute ethmoidal sinusitis  Resolved Problems:    * No resolved hospital problems  *      Consultations During Hospital Stay:  · ENT   · Ophthalmology  · Infectious Disease    Procedures Performed:     · Incision and drainage of the right bao orbital abscess right anterior ethmoidectomy    Significant Findings / Test Results:   CT facial bone-evidence of right across cystitis with associated preseptal soft tissue cellulitis and 1 2 cm abscess no postseptal extension of the inflammatory changes  Left greater than right maxillary sinus disease  Partially imaged right greater than left supraclavicular lymphadenopathy  Chest x-ray-no acute pulmonary disease  Wound culture-MRSA    Incidental Findings:       Test Results Pending at Discharge (will require follow up):   ·      Outpatient Tests Requested:  ·     Complications:   none    Hospital Course:     Sofie Sever is a 80 y o  female patient who originally presented to the hospital on 11/3/2019 due to worsening of right eye swelling   Patient with advanced dementia more and not able to provide much history  Patient had a CT scan of the facial bones done in [facility] which showed the presence of Dr  Cystitis with 1 2 cm abscess  Patient was transferred to [facility] to be evaluated by ENT and of pulmonology  Patient was evaluated by ENT and Ophthalmology and ENT to the patient to the OR for I and D of the abscess  Patient tolerated the procedure well  Patient was started on antibiotics per Infectious Disease  MRSA nasal swab came back positive and also the wound culture came back positive  Patient was on vancomycin and clindamycin initially in the hospital   Antibiotics was changed to doxycycline by Infectious Disease  Patient was continued on the topical antibiotic ointments from the ophthalmology standpoint  Patient will be discharged home with mupirocin intranasally given MRSA colonization and infection  Patient remained hemodynamically stable and afebrile  Patient was discharged in a stable condition with outpatient follow-up with ENT and of pulmonology on 11/6/2019  Patient lives at home under the care of his family and also she has caregivers at home  All other chronic medical conditions remained stable  For details refer to the chart    Condition at Discharge: good     Discharge Day Visit / Exam:     Subjective:  Patient seen and examined  No specific complaints offered  Nursing reported that patient was eating breakfast hand no events overnight     Discussed with the daughter agreeable for discharge home with outpatient follow-up with the ENT of pulmonology and primary care physician  Vitals: Blood Pressure: 153/70 (11/06/19 0819)  Pulse: 66 (11/06/19 0819)  Temperature: 97 9 °F (36 6 °C) (11/06/19 0819)  Temp Source: Oral (11/05/19 0748)  Respirations: 16 (11/06/19 0819)  Height: 5' 5" (165 1 cm) (11/03/19 1014)  Weight - Scale: 76 8 kg (169 lb 6 4 oz) (11/06/19 0542)  SpO2: 95 % (11/06/19 0819)  Exam:   Physical Exam   Constitutional: She appears well-developed  Eyes:   Right bao orbital erythema and swelling is improving  No drainage from the incision site   Neck: No JVD present  Cardiovascular: Normal rate and regular rhythm  No murmur heard    Pulmonary/Chest: Effort normal and breath sounds normal    Abdominal: Soft  Musculoskeletal: Normal range of motion  Neurological: She is alert  Skin: Skin is warm  Discussion with Family:  Patient and daughter abhinav law  who is the power of   Discharge instructions/Information to patient and family:   See after visit summary for information provided to patient and family  Provisions for Follow-Up Care:  See after visit summary for information related to follow-up care and any pertinent home health orders  Planned Readmission:  none     Discharge Statement:  I spent  45  minutes discharging the patient  This time was spent on the day of discharge  I had direct contact with the patient on the day of discharge  Greater than 50% of the total time was spent examining patient, answering all patient questions, arranging and discussing plan of care with patient as well as directly providing post-discharge instructions  Additional time then spent on discharge activities  Discharge Medications:  See after visit summary for reconciled discharge medications provided to patient and family        ** Please Note: This note has been constructed using a voice recognition system **

## 2019-11-08 LAB
BACTERIA BLD CULT: NORMAL
BACTERIA BLD CULT: NORMAL

## 2021-01-08 NOTE — CONSULTS
ADULT SWALLOWING RE-EVALUATION    ASSESSMENT    ASSESSMENT/OVERALL IMPRESSION:        PPE REQUIRED. THIS SLP WORE GLOVES AND DROPLET MASK. HANDS SANITIZED/WASHED UPON ENTRANCE/EXIT. This RE-BSE was completed d/t BSE on 1/06/21 recommended NPO.        C Consultation - Infectious Disease   Rise Cockayne 80 y o  female MRN: [id number]  Unit/Bed#: [facility] 829-01 Encounter: [id number]      IMPRESSION & RECOMMENDATIONS:   Impression/Recommendations:  1  Severe right preseptal cellulitis with abscess within nasolacrimal duct  CT negative for postseptal extension  ENT evaluation noted  Etiology is likely right dacryocystitis with obstruction  Also consideration for ethmoid sinusitis as there is some ethmoid cell opacification  Patient does have history of MRSA positive nasal culture  Suspect Staph aureus infection but cannot yet exclude other organisms, including anaerobes  Patient is going for surgical drainage of abscess with possible nasal endoscopy and right ethmoidectomy if indicated  Fortunately, patient is afebrile and hemodynamically stable     -continue IV vancomycin for now with dosing recommendations per pharmacy as this may be clindamycin-resistant MRSA infection  -continue IV clindamycin for now pending operative findings  If operative findings are consistent with dacryocystitis as the origin of infection, we can discontinue clindamycin   -plan for OR today noted  Will follow up operative findings  Please send OR cultures   -close ENT and Ophthalmology follow-up ongoing  -serial exams  -monitor temperatures and hemodynamics  -monitor CBC  -follow up pending blood cultures    2  MRSA positive nares culture  Patient has history of MRSA colonization  Above infection may be secondary to MRSA  Will continue antibiotic coverage as stated above  3  Alzheimer's dementia  Patient is awake and alert  Mentation is limited  4  Chronic CHF  No clinical evidence of acute exacerbation  Antibiotics:  Vancomycin/clindamycin 1    I discussed above plan with patient, and with Dr Leelee Marroquin from primary service  Thank you for this consultation  We will follow along with you      HISTORY OF PRESENT ILLNESS:  Reason for Consult:  Right periorbital cellulitis and abscess    HPI: Rise Cockayne is a 80 y o  female with Alzheimer's dementia who presents on 11/03/2019 with progressive right eye swelling, pain, purulent drainage  Per medical records, patient has a history of MRSA positive nares culture  No reported history of sinusitis in the past as per the medical records  Patient is at bedside by herself currently and is a relatively poor historian  CT revealed right periorbital cellulitis with 1 2 cm preseptal abscess located within the right nasolacrimal duct with adjacent ethmoid cell opacification, mild paranasal sinus disease on the right  Patient is not having fevers, chills, sweats  She has already been evaluated by ENT and Ophthalmology and will be going to OR today for surgical drainage of abscess via transcutaneous approach with possible nasal endoscopy and right ethmoidectomy  Patient was started on IV vancomycin and clindamycin  Infectious Disease is consulted for further antibiotic guidance  REVIEW OF SYSTEMS:  A complete system-based review of systems is otherwise negative  PAST MEDICAL HISTORY:  Past Medical History:   Diagnosis Date    Alzheimer disease ([facility] )     Anxiety     Hypertension      History reviewed  No pertinent surgical history  FAMILY HISTORY:  Non-contributory    SOCIAL HISTORY:  Social History     Substance and Sexual Activity   Alcohol Use Never    Frequency: Never     Social History     Substance and Sexual Activity   Drug Use Never     Social History     Tobacco Use   Smoking Status Never Smoker   Smokeless Tobacco Never Used       ALLERGIES:  Allergies   Allergen Reactions    Aspirin     Caffeine     Ciprofloxacin     Crab (Diagnostic)     Shellfish-Derived Products     Strawberry C [Ascorbate]     Tomato        MEDICATIONS:  All current active medications have been reviewed      PHYSICAL EXAM:  Vitals:  Temp:  [97 7 °F (36 5 °C)-100 2 °F (37 9 °C)] 98 4 °F (36 9 °C)  HR:  [62-78] 71  Resp:  [17-29] 17  BP: white board in Pt room. PLAN:    To f/u x2 sessions/meals for dysphagia treatment. Will ensure safe tolerance of diet and reinforce swallowing/aspiration precautions. Will monitor for new CXR results.   Family education to be continued as availab (122-158)/(58-80) 143/80  SpO2:  [91 %-97 %] 95 %  Temp (24hrs), Av 6 °F (37 °C), Min:97 7 °F (36 5 °C), Max:100 2 °F (37 9 °C)  Current: Temperature: 98 4 °F (36 9 °C)     Physical Exam:  General:  Awake, alert, elderly  Eyes:  Right periorbital edema and erythema with fluctuant lesion under the right eye with purulent drainage  Range of motion of the eye is intact  Mild conjunctival injection  Oropharynx:  No ulcers, no lesions  Neck:  Supple, no lymphadenopathy  Lungs:  Clear to auscultation bilaterally, no accessory muscle use  Cardiac:  Regular rate and rhythm, no murmurs  Abdomen:  Soft, non-tender, non-distended  Extremities:  No peripheral cyanosis, clubbing, or edema  Skin:  No rashes, no ulcers  Neurological:  Moves all four extremities spontaneously    LABS, IMAGING, & OTHER STUDIES:  Lab Results:  I have personally reviewed pertinent labs  Results from last 7 days   Lab Units 19  0203   POTASSIUM mmol/L 4 5   CHLORIDE mmol/L 99*   CO2 mmol/L 30   BUN mg/dL 20   CREATININE mg/dL 1 08   EGFR ml/min/1 73sq m 44   CALCIUM mg/dL 8 8   AST U/L 12   ALT U/L 14   ALK PHOS U/L 122*     Results from last 7 days   Lab Units 19  0203   WBC Thousand/uL 9 10   HEMOGLOBIN g/dL 12 9   PLATELETS Thousands/uL 215         Imaging Studies:   I have personally reviewed pertinent imaging study reports and images in PACS  CT facial bones shows evidence of right dacrocystitis with associated preseptal soft tissue cellulitis and 1 2 cm abscess  No postseptal extension  Left greater than right maxillary sinus disease  EKG, Pathology, and Other Studies:   I have personally reviewed pertinent reports  Formation: Impaired  Bolus Propulsion: Impaired  Mastication: Impaired  Retention: Impaired    Pharyngeal Phase of Swallow: Impaired  Laryngeal Elevation Timing: Appears impaired  Laryngeal Elevation Strength: Appears impaired  Laryngeal Elevation Coordina

## 2021-07-05 ENCOUNTER — HOSPITAL ENCOUNTER (INPATIENT)
Facility: HOSPITAL | Age: 86
LOS: 7 days | Discharge: HOME HEALTH | DRG: 603 | End: 2021-07-12
Attending: EMERGENCY MEDICINE | Admitting: FAMILY MEDICINE
Payer: MEDICARE

## 2021-07-05 ENCOUNTER — APPOINTMENT (EMERGENCY)
Dept: RADIOLOGY | Facility: HOSPITAL | Age: 86
DRG: 603 | End: 2021-07-05
Payer: MEDICARE

## 2021-07-05 DIAGNOSIS — H05.011: ICD-10-CM

## 2021-07-05 DIAGNOSIS — F02.80 ALZHEIMER'S DEMENTIA (HCC): ICD-10-CM

## 2021-07-05 DIAGNOSIS — L03.211 FACIAL CELLULITIS: Primary | ICD-10-CM

## 2021-07-05 DIAGNOSIS — Z20.818 MRSA EXPOSURE: ICD-10-CM

## 2021-07-05 DIAGNOSIS — G30.9 ALZHEIMER'S DEMENTIA (HCC): ICD-10-CM

## 2021-07-05 DIAGNOSIS — I48.91 ATRIAL FIBRILLATION, UNSPECIFIED TYPE (HCC): ICD-10-CM

## 2021-07-05 DIAGNOSIS — I10 ESSENTIAL HYPERTENSION: ICD-10-CM

## 2021-07-05 LAB
ANION GAP SERPL CALCULATED.3IONS-SCNC: 6 MMOL/L (ref 4–13)
BASOPHILS # BLD MANUAL: 0.21 THOUSAND/UL (ref 0–0.1)
BASOPHILS NFR MAR MANUAL: 1 % (ref 0–1)
BUN SERPL-MCNC: 33 MG/DL (ref 5–25)
CALCIUM SERPL-MCNC: 8.8 MG/DL (ref 8.3–10.1)
CHLORIDE SERPL-SCNC: 118 MMOL/L (ref 100–108)
CO2 SERPL-SCNC: 32 MMOL/L (ref 21–32)
CREAT SERPL-MCNC: 1.23 MG/DL (ref 0.6–1.3)
EOSINOPHIL # BLD MANUAL: 0.43 THOUSAND/UL (ref 0–0.4)
EOSINOPHIL NFR BLD MANUAL: 2 % (ref 0–6)
ERYTHROCYTE [DISTWIDTH] IN BLOOD BY AUTOMATED COUNT: 14.6 % (ref 11.6–15.1)
GFR SERPL CREATININE-BSD FRML MDRD: 37 ML/MIN/1.73SQ M
GLUCOSE SERPL-MCNC: 103 MG/DL (ref 65–140)
HCT VFR BLD AUTO: 38.5 % (ref 34.8–46.1)
HGB BLD-MCNC: 12.1 G/DL (ref 11.5–15.4)
LACTATE SERPL-SCNC: 0.7 MMOL/L (ref 0.5–2)
LYMPHOCYTES # BLD AUTO: 15.78 THOUSAND/UL (ref 0.6–4.47)
LYMPHOCYTES # BLD AUTO: 74 % (ref 14–44)
MAGNESIUM SERPL-MCNC: 2.4 MG/DL (ref 1.6–2.6)
MCH RBC QN AUTO: 35.6 PG (ref 26.8–34.3)
MCHC RBC AUTO-ENTMCNC: 31.4 G/DL (ref 31.4–37.4)
MCV RBC AUTO: 113 FL (ref 82–98)
MONOCYTES # BLD AUTO: 0.85 THOUSAND/UL (ref 0–1.22)
MONOCYTES NFR BLD: 4 % (ref 4–12)
NEUTROPHILS # BLD MANUAL: 2.13 THOUSAND/UL (ref 1.85–7.62)
NEUTS SEG NFR BLD AUTO: 10 % (ref 43–75)
NRBC BLD AUTO-RTO: 0 /100 WBCS
PLATELET # BLD AUTO: 125 THOUSANDS/UL (ref 149–390)
PLATELET BLD QL SMEAR: ABNORMAL
PMV BLD AUTO: 11.4 FL (ref 8.9–12.7)
POLYCHROMASIA BLD QL SMEAR: PRESENT
POTASSIUM SERPL-SCNC: 3.6 MMOL/L (ref 3.5–5.3)
RBC # BLD AUTO: 3.4 MILLION/UL (ref 3.81–5.12)
RBC MORPH BLD: NORMAL
SMUDGE CELLS BLD QL SMEAR: PRESENT
SODIUM SERPL-SCNC: 156 MMOL/L (ref 136–145)
TOTAL CELLS COUNTED SPEC: 100
VARIANT LYMPHS # BLD AUTO: 9 %
WBC # BLD AUTO: 21.32 THOUSAND/UL (ref 4.31–10.16)

## 2021-07-05 PROCEDURE — 96366 THER/PROPH/DIAG IV INF ADDON: CPT

## 2021-07-05 PROCEDURE — 99285 EMERGENCY DEPT VISIT HI MDM: CPT

## 2021-07-05 PROCEDURE — 80048 BASIC METABOLIC PNL TOTAL CA: CPT | Performed by: EMERGENCY MEDICINE

## 2021-07-05 PROCEDURE — 96365 THER/PROPH/DIAG IV INF INIT: CPT

## 2021-07-05 PROCEDURE — 87040 BLOOD CULTURE FOR BACTERIA: CPT | Performed by: EMERGENCY MEDICINE

## 2021-07-05 PROCEDURE — 83605 ASSAY OF LACTIC ACID: CPT | Performed by: EMERGENCY MEDICINE

## 2021-07-05 PROCEDURE — 87186 SC STD MICRODIL/AGAR DIL: CPT | Performed by: EMERGENCY MEDICINE

## 2021-07-05 PROCEDURE — 83735 ASSAY OF MAGNESIUM: CPT | Performed by: EMERGENCY MEDICINE

## 2021-07-05 PROCEDURE — 99283 EMERGENCY DEPT VISIT LOW MDM: CPT | Performed by: EMERGENCY MEDICINE

## 2021-07-05 PROCEDURE — 87070 CULTURE OTHR SPECIMN AEROBIC: CPT | Performed by: EMERGENCY MEDICINE

## 2021-07-05 PROCEDURE — 1123F ACP DISCUSS/DSCN MKR DOCD: CPT | Performed by: EMERGENCY MEDICINE

## 2021-07-05 PROCEDURE — 36415 COLL VENOUS BLD VENIPUNCTURE: CPT | Performed by: EMERGENCY MEDICINE

## 2021-07-05 PROCEDURE — 70487 CT MAXILLOFACIAL W/DYE: CPT

## 2021-07-05 PROCEDURE — 85007 BL SMEAR W/DIFF WBC COUNT: CPT | Performed by: EMERGENCY MEDICINE

## 2021-07-05 PROCEDURE — 87205 SMEAR GRAM STAIN: CPT | Performed by: EMERGENCY MEDICINE

## 2021-07-05 PROCEDURE — G1004 CDSM NDSC: HCPCS

## 2021-07-05 PROCEDURE — 85027 COMPLETE CBC AUTOMATED: CPT | Performed by: EMERGENCY MEDICINE

## 2021-07-05 RX ORDER — FLUVOXAMINE MALEATE 100 MG
100 TABLET ORAL DAILY
COMMUNITY

## 2021-07-05 RX ADMIN — VANCOMYCIN HYDROCHLORIDE 750 MG: 750 INJECTION, SOLUTION INTRAVENOUS at 19:36

## 2021-07-05 RX ADMIN — IOHEXOL 100 ML: 350 INJECTION, SOLUTION INTRAVENOUS at 19:13

## 2021-07-05 RX ADMIN — SODIUM CHLORIDE 500 ML: 0.9 INJECTION, SOLUTION INTRAVENOUS at 19:29

## 2021-07-05 NOTE — ED PROVIDER NOTES
History  Chief Complaint   Patient presents with    Eye Swelling     drainage from left eye, cloudy, redness around eye to surrounding cheek started 2 days ago     29-year-old female with past history of advanced Alzheimer's dementia, hypertension, anxiety, arthritis, lymphoma, presents to the ED for evaluation of facial abscess and cellulitis  Patient has history of MRSA  Patient has swelling to the medial aspect of left eye along with surrounding facial erythema  Patient has had purulent and bloody discharge from the eye  Subjective temperature at home  Patient lives at home with son and daughter-in-law who are her POA  Patient brought to the ED for further evaluation  Family is requesting transfer to [facility] if patient needs to be admitted  Family states the patient had a similar situation a few months ago where she had a blocked tear duct that had an abscess formation that was positive for MRSA  History provided by:  Patient      Prior to Admission Medications   Prescriptions Last Dose Informant Patient Reported? Taking?    METOPROLOL TARTRATE PO Not Taking at Unknown time  Yes No   Sig: Take 50 mg by mouth daily    Patient not taking: Reported on 7/6/2021   MIRTAZAPINE PO 7/5/2021 at Unknown time  Yes Yes   Sig: Take 30 mg by mouth daily    acetaminophen (TYLENOL) 650 mg CR tablet Unknown at Unknown time  No No   Sig: Take 1 tablet (650 mg total) by mouth every 8 (eight) hours as needed for mild pain   bacitracin ophthalmic ointment Not Taking at Unknown time  No No   Sig: Administer to the right eye 2 (two) times a day   Patient not taking: Reported on 7/6/2021   fluvoxaMINE (LUVOX) 100 mg tablet 7/5/2021 at Unknown time  Yes Yes   Sig: Take 100 mg by mouth daily   furosemide (LASIX) 40 mg tablet 7/5/2021 at Unknown time  Yes Yes   Sig: Take 40 mg by mouth 2 (two) times a day   metoprolol succinate (TOPROL-XL) 50 mg 24 hr tablet Unknown at Unknown time  Yes No   Sig: Take 50 mg by mouth daily   potassium chloride (MICRO-K) 10 MEQ CR capsule 7/5/2021 at Unknown time  Yes Yes   Sig: Take 20 mEq by mouth daily      Facility-Administered Medications: None       Past Medical History:   Diagnosis Date    A-fib ([place] [address] )     Alzheimer disease ([place] [address] )     Anxiety     Arthritis     [facility])     Edema     Hypertension     Lymphoma ([place] [address] )     MRSA (methicillin resistant staph aureus) culture positive        Past Surgical History:   Procedure Laterality Date    INCISION AND DRAINAGE ANTERIOR NECK Right 11/3/2019    Procedure: INCISION AND DRAINAGE  (I&D) right periorbital abscess; right dacryocystorhinostomy/dilation of nasolacrimal duct; Surgeon: Liya Lester MD;  [facility];  Service: ENT    NASAL/SINUS ENDOSCOPY Bilateral 11/3/2019    Procedure: Bilateral nasal endoscopy, right anterior ethmoidectomy, image guidance ;  Surgeon: Liya Lester MD;  [facility];  Service: ENT       History reviewed  No pertinent family history  I have reviewed and agree with the history as documented  E-Cigarette/Vaping    E-Cigarette Use Never User      E-Cigarette/Vaping Substances     Social History     Tobacco Use    Smoking status: Never Smoker    Smokeless tobacco: Never Used   Vaping Use    Vaping Use: Never used   Substance Use Topics    Alcohol use: Never    Drug use: Never       Review of Systems   Unable to perform ROS: Dementia   Skin: Positive for rash  Physical Exam  Physical Exam  Vitals and nursing note reviewed  Constitutional:       General: She is not in acute distress  Appearance: She is well-developed  HENT:      Head: Normocephalic and atraumatic  Eyes:      Extraocular Movements: Extraocular movements intact  Conjunctiva/sclera: Conjunctivae normal       Pupils: Pupils are equal, round, and reactive to light        Comments: 1 cm x 1 cm area of swelling noted over the medial aspect of left eye that is tender to palpation  Surrounding erythema and edema noted to the left side of periorbital region and face  Please see picture for clarification  No conjunctival injection noted bilaterally  Cardiovascular:      Rate and Rhythm: Normal rate and regular rhythm  Heart sounds: No murmur heard  Pulmonary:      Effort: Pulmonary effort is normal  No respiratory distress  Breath sounds: Normal breath sounds  Abdominal:      Palpations: Abdomen is soft  Tenderness: There is no abdominal tenderness  Musculoskeletal:      Cervical back: Neck supple  Skin:     General: Skin is warm and dry  Neurological:      Mental Status: She is alert  Comments: Generalized weakness noted without any obvious focal neuro deficits  Full neuro exam is limited due to advanced Alzheimer's dementia  Vital Signs  ED Triage Vitals   Temperature Pulse Respirations Blood Pressure SpO2   07/05/21 1724 07/05/21 1724 07/05/21 1724 07/05/21 1725 07/05/21 1724   98 7 °F (37 1 °C) (!) 53 16 109/53 92 %      Temp Source Heart Rate Source Patient Position - Orthostatic VS BP Location FiO2 (%)   07/05/21 1724 07/05/21 1724 07/05/21 1724 07/05/21 1724 --   Axillary Monitor Sitting Left arm       Pain Score       --                  Vitals:    07/05/21 1724 07/05/21 1725 07/05/21 2100 07/06/21 0001   BP:  109/53  109/55   Pulse: (!) 53  56 56   Patient Position - Orthostatic VS: Sitting   Lying         Visual Acuity      ED Medications  Medications   sodium chloride 0 9 % bolus 500 mL (0 mL Intravenous Stopped 7/5/21 2112)   vancomycin (VANCOCIN) IVPB (premix in dextrose) 750 mg 150 mL (0 mg/kg × 56 kg Intravenous Stopped 7/5/21 2112)   iohexol (OMNIPAQUE) 350 MG/ML injection (SINGLE-DOSE) 100 mL (100 mL Intravenous Given 7/5/21 1913)       Diagnostic Studies  Results Reviewed     Procedure Component Value Units Date/Time    Wound culture and Gram stain [[id number]] Collected: 07/05/21 7172    Lab Status:  In process Specimen: Wound from Cheek Updated: 07/05/21 2354    Lactic acid [[id number]]  (Normal) Collected: 07/05/21 1938    Lab Status: Final result Specimen: Blood from Arm, Left Updated: 07/05/21 2005     LACTIC ACID 0 7 mmol/L     Narrative:      Result may be elevated if tourniquet was used during collection  Blood culture #1 [[id number]] Collected: 07/05/21 1938    Lab Status: In process Specimen: Blood from Arm, Left Updated: 07/05/21 1947    Blood culture #2 [[id number]] Collected: 07/05/21 1938    Lab Status: In process Specimen: Blood from Hand, Left Updated: 07/05/21 1947    Manual Differential(PHLEBS Do Not Order) [[id number]]  (Abnormal) Collected: 07/05/21 1805    Lab Status: Final result Specimen: Blood from Arm, Left Updated: 07/05/21 1839     Segmented % 10 %      Lymphocytes % 74 %      Monocytes % 4 %      Eosinophils, % 2 %      Basophils % 1 %      Atypical Lymphocytes % 9 %      Absolute Neutrophils 2 13 Thousand/uL      Lymphocytes Absolute 15 78 Thousand/uL      Monocytes Absolute 0 85 Thousand/uL      Eosinophils Absolute 0 43 Thousand/uL      Basophils Absolute 0 21 Thousand/uL      Total Counted 100     Smudge Cells Present     RBC Morphology Normal     Polychromasia Present     Platelet Estimate Borderline    CBC and differential [[id number]]  (Abnormal) Collected: 07/05/21 1805    Lab Status: Final result Specimen: Blood from Arm, Left Updated: 07/05/21 1839     WBC 21 32 Thousand/uL      RBC 3 40 Million/uL      Hemoglobin 12 1 g/dL      Hematocrit 38 5 %       fL      MCH 35 6 pg      MCHC 31 4 g/dL      RDW 14 6 %      MPV 11 4 fL      Platelets 374 Thousands/uL      nRBC 0 /100 WBCs     Narrative: This is an appended report  These results have been appended to a previously verified report      Basic metabolic panel [[id number]]  (Abnormal) Collected: 07/05/21 1805    Lab Status: Final result Specimen: Blood from Arm, Left Updated: 07/05/21 1824     Sodium 156 mmol/L      Potassium 3 6 mmol/L      Chloride 118 mmol/L      CO2 32 mmol/L      ANION GAP 6 mmol/L      BUN 33 mg/dL      Creatinine 1 23 mg/dL      Glucose 103 mg/dL      Calcium 8 8 mg/dL      eGFR 37 ml/min/1 73sq m     Narrative:      [place] guidelines for Chronic Kidney Disease (CKD):     Stage 1 with normal or high GFR (GFR > 90 mL/min/1 73 square meters)    Stage 2 Mild CKD (GFR = 60-89 mL/min/1 73 square meters)    Stage 3A Moderate CKD (GFR = 45-59 mL/min/1 73 square meters)    Stage 3B Moderate CKD (GFR = 30-44 mL/min/1 73 square meters)    Stage 4 Severe CKD (GFR = 15-29 mL/min/1 73 square meters)    Stage 5 End Stage CKD (GFR <15 mL/min/1 73 square meters)  Note: GFR calculation is accurate only with a steady state creatinine    Magnesium [[id number]]  (Normal) Collected: 07/05/21 1805    Lab Status: Final result Specimen: Blood from Arm, Left Updated: 07/05/21 1824     Magnesium 2 4 mg/dL                  CT facial bones with contrast   Final Result by Blade Agarwal MD (07/05 2032)      1  Subcutaneous inflammation in the left nasal region and nasolacrimal fold, contiguous with left ethmoid air cell opacification via focal dehiscence of the medial wall of the left orbit and lamina papyracea, suggesting suggesting sinogenic source of    cellulitis  No evidence of abscess  2   Left nasal inflammation extends into the anteromedial aspect of the left orbit  Focal dehiscence of the floor of the left orbit without evidence of direct extension of inflammation  No retrobulbar inflammation or subperiosteal abscess  3   Extensive paranasal sinus disease  4   Recommend ENT and ophthalmology consultation  The study was marked in [facility] for immediate notification           Workstation performed: [id number]                    Procedures  Procedures         ED Course  ED Course as of Jul 06 0124   Mon Jul 05, 2021   2100 Case discussed with [facility] nurse who was trying to arrange for transfer to [facility]  Family made aware  2319 Case discussed with ophthalmologist on-call, Dr Matt Goodman, who reviewed lab and radiology results  At this time patient's radiology report shows concern with preseptal cellulitis without involving the orbit  There is no conjunctival injection noted  It does look like a possible abscess versus obstruction of the lacrimal duct with a medial swelling of the eye  At this time if the area is already draining then patient can be admitted for IV antibiotics with ENT consult  No ophthalmological procedures are recommended at this time  2343 I went to re-examine the patient and it locally the swollen area in the medial aspect of the eye started to drain  I was able to press and express some purulent discharge  Wound cultures are sent  I then discussed the case again with the ophthalmologist, Dr Matt Goodman, who now feels strongly that patient can be admitted for IV antibiotics without any ophthalmological procedures  SBIRT 22yo+      Most Recent Value   SBIRT (24 yo +)   In order to provide better care to our patients, we are screening all of our patients for alcohol and drug use  Would it be okay to ask you these screening questions?   Unable to answer at this time Filed at: 07/05/2021 2138                    MDM  Number of Diagnoses or Management Options  Alzheimer's dementia [facility]): new and requires workup  Facial cellulitis: new and requires workup  Diagnosis management comments: Obtain blood work, CT facial bones  Given IV antibiotics and plan for admission       Amount and/or Complexity of Data Reviewed  Clinical lab tests: ordered and reviewed  Tests in the radiology section of CPT®: ordered and reviewed  Tests in the medicine section of CPT®: ordered and reviewed  Review and summarize past medical records: yes  Independent visualization of images, tracings, or specimens: yes    Risk of Complications, Morbidity, and/or Mortality  General comments: Patient had leukocytosis in the ED  CT scan showed concern for extensive facial cellulitis without any orbital involvement  Patient did have an abscess formation in the medial aspect of left eye that spontaneously started to drain in the ED  I was able to express more pus with application of pressure to the area  Wound cultures obtained  Empiric IV vancomycin started as patient has history of MRSA  Case was discussed with ophthalmologist who stated the patient can be admitted at our [place] as there is no emergent ophthalmological surgery indicated at this time  Family originally requested to be transferred to [address] [place] [address] however the transfer process was very extensive and receiving hospital did not have any beds  At that time family decided to have patient admitted to our hospital for IV antibiotics and further evaluation and management  Patient Progress  Patient progress: stable      Disposition  Final diagnoses:   Facial cellulitis   Alzheimer's dementia [facility])     Time reflects when diagnosis was documented in both MDM as applicable and the Disposition within this note     Time User Action Codes Description Comment    7/5/2021 11:51 PM Prema Kamryn Add [O44 100] Facial cellulitis     7/5/2021 11:51 PM [place] Add [G30 9,  F02 80] Alzheimer's dementia ([facility] [address] )     7/6/2021  1:19 AM Charmaine Alvarado Add [Z20 818] MRSA exposure       ED Disposition     ED Disposition Condition Date/Time Comment    Admit Stable Mon Jul 5, 2021 11:51 PM Case was discussed with NP for hospital and the patient's admission status was agreed to be Admission Status: inpatient status to the service of Dr Oksana Zacarias  Follow-up Information    None         Patient's Medications   Discharge Prescriptions    No medications on file     No discharge procedures on file      PDMP Review       Value Time User    PDMP Reviewed  Yes 11/6/2019  1:54 PM Swathi Rocha MD          ED Provider  Electronically Signed by           Francoise Ruelas DO  07/06/21 5371

## 2021-07-06 PROBLEM — Z86.14 HISTORY OF MRSA INFECTION: Status: ACTIVE | Noted: 2019-11-03

## 2021-07-06 PROBLEM — L03.213 PERIORBITAL CELLULITIS OF LEFT EYE: Status: ACTIVE | Noted: 2021-07-06

## 2021-07-06 PROBLEM — E87.0 HYPERNATREMIA: Status: ACTIVE | Noted: 2021-07-06

## 2021-07-06 PROBLEM — C91.10 CLL (CHRONIC LYMPHOCYTIC LEUKEMIA) (HCC): Status: ACTIVE | Noted: 2021-07-06

## 2021-07-06 PROBLEM — N17.9 AKI (ACUTE KIDNEY INJURY) (HCC): Status: ACTIVE | Noted: 2021-07-06

## 2021-07-06 LAB
ANION GAP SERPL CALCULATED.3IONS-SCNC: 7 MMOL/L (ref 4–13)
BUN SERPL-MCNC: 25 MG/DL (ref 5–25)
CALCIUM SERPL-MCNC: 8.6 MG/DL (ref 8.3–10.1)
CHLORIDE SERPL-SCNC: 113 MMOL/L (ref 100–108)
CO2 SERPL-SCNC: 30 MMOL/L (ref 21–32)
CREAT SERPL-MCNC: 1.2 MG/DL (ref 0.6–1.3)
ERYTHROCYTE [DISTWIDTH] IN BLOOD BY AUTOMATED COUNT: 14.5 % (ref 11.6–15.1)
GFR SERPL CREATININE-BSD FRML MDRD: 39 ML/MIN/1.73SQ M
GLUCOSE SERPL-MCNC: 109 MG/DL (ref 65–140)
HCT VFR BLD AUTO: 39.7 % (ref 34.8–46.1)
HGB BLD-MCNC: 11.8 G/DL (ref 11.5–15.4)
MCH RBC QN AUTO: 35.2 PG (ref 26.8–34.3)
MCHC RBC AUTO-ENTMCNC: 29.7 G/DL (ref 31.4–37.4)
MCV RBC AUTO: 119 FL (ref 82–98)
NRBC BLD AUTO-RTO: 0 /100 WBCS
PLATELET # BLD AUTO: 119 THOUSANDS/UL (ref 149–390)
PMV BLD AUTO: 11.9 FL (ref 8.9–12.7)
POTASSIUM SERPL-SCNC: 4.5 MMOL/L (ref 3.5–5.3)
PROCALCITONIN SERPL-MCNC: <0.05 NG/ML
RBC # BLD AUTO: 3.35 MILLION/UL (ref 3.81–5.12)
SODIUM SERPL-SCNC: 150 MMOL/L (ref 136–145)
VANCOMYCIN SERPL-MCNC: 5.9 UG/ML
WBC # BLD AUTO: 20.14 THOUSAND/UL (ref 4.31–10.16)

## 2021-07-06 PROCEDURE — 99223 1ST HOSP IP/OBS HIGH 75: CPT | Performed by: INTERNAL MEDICINE

## 2021-07-06 PROCEDURE — 85027 COMPLETE CBC AUTOMATED: CPT | Performed by: PHYSICIAN ASSISTANT

## 2021-07-06 PROCEDURE — 84145 PROCALCITONIN (PCT): CPT | Performed by: PHYSICIAN ASSISTANT

## 2021-07-06 PROCEDURE — 92610 EVALUATE SWALLOWING FUNCTION: CPT

## 2021-07-06 PROCEDURE — 80202 ASSAY OF VANCOMYCIN: CPT | Performed by: PHYSICIAN ASSISTANT

## 2021-07-06 PROCEDURE — 80048 BASIC METABOLIC PNL TOTAL CA: CPT | Performed by: PHYSICIAN ASSISTANT

## 2021-07-06 PROCEDURE — 97165 OT EVAL LOW COMPLEX 30 MIN: CPT

## 2021-07-06 PROCEDURE — 99222 1ST HOSP IP/OBS MODERATE 55: CPT | Performed by: OTOLARYNGOLOGY

## 2021-07-06 RX ORDER — SODIUM CHLORIDE 9 MG/ML
50 INJECTION, SOLUTION INTRAVENOUS CONTINUOUS
Status: DISCONTINUED | OUTPATIENT
Start: 2021-07-06 | End: 2021-07-06

## 2021-07-06 RX ORDER — FLUVOXAMINE MALEATE 100 MG
100 TABLET ORAL DAILY
Status: DISCONTINUED | OUTPATIENT
Start: 2021-07-06 | End: 2021-07-12 | Stop reason: HOSPADM

## 2021-07-06 RX ORDER — DEXTROSE MONOHYDRATE 50 MG/ML
50 INJECTION, SOLUTION INTRAVENOUS CONTINUOUS
Status: DISCONTINUED | OUTPATIENT
Start: 2021-07-06 | End: 2021-07-08

## 2021-07-06 RX ORDER — METOPROLOL SUCCINATE 50 MG/1
50 TABLET, EXTENDED RELEASE ORAL DAILY
Status: DISCONTINUED | OUTPATIENT
Start: 2021-07-06 | End: 2021-07-09

## 2021-07-06 RX ORDER — DEXAMETHASONE SODIUM PHOSPHATE 4 MG/ML
10 INJECTION, SOLUTION INTRA-ARTICULAR; INTRALESIONAL; INTRAMUSCULAR; INTRAVENOUS; SOFT TISSUE EVERY 8 HOURS SCHEDULED
Status: COMPLETED | OUTPATIENT
Start: 2021-07-06 | End: 2021-07-06

## 2021-07-06 RX ORDER — METOPROLOL SUCCINATE 50 MG/1
50 TABLET, EXTENDED RELEASE ORAL DAILY
COMMUNITY
End: 2021-07-12 | Stop reason: HOSPADM

## 2021-07-06 RX ORDER — MIRTAZAPINE 15 MG/1
30 TABLET, FILM COATED ORAL DAILY
Status: DISCONTINUED | OUTPATIENT
Start: 2021-07-06 | End: 2021-07-12 | Stop reason: HOSPADM

## 2021-07-06 RX ORDER — HEPARIN SODIUM 5000 [USP'U]/ML
5000 INJECTION, SOLUTION INTRAVENOUS; SUBCUTANEOUS EVERY 8 HOURS SCHEDULED
Status: DISCONTINUED | OUTPATIENT
Start: 2021-07-06 | End: 2021-07-09

## 2021-07-06 RX ORDER — ACETAMINOPHEN 325 MG/1
650 TABLET ORAL EVERY 6 HOURS PRN
Status: DISCONTINUED | OUTPATIENT
Start: 2021-07-06 | End: 2021-07-12 | Stop reason: HOSPADM

## 2021-07-06 RX ADMIN — HEPARIN SODIUM 5000 UNITS: 5000 INJECTION INTRAVENOUS; SUBCUTANEOUS at 14:08

## 2021-07-06 RX ADMIN — DEXTROSE 50 ML/HR: 5 SOLUTION INTRAVENOUS at 20:24

## 2021-07-06 RX ADMIN — DEXTROSE 50 ML/HR: 5 SOLUTION INTRAVENOUS at 02:22

## 2021-07-06 RX ADMIN — DEXAMETHASONE SODIUM PHOSPHATE 10 MG: 4 INJECTION INTRA-ARTICULAR; INTRALESIONAL; INTRAMUSCULAR; INTRAVENOUS; SOFT TISSUE at 14:08

## 2021-07-06 RX ADMIN — HEPARIN SODIUM 5000 UNITS: 5000 INJECTION INTRAVENOUS; SUBCUTANEOUS at 06:43

## 2021-07-06 RX ADMIN — HEPARIN SODIUM 5000 UNITS: 5000 INJECTION INTRAVENOUS; SUBCUTANEOUS at 21:47

## 2021-07-06 RX ADMIN — DEXAMETHASONE SODIUM PHOSPHATE 10 MG: 4 INJECTION INTRA-ARTICULAR; INTRALESIONAL; INTRAMUSCULAR; INTRAVENOUS; SOFT TISSUE at 21:48

## 2021-07-06 RX ADMIN — FLUVOXAMINE MALEATE 100 MG: 100 TABLET ORAL at 09:59

## 2021-07-06 RX ADMIN — MIRTAZAPINE 30 MG: 15 TABLET, FILM COATED ORAL at 09:59

## 2021-07-06 RX ADMIN — VANCOMYCIN HYDROCHLORIDE 750 MG: 750 INJECTION, SOLUTION INTRAVENOUS at 21:47

## 2021-07-06 NOTE — PLAN OF CARE
Problem: Potential for Falls  Goal: Patient will remain free of falls  Description: INTERVENTIONS:  - Educate patient/family on patient safety including physical limitations  - Instruct patient to call for assistance with activity   - Consult OT/PT to assist with strengthening/mobility   - Keep Call bell within reach  - Keep bed low and locked with side rails adjusted as appropriate  - Keep care items and personal belongings within reach  - Initiate and maintain comfort rounds  - Make Fall Risk Sign visible to staff  - Offer Toileting every 2 Hours, in advance of need  - Initiate/Maintain 1alarm  - Obtain necessary fall risk management equipment: bed alarm, call bell use  - Apply yellow socks and bracelet for high fall risk patients  - Consider moving patient to room near nurses station  Outcome: Progressing     Problem: Prexisting or High Potential for Compromised Skin Integrity  Goal: Skin integrity is maintained or improved  Description: INTERVENTIONS:  - Identify patients at risk for skin breakdown  - Assess and monitor skin integrity  - Assess and monitor nutrition and hydration status  - Monitor labs   - Assess for incontinence   - Turn and reposition patient  - Assist with mobility/ambulation  - Relieve pressure over bony prominences  - Avoid friction and shearing  - Provide appropriate hygiene as needed including keeping skin clean and dry  - Evaluate need for skin moisturizer/barrier cream  - Collaborate with interdisciplinary team   - Patient/family teaching  - Consider wound care consult   Outcome: Progressing

## 2021-07-06 NOTE — ASSESSMENT & PLAN NOTE
· Prior admission for right periorbital abscess treated with Doxycyline 11/2019   · Patient was discharged home with mupirocin intranasally given MRSA colonization and infection    · Per daughter, pt has PO doxycyline at home which patient would take as prophylaxis if noticed increased redness/swelling around the eyes   · Patient is on day 10 of PO doxycyline at home with no relief of eye symptoms- stop doxycyline  · IV Vancomycin pending ID consult- appreciate input   · Culture of right eye drainage: pending

## 2021-07-06 NOTE — ASSESSMENT & PLAN NOTE
· Suspected in the setting of dehydration and poor PO intake   · Gentle IVF hydration with D5W   · Recheck in AM

## 2021-07-06 NOTE — OCCUPATIONAL THERAPY NOTE
OT EVALUATION       07/06/21 0915   Note Type   Note type Evaluation   Restrictions/Precautions   Other Precautions Chair Alarm;Cognitive; Bed Alarm; Fall Risk   Pain Assessment   Pain Assessment Tool FLACC   Pain Rating: FLACC (Rest) - Face 0   Pain Rating: FLACC (Rest) - Legs 0   Pain Rating: FLACC (Rest) - Activity 0   Pain Rating: FLACC (Rest) - Cry 0   Pain Rating: FLACC (Rest) - Consolability 0   Score: FLACC (Rest) 0   Home Living   Additional Comments pt is a poor historian, unable to state home setup or prior level of function  Patient is lethargic with little participation in session  Dementia history    Prior Function   Level of [place] Total dependent  (per chart non-ambulatory/bedbound )   Lives With Family   Receives Help From Family   ADL   Eating Assistance 1  Total Assistance   Grooming Assistance 1  Total Assistance   UB Bathing Assistance 1  Total Assistance   LB Bathing Assistance 1  Total Assistance   UB Dressing Assistance 1  Total Assistance   LB Dressing Assistance 1  Total Assistance   Toileting Assistance  1  Total Assistance   Bed Mobility   Rolling R 1  Dependent   Rolling L 1  Dependent   Supine to Sit 1  Dependent   Sit to Supine 1  Dependent   Transfers   Sit to Stand 1  Dependent   Activity Tolerance   Activity Tolerance Other (Comment)  (cognition)   Nurse Made Aware yes, Deanne   RUE Assessment   RUE Assessment   (PROM WFL, resistive with ROM)   LUE Assessment   LUE Assessment   (PROM WFL, resistive with ROM)   Cognition   Overall Cognitive Status Impaired   Arousal/Participation Lethargic   Attention Difficulty dividing attention   Orientation Level Disoriented X4   Following Commands Unable to follow one step commands   Assessment   Prognosis Poor   Assessment Patient evaluated by Occupational Therapy  Patient admitted with Periorbital cellulitis of left eye    The patients occupational profile, medical and therapy history includes a extensive additional review of physical, cognitive, or psychosocial history related to current functional performance  Comorbidities affecting functional mobility and ADLS include: Lymphoma, MRSA, Afib, anxiety, arthritis, CHF, dementia and hypertension  Prior to admission, patient was dependent for mobility, requiring assist for ADLS, requiring assist for IADLS and having 24 hour care   The evaluation identifies the following performance deficits: weakness, decreased ROM, impaired balance, decreased endurance, increased fall risk, new onset of impairment of functional mobility, decreased ADLS, decreased IADLS, decreased activity tolerance, decreased safety awareness, impaired judgement, decreased cognition and decreased strength, that result in activity limitations and/or participation restrictions  This evaluation requires clinical decision making of low complexity, because the patients presents with no comorbidities that affect occupational performance and required no modification of tasks or assistance with consideration of a limited number of treatment options  The Barthel Index was used as a functional outcome tool presenting with a score of 0, indicating marked limitations of functional mobility and ADLS  The patient's raw score on the -PAC Daily Activity inpatient short form low function score is 10, standardized score is Low Function Daily Activity Standardized Score: 17 31  Patients with a standardized score less than 39 4 are likely to benefit from discharge to post-acute rehab services  Please refer to the recommendation of the Occupational Therapist for safe discharge planning, however pt is at baseline level of function and has 24 hour assist at home  No further skilled PT/OT needs required at this time  Per chart pt is bed bound      Goals   Patient Goals unable to state due to cognitive impairment    Recommendation   OT Discharge Recommendation No rehabilitation needs   AM-PAC Daily Activity Inpatient   Lower Body Dressing 1 Bathing 1   Toileting 1   Upper Body Dressing 1   Grooming 1   Eating 1   Daily Activity Raw Score 6   Turning Head Towards Sound 2   Follow Simple Instructions 2   Low Function Daily Activity Raw Score 10   Low Function Daily Activity Standardized Score 17 31   AM-PAC Applied Cognition Inpatient   Following a Speech/Presentation 1   Understanding Ordinary Conversation 2   Taking Medications 1   Remembering Where Things Are Placed or Put Away 1   Remembering List of 4-5 Errands 1   Taking Care of Complicated Tasks 1   Applied Cognition Raw Score 7   Applied Cognition Standardized Score 15 17   Barthel Index   Feeding 0   Bathing 0   Grooming Score 0   Dressing Score 0   Bladder Score 0   Bowels Score 0   Toilet Use Score 0   Transfers (Bed/Chair) Score 0   Mobility (Level Surface) Score 0   Stairs Score 0   Barthel Index Score 0   Licensure   [place] License Number  Meg Darnell MS OTR/L [id number]

## 2021-07-06 NOTE — H&P
Ana Paula 45  H&P- Doyle Brink 12/1/1925, 80 y o  female MRN: [id number]  Unit/Bed#: [address] [place] [address] Encounter: [id number]  Primary Care Provider: Ruby Davies MD   Date and time admitted to hospital: 7/5/2021  5:31 PM    * Periorbital cellulitis of left eye  Assessment & Plan  79 y/o woman with pmhx of recurring right periorbital cellulitis/abscess +MRSA requiring OR I&D, CLL/lymphoma s/p radiation, Alzheimer's dementia, HTN, afib, CHF brought to ER by daughter c/o left eye erythema, swelling, bloody drainage and subjective fever x10 days  Patient is on day 10 of home PO doxycyline 100mg BID  · Currently afebrile but with leukocytosis   · Per daughter, subjective fever at home   · CT facial bones with contrast:   · Subcutaneous inflammation in the left nasal region and nasolacrimal fold, contiguous with left ethmoid air cell opacification via focal dehiscence of the medial wall of the left orbit and lamina papyracea, suggesting suggesting sinogenic source of cellulitis  No evidence of abscess  · Left nasal inflammation extends into the anteromedial aspect of the left orbit  Focal dehiscence of the floor of the left orbit without evidence of direct extension of inflammation  No retrobulbar inflammation or subperiosteal abscess  · Extensive paranasal sinus disease   · Culture of left eye drainage: pending   · Empirically treat with IV Vancomycin given hx of prior MRSA infection and abscess of right eye requiring OR I&D 11/2019   · ENT and ID consults placed- appreciate input   · Blood cultures x2: pending   · Procalcitonin: pending   · Lactic acid: negative   · Monitor fever curve/hemodynamics       History of MRSA infection  Assessment & Plan  · Prior admission for right periorbital abscess treated with Doxycyline 11/2019   · Patient was discharged home with mupirocin intranasally given MRSA colonization and infection    · Per daughter, pt has PO doxycyline at home which patient would take as prophylaxis if noticed increased redness/swelling around the eyes   · Patient is on day 10 of PO doxycyline at home with no relief of eye symptoms- stop doxycyline  · IV Vancomycin pending ID consult- appreciate input   · Culture of right eye drainage: pending     CLL (chronic lymphocytic leukemia) ([facility] )  Assessment & Plan  · Patient follows with otolaryngology as outpatient   · Hx of radiation for right eye lymph node 12/2019   · Upon review of prior records, it is suggested that her CLL/lymphoma diagnosis is the underlying process for her recurring periorbital cellulitis infections which requires close monitoring by ENT or Dr Canelo Conteh with otolaryngology to optimize healing of the nasolacrimal duct system/sinonasal passages   · ENT consult placed- appreciate further recommendations     MINI (acute kidney injury) ([place] [address] )  Assessment & Plan  · Present on admission   · Baseline creatinine: 0 8-1 0   · Creatine today 1 23  · Likely pre-renal in setting of dehydration and poor PO intake   · Gentle IVF hydration with D5W  · Hold PO lasix and potassium supplementation   · Avoid hypotension/nephrotoxins   · Urinary retention protocol, bladder scan     Hypernatremia  Assessment & Plan  · Suspected in the setting of dehydration and poor PO intake   · Gentle IVF hydration with D5W   · Recheck in AM     Dementia ([place] [address] )  Assessment & Plan  · Patient currently lives at home with daughter [facility]) and is taken care of by her and caregiver   · Continue fluvoxamine 100mg and mirtazapine 30mg daily   · PT/OT consult: pending   · Speech eval: pending       A-fib ([place] [address] )  Assessment & Plan  · Rate controlled with metoprolol succinate 50mg daily   · Not on anticoagulation       Chronic congestive heart failure (HCC)  Assessment & Plan  Wt Readings from Last 3 Encounters:   07/05/21 56 kg (123 lb 7 3 oz)   11/06/19 76 8 kg (169 lb 6 4 oz)   11/03/19 85 3 kg (188 lb)       · No clinical evidence of acute exacerbation   · Patient appears to be dry, no signs of fluid overload   · Hold PO lasix and K supplementation   · I/O   · Daily weights          Hypertension  Assessment & Plan  · BP currently stable   · Continue metoprolol succinate 50mg qAM       VTE Pharmacologic Prophylaxis: VTE Score: 9 High Risk (Score >/= 5) - Pharmacological DVT Prophylaxis Ordered: heparin  Sequential Compression Devices Ordered  Code Status: Level 3 - DNAR and DNI  Discussion with family: Updated  (daughter) at bedside  Anticipated Length of Stay: Patient will be admitted on an inpatient basis with an anticipated length of stay of greater than 2 midnights secondary to left eye cellulitis requiring IV abx   Total Time for Visit, including Counseling / Coordination of Care: 60 minutes Greater than 50% of this total time spent on direct patient counseling and coordination of care  Chief Complaint: left eye swelling x 10 days     History of Present Illness:  Tina Rod is a 80 y o  female with a PMH of recurring right periorbital cellulitis/abscess +MRSA requiring OR I&D, CLL/lymphoma s/p radiation, Alzheimer's dementia, HTN, afib, CHF brought to ER by daughter c/o left eye erythema, swelling, bloody drainage and subjective fever x10 days  Patient is on day 10 of home PO doxycyline 100mg BID with minimal relief  History was given by daughter at bedside  Daughter states that the doxycyline is to be taken whenever the patient has any increased redness/swelling around the eyes, which normally works, but this time the symptoms have been refractory to the doxycycline  Daughter states that in 12/2019 patient had radiation for CLL/lymphoma which is believed to be the underlying process for the patient's recurring eye cellulitis  Daughter states that patient has decreased PO intake but denies any diarrhea, urinary retention or frequency  Patient lives at home with daughter and is taken care of by her and caregiver   Patient is currently on NC 2L/min which daughter states she is not normally on at home  Daughter notes that she has had fever at home and has been given APAP  She denies any signs of pain, nausea/vomiting, melena, hematochezia, SOB, cough, wheezing, edema  Review of Systems:  Review of Systems   Unable to perform ROS: Dementia        Past Medical and Surgical History:   Past Medical History:   Diagnosis Date    A-fib ([facility] )     Alzheimer disease ([place] [address] )     Anxiety     Arthritis     [facility]est[facility])     Edema     Hypertension     Lymphoma ([place] [address] )     MRSA (methicillin resistant staph aureus) culture positive        Past Surgical History:   Procedure Laterality Date    INCISION AND DRAINAGE ANTERIOR NECK Right 11/3/2019    Procedure: INCISION AND DRAINAGE  (I&D) right periorbital abscess; right dacryocystorhinostomy/dilation of nasolacrimal duct; Surgeon: Nimisha Jaime MD;  [facility]: [facility];  Service: ENT    NASAL/SINUS ENDOSCOPY Bilateral 11/3/2019    Procedure: Bilateral nasal endoscopy, right anterior ethmoidectomy, image guidance ;  Surgeon: Nimisha Jaime MD;  Location: [facility];  Service: ENT       Meds/Allergies:  Prior to Admission medications    Medication Sig Start Date End Date Taking?  Authorizing Provider   fluvoxaMINE (LUVOX) 100 mg tablet Take 100 mg by mouth daily   Yes Historical Provider, MD   furosemide (LASIX) 40 mg tablet Take 40 mg by mouth 2 (two) times a day   Yes Historical Provider, MD   MIRTAZAPINE PO Take 30 mg by mouth daily    Yes Historical Provider, MD   potassium chloride (MICRO-K) 10 MEQ CR capsule Take 20 mEq by mouth daily   Yes Historical Provider, MD   acetaminophen (TYLENOL) 650 mg CR tablet Take 1 tablet (650 mg total) by mouth every 8 (eight) hours as needed for mild pain 9/24/19   Deneen Rushing, DO   bacitracin ophthalmic ointment Administer to the right eye 2 (two) times a day  Patient not taking: Reported on 7/6/2021 11/6/19   Cecelia Magallanes MD   metoprolol succinate (TOPROL-XL) 50 mg 24 hr tablet Take 50 mg by mouth daily    Historical Provider, MD   METOPROLOL TARTRATE PO Take 50 mg by mouth daily   Patient not taking: Reported on 7/6/2021    Historical Provider, MD     I have reviewed home medications with patient family member  Allergies: Allergies   Allergen Reactions    Aspirin     Caffeine - Food Allergy     Ciprofloxacin     Crab (Diagnostic) - Food Allergy     Shellfish-Derived Products - Food Allergy     Strawberry C [Ascorbate - Food Allergy]     Tomato - Food Allergy        Social History:  Marital Status:    Patient Pre-hospital Living Situation: With other family member: daughter  Patient Pre-hospital Level of Mobility: non-ambulatory/bed bound  Patient Pre-hospital Diet Restrictions: mechanical soft with thin liquids, sometimes thickened liquids  Substance Use History:   Social History     Substance and Sexual Activity   Alcohol Use Never     Social History     Tobacco Use   Smoking Status Never Smoker   Smokeless Tobacco Never Used     Social History     Substance and Sexual Activity   Drug Use Never       Family History:  History reviewed  No pertinent family history  Physical Exam:     Vitals:   Blood Pressure: (!) 113/39 (07/06/21 0217)  Pulse: (!) 46 (07/06/21 0217)  Temperature: 98 7 °F (37 1 °C) (07/05/21 1724)  Temp Source: Axillary (07/05/21 1724)  Respirations: 18 (07/06/21 0001)  Height: 5' 4" (162 6 cm) (07/05/21 1724)  Weight - Scale: 56 kg (123 lb 7 3 oz) (07/05/21 1724)  SpO2: 98 % (07/06/21 0217)    Physical Exam  Constitutional:       General: She is not in acute distress  Appearance: She is not toxic-appearing or diaphoretic  HENT:      Head: Normocephalic and atraumatic  Mouth/Throat:      Mouth: Mucous membranes are dry  Eyes:      General: No scleral icterus  Conjunctiva/sclera: Conjunctivae normal       Pupils: Pupils are equal, round, and reactive to light        Comments: Left eye with erythema on medial aspect with TTP with dried bloody drainage  No conjunctival injection      Cardiovascular:      Rate and Rhythm: Bradycardia present  Heart sounds: Normal heart sounds  Pulmonary:      Breath sounds: Normal breath sounds  No wheezing, rhonchi or rales  Abdominal:      General: Abdomen is flat  Bowel sounds are normal       Palpations: Abdomen is soft  Tenderness: There is no guarding  Musculoskeletal:      Right lower leg: No edema  Left lower leg: No edema  Skin:     General: Skin is warm and dry  Capillary Refill: Capillary refill takes less than 2 seconds  Neurological:      Mental Status: Mental status is at baseline  She is disoriented  Comments: Limited neuro exam secondary to Alzheimer's dementia                 Additional Data:     Lab Results:  Results from last 7 days   Lab Units 07/05/21  1805   WBC Thousand/uL 21 32*   HEMOGLOBIN g/dL 12 1   HEMATOCRIT % 38 5   PLATELETS Thousands/uL 125*   LYMPHO PCT % 74*   MONO PCT % 4   EOS PCT % 2     Results from last 7 days   Lab Units 07/05/21  1805   SODIUM mmol/L 156*   POTASSIUM mmol/L 3 6   CHLORIDE mmol/L 118*   CO2 mmol/L 32   BUN mg/dL 33*   CREATININE mg/dL 1 23   ANION GAP mmol/L 6   CALCIUM mg/dL 8 8   GLUCOSE RANDOM mg/dL 103                 Results from last 7 days   Lab Units 07/05/21  1938   LACTIC ACID mmol/L 0 7       Imaging: Reviewed radiology reports from this admission including: CT facial bone  CT facial bones with contrast   Final Result by To Richardson MD (07/05 2032)      1  Subcutaneous inflammation in the left nasal region and nasolacrimal fold, contiguous with left ethmoid air cell opacification via focal dehiscence of the medial wall of the left orbit and lamina papyracea, suggesting suggesting sinogenic source of    cellulitis  No evidence of abscess  2   Left nasal inflammation extends into the anteromedial aspect of the left orbit    Focal dehiscence of the floor of the left orbit without evidence of direct extension of inflammation  No retrobulbar inflammation or subperiosteal abscess  3   Extensive paranasal sinus disease  4   Recommend ENT and ophthalmology consultation  The study was marked in [facility] for immediate notification  Workstation performed: [id number]             EKG and Other Studies Reviewed on Admission:   · EKG: No EKG obtained  ** Please Note: This note has been constructed using a voice recognition system   **

## 2021-07-06 NOTE — EMTALA/ACUTE CARE TRANSFER
[address] [facility] [address]  [place]  [place] [address]  Dept: [phone]      ACUTE CARE TRANSFER CONSENT    NAME Emiliano Warren                                         1925                              MRN [id number]    I have been informed of my rights regarding examination, treatment, and transfer   by Dr Emelina Gomes MD    Benefits:  None- Patient preference    Risks:  Death      Transfer Request:  I acknowledge that my medical condition has been evaluated and explained to me by the treating physician or other qualified medical person and/or my attending physician who has recommended and offered to me further medical examination and treatment  I understand the Hospital's obligation with respect to the treatment and stabilization of my medical condition  I nevertheless request to be transferred  I release the Hospital, the doctor, and any other persons caring for me from all responsibility or liability for any injury or ill effects that may result from my transfer and agree to accept all responsibility for the consequences of my choice to transfer, rather than receive stabilizing treatment at the Hospital  I understand that because the transfer is my request, my insurance may not provide reimbursement for the services  The Hospital will assist and direct me and my family in how to make arrangements for transfer, but the hospital is not liable for any fees charged by the transport service  In spite of this understanding, I refuse to consent to further medical examination and treatment which has been offered to me, and request transfer to    I authorize the performance of emergency medical procedures and treatments upon me in both transit and upon arrival at the receiving facility  Additionally, I authorize the release of any and all medical records to the receiving facility and request they be transported with me, if possible      I authorize the performance of emergency medical procedures and treatments upon me in both transit and upon arrival at the receiving facility  Additionally, I authorize the release of any and all medical records to the receiving facility and request they be transported with me, if possible  I understand that the safest mode of transportation during a medical emergency is an ambulance and that the Hospital advocates the use of this mode of transport  Risks of traveling to the receiving facility by car, including absence of medical control, life sustaining equipment, such as oxygen, and medical personnel has been explained to me and I fully understand them  (MICHELEL CORRECT BOX BELOW)  [  ]  I consent to the stated transfer and to be transported by ambulance/helicopter  [  ]  I consent to the stated transfer, but refuse transportation by ambulance and accept full responsibility for my transportation by car  I understand the risks of non-ambulance transfers and I exonerate the Hospital and its staff from any deterioration in my condition that results from this refusal     X___________________________________________    DATE  21  TIME________  Signature of patient or legally responsible individual signing on patient behalf           RELATIONSHIP TO PATIENT_________________________          Provider Certification    NAME Briana Mendoza                                        [facility] 1925                              MRN [id number]    A medical screening exam was performed on the above named patient    Based on the examination:    Condition Necessitating Transfer Left eye cellulitis/lymphoma    Patient Condition:  [address]    Reason for Transfer:  Patient's family preference    Transfer Requirements: Facility     · Space available and qualified personnel available for treatment as acknowledged by    · Agreed to accept transfer and to provide appropriate medical treatment as acknowledged by          · Appropriate medical records of the examination and treatment of the patient are provided at the time of transfer   [facility], [address] _______  · Transfer will be performed by qualified personnel from    and appropriate transfer equipment as required, including the use of necessary and appropriate life support measures  Provider Certification: I have examined the patient and explained the following risks and benefits of being transferred/refusing transfer to the patient/family:         Based on these reasonable risks and benefits to the patient and/or the unborn child(lina), and based upon the information available at the time of the patients examination, I certify that the medical benefits reasonably to be expected from the provision of appropriate medical treatments at another medical facility outweigh the increasing risks, if any, to the individuals medical condition, and in the case of labor to the unborn child, from effecting the transfer      X____________________________________________ DATE 07/06/21        TIME_______      ORIGINAL - SEND TO MEDICAL RECORDS   COPY - SEND WITH PATIENT DURING TRANSFER

## 2021-07-06 NOTE — ASSESSMENT & PLAN NOTE
· Present on admission   · Baseline creatinine: 0 8-1 0   · Creatine today 1 23  · Likely pre-renal in setting of dehydration and poor PO intake   · Gentle IVF hydration with D5W  · Hold PO lasix and potassium supplementation   · Avoid hypotension/nephrotoxins   · Urinary retention protocol, bladder scan

## 2021-07-06 NOTE — ASSESSMENT & PLAN NOTE
· Patient follows with otolaryngology as outpatient   · Hx of radiation for right eye lymph node 12/2019   · Upon review of prior records, it is suggested that her CLL/lymphoma diagnosis is the underlying process for her recurring periorbital cellulitis infections which requires close monitoring by ENT or Dr Michelle Pendleton with otolaryngology to optimize healing of the nasolacrimal duct system/sinonasal passages   · ENT consult placed- appreciate further recommendations

## 2021-07-06 NOTE — ASSESSMENT & PLAN NOTE
79 y/o woman with pmhx of recurring right periorbital cellulitis/abscess +MRSA requiring OR I&D, CLL/lymphoma s/p radiation, Alzheimer's dementia, HTN, afib, CHF brought to ER by daughter c/o left eye erythema, swelling, bloody drainage and subjective fever x10 days  Patient is on day 10 of home PO doxycyline 100mg BID  · Currently afebrile but with leukocytosis   · Per daughter, subjective fever at home   · CT facial bones with contrast:   · Subcutaneous inflammation in the left nasal region and nasolacrimal fold, contiguous with left ethmoid air cell opacification via focal dehiscence of the medial wall of the left orbit and lamina papyracea, suggesting suggesting sinogenic source of cellulitis  No evidence of abscess  · Left nasal inflammation extends into the anteromedial aspect of the left orbit  Focal dehiscence of the floor of the left orbit without evidence of direct extension of inflammation  No retrobulbar inflammation or subperiosteal abscess    · Extensive paranasal sinus disease   · Culture of left eye drainage: pending   · Empirically treat with IV Vancomycin given hx of prior MRSA infection and abscess of right eye requiring OR I&D 11/2019   · ENT and ID consults placed- appreciate input   · Blood cultures x2: pending   · Procalcitonin: pending   · Lactic acid: negative   · Monitor fever curve/hemodynamics

## 2021-07-06 NOTE — PROGRESS NOTES
Vancomycin Assessment    Mynor Ga is a 80 y o  female who is currently receiving vancomycin vancomycin 750mg q12h for skin-soft tissue infection     Relevant clinical data and objective history reviewed:  Creatinine   Date Value Ref Range Status   07/05/2021 1 23 0 60 - 1 30 mg/dL Final     Comment:     Standardized to IDMS reference method   11/06/2019 0 86 0 60 - 1 30 mg/dL Final     Comment:     Standardized to IDMS reference method   11/05/2019 1 03 0 60 - 1 30 mg/dL Final     Comment:     Standardized to IDMS reference method     BP (!) 113/48   Pulse (!) 44   Temp (!) 97 4 °F (36 3 °C)   Resp 18   Ht 5' 4" (1 626 m)   Wt 56 kg (123 lb 7 3 oz)   LMP  (LMP Unknown)   SpO2 100%   BMI 21 19 kg/m²   No intake/output data recorded  Lab Results   Component Value Date/Time    BUN 33 (H) 07/05/2021 06:05 PM    WBC 21 32 (H) 07/05/2021 06:05 PM    HGB 12 1 07/05/2021 06:05 PM    HCT 38 5 07/05/2021 06:05 PM     (H) 07/05/2021 06:05 PM     (L) 07/05/2021 06:05 PM     Temp Readings from Last 3 Encounters:   07/06/21 (!) 97 4 °F (36 3 °C)   11/06/19 97 9 °F (36 6 °C)   11/03/19 97 7 °F (36 5 °C) (Tympanic)     Vancomycin Days of Therapy: 1    Assessment/Plan  The patient is currently on vancomycin utilizing pulse dosing based on actual body weight  Baseline risks associated with therapy include: pre-existing renal impairment, advanced age, and dehydration  The patient is currently receiving vancomycin 750mg q12h and after clinical evaluation will be changed to vancomycin 750mg daily prn forrrandom  trough level <20  [facility] will also follow closely for s/sx of nephrotoxicity, infusion reactions, and appropriateness of therapy  BMP and CBC will be ordered per protocol  Plan for a random trough at approximately 2100 7/6  Due to infection severity, will target a trough of 15-20 (appropriate for most indications)     Pharmacy will continue to follow the patients culture results and clinical progress daily      Umer Galvez, Pharmacist

## 2021-07-06 NOTE — CONSULTS
Consultation - Infectious Disease   Unique Bhandari 80 y o  female MRN: [id number]  Unit/Bed#: 2050 [facility] Encounter: [id number]      IMPRESSION & RECOMMENDATIONS:   1  Left periorbital cellulitis-verses mass with destructive changes in the setting of CLL/lymphoma  In the the past the patient has had MRSA abscess and has had recurrent bouts of drainage and erythema around the eye that all seemed to respond to doxycycline suggesting that MRSA is once again playing a role  There was reported erythema although this seems to have resolved and I do not see any drainage of this time  -continue vancomycin for now at current dose  -pharmacy follow-up for vancomycin trough management  -check MRI of the sinuses and orbits  -close ENT follow-up  -eventual endoscopic surgery the sinuses for exoneration and biopsy   -recheck CBC with diff and BMP  -serial exams    2  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   This is what is felt to be causing the recurrent infection  She has been followed by ENT and previously has been treated with radiation  -workup and management as above  -likely transfer to her primary center of care for additional management    3  Acute kidney injury-suspect pre renal issue  No other clear source appreciated  The creatinine is relatively stable   -volume management  -dose adjust antibiotics as needed  -recheck BMP    4  Leukocytosis-suspect all related to the patient's CLL/lymphoma with lymphocytes predominating    5   Dementia-patient currently lives with her daughter and currently is nonverbal unable to the provide any history   -level of care issues would be appropriate    Have discussed the above management plan in detail with the primary service    Extensive review of the medical records in epic including review of the notes, radiographs, and laboratory results     HISTORY OF PRESENT ILLNESS:  Reason for Consult:  Left periorbital cellulitis  HPI: Unique Bhandari is a 80y o  year old female with 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, recurrent left periorbital cellulitis, Alzheimer dementia, atrial fibrillation admitted to [facility] with the development of left periorbital swelling and redness and drainage we are asked to assist with antibiotic management  The patient has had a previous history of bouts of severe periorbital cellulitis in the past and in 2019 actually had a abscess drain which grew MRSA  She had recurrent bouts of periorbital cellulitis since that time which has responded antibiotics each time  This is all in the setting of getting treatment for her CLL with radiation therapy by report  By the family's report the daughter began noticing some left eye erythema, swelling, and drainage from the medial aspect of the left eye as well as subjective fevers  This is all been occurring over the last 10 days  Patient has been on a 10 day course of oral doxycycline without significant improvement  In the past the patient has responded to the doxycycline  Because of these findings the patient was brought to the ER for further evaluation  In the emergency department the patient was found to be afebrile but with a notable leukocytosis felt secondary to the CLL  The patient had blood cultures obtained and was started on vancomycin admitted for further management  Imaging of the sinuses revealed inflammatory changes versus tumor with destructive abnormalities noted in and around the orbit  Patient was seen by ENT who is starting the patient on steroids in addition to the ongoing antibiotics  They recommended endoscopic sinus surgery again for exoneration and biopsy  However the patient's family has requested transfer to [facility]  During the patient's brief hospital stay she has remained afebrile and hemodynamically stable    She is currently nonverbal and therefore the entire history is through chart review and discussion with the primary service    REVIEW OF SYSTEMS:  A complete review of systems is negative other than that noted in the HPI  PAST MEDICAL HISTORY:  Past Medical History:   Diagnosis Date    A-fib ([facility] )     Alzheimer disease ([place] [address] )     Anxiety     Arthritis     CHF (congestive heart failure) (HCC)     Edema     Hypertension     Lymphoma ([place] [address] )     MRSA (methicillin resistant staph aureus) culture positive      Past Surgical History:   Procedure Laterality Date    INCISION AND DRAINAGE ANTERIOR NECK Right 11/3/2019    Procedure: INCISION AND DRAINAGE  (I&D) right periorbital abscess; right dacryocystorhinostomy/dilation of nasolacrimal duct; Surgeon: Lazara Bland MD;  [facility];  Service: ENT    NASAL/SINUS ENDOSCOPY Bilateral 11/3/2019    Procedure: Bilateral nasal endoscopy, right anterior ethmoidectomy, image guidance ;  Surgeon: Lazara Bland MD;  [facility];  Service: ENT       FAMILY HISTORY:  Non-contributory    SOCIAL HISTORY:  Social History   Social History     Substance and Sexual Activity   Alcohol Use Never     Social History     Substance and Sexual Activity   Drug Use Never     Social History     Tobacco Use   Smoking Status Never Smoker   Smokeless Tobacco Never Used       ALLERGIES:  Allergies   Allergen Reactions    Aspirin     Caffeine - Food Allergy     Ciprofloxacin     Crab (Diagnostic) - Food Allergy     Shellfish-Derived Products - Food Allergy     Strawberry C [Ascorbate - Food Allergy]     Tomato - Food Allergy        MEDICATIONS:  All current active medications have been reviewed    Antibiotics:  Vancomycin 2    PHYSICAL EXAM:  Temp:  [97 4 °F (36 3 °C)-98 7 °F (37 1 °C)] 97 5 °F (36 4 °C)  HR:  [44-56] 46  Resp:  [16-18] 16  BP: (109-144)/(39-58) 144/58  SpO2:  [92 %-100 %] 99 %  Temp (24hrs), Av 9 °F (36 6 °C), Min:97 4 °F (36 3 °C), Max:98 7 °F (37 1 °C)  Current: Temperature: 97 5 °F (36 4 °C)    Intake/Output Summary (Last 24 hours) at 7/6/2021 1318  Last data filed at 7/6/2021 0222  Gross per 24 hour   Intake 1740 ml   Output --   Net 1740 ml       General Appearance:  Elderly, debilitated, nonverbal, nontoxic and in no acute distress   Head:  Normocephalic, without obvious abnormality, atraumatic   Eyes:  Very mild left periorbital swelling without erythema or drainage at this time   Nose: Nares normal, mucosa normal, no drainage   Throat: Oropharynx moist without lesions   Neck: Supple, symmetrical, no adenopathy, no tenderness/mass/nodules   Back:   Symmetric, no curvature, ROM normal, no CVA tenderness   Lungs:   Decreased breath sounds bilaterally, respirations unlabored   Chest Wall:  No tenderness or deformity   Heart:  Bradycardic; no murmur, rub or gallop   Abdomen:   Soft, non-tender, non-distended, positive bowel sounds    Extremities: No cyanosis, clubbing or edema   Skin: No rashes or lesions  No draining wounds noted  Lymph nodes: Cervical, supraclavicular nodes normal   Neurologic: Alert and oriented times 3, extremity strength 5/5 and symmetric       LABS, IMAGING, & OTHER STUDIES:  Lab Results:  I have personally reviewed pertinent labs  Results from last 7 days   Lab Units 07/06/21  0821 07/05/21  1805   WBC Thousand/uL 20 14* 21 32*   HEMOGLOBIN g/dL 11 8 12 1   PLATELETS Thousands/uL 119* 125*     Results from last 7 days   Lab Units 07/06/21  0821 07/05/21  1805   SODIUM mmol/L 150* 156*   POTASSIUM mmol/L 4 5 3 6   CHLORIDE mmol/L 113* 118*   CO2 mmol/L 30 32   BUN mg/dL 25 33*   CREATININE mg/dL 1 20 1 23   EGFR ml/min/1 73sq m 39 37   CALCIUM mg/dL 8 6 8 8     Results from last 7 days   Lab Units 07/05/21  2351 07/05/21  1938   BLOOD CULTURE   --  Received in Microbiology Lab  Culture in Progress  Received in Microbiology Lab  Culture in Progress     GRAM STAIN RESULT  Rare Polys  No organisms seen  --      Results from last 7 days   Lab Units 07/06/21  5329 PROCALCITONIN ng/ml <0 05                   Imaging Studies:     CT facial bones-left ethmoid air cell opacification with focal dehiscence of the medial wall left orbit suggesting sign hygienic source of inflammatory changes without abscess  Left nasal inflammation in the anterior medial aspect of the left orbit  Focal dehiscence of the floor of the left orbit without evidence of direct extension  No abscess      Images personally reviewed by me in PACS

## 2021-07-06 NOTE — ASSESSMENT & PLAN NOTE
Wt Readings from Last 3 Encounters:   07/05/21 56 kg (123 lb 7 3 oz)   11/06/19 76 8 kg (169 lb 6 4 oz)   11/03/19 85 3 kg (188 lb)       · No clinical evidence of acute exacerbation   · Patient appears to be dry, no signs of fluid overload   · Hold PO lasix and K supplementation   · I/O   · Daily weights

## 2021-07-06 NOTE — ASSESSMENT & PLAN NOTE
· Patient currently lives at home with daughter Chris Christina) and is taken care of by her and caregiver   · Continue fluvoxamine 100mg and mirtazapine 30mg daily   · PT/OT consult: pending   · Speech eval: pending

## 2021-07-06 NOTE — CONSULTS
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  Plan:  Add decadron to medications, 10mg IV Q8h x 2 doses to help decrease adjacent inflammation  MRI of the sinuses/facial bones with contrast will better help distinguish infection from tumor  The patient may ultimately need endoscopic sinus surgery again to exenterate the sinuses and biopsy the area  I discussed this with the patient's POA, her daughter-in-law, and she would like the patient transferred to [facility], because all her regular doctors (Dr Charlotte Nunez) and her network are located there  I think this is reasonable as her immediate infection appears to have deffervesced  History of Present Illness   Physician Requesting Consult: Brianna Reyes MD  Reason for Consult / Principal Problem: left eye infection  HPI: Hank Luz is a 80y o  year old female who presents with swelling, erythema, and drainage from the left medial canthus and nasolabial fold area  She has a recurring history of the same over the past two years  She was last treated in our network in November 2019  At that time, endoscopic sinus surgery was done to relieve her infection, and 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  She was treated at [facility] with local XRT  Since then, she has had several episodes of similar periorbital infection, which have responded to oral doxycycline    This time, however, she did not respond to oral doxycycline  Consults    Review of Systems    Historical Information   Past Medical History:   Diagnosis Date    A-fib ([facility] )     Alzheimer disease ([facility] )     Anxiety     Arthritis     [facility]) [facility])     Edema     Hypertension     Lymphoma ([facility] )     MRSA (methicillin resistant staph aureus) culture positive      Past Surgical History:   Procedure Laterality Date    INCISION AND DRAINAGE ANTERIOR NECK Right 11/3/2019    Procedure: INCISION AND DRAINAGE  (I&D) right periorbital abscess; right dacryocystorhinostomy/dilation of nasolacrimal duct; Surgeon: Nancy Linder MD;  [facility];  Service: ENT    NASAL/SINUS ENDOSCOPY Bilateral 11/3/2019    Procedure: Bilateral nasal endoscopy, right anterior ethmoidectomy, image guidance ;  Surgeon: Nancy Linder MD;  [facility];  Service: ENT     Social History   Social History     Substance and Sexual Activity   Alcohol Use Never     Social History     Substance and Sexual Activity   Drug Use Never     Social History     Tobacco Use   Smoking Status Never Smoker   Smokeless Tobacco Never Used     Family History: non-contributory    Meds/Allergies   all current active meds have been reviewed    Allergies   Allergen Reactions    Aspirin     Caffeine - Food Allergy     Ciprofloxacin     Crab (Diagnostic) - Food Allergy     Shellfish-Derived Products - Food Allergy     Strawberry C [Ascorbate - Food Allergy]     Tomato - Food Allergy        Objective       Intake/Output Summary (Last 24 hours) at 7/6/2021 1051  Last data filed at 7/6/2021 0222  Gross per 24 hour   Intake 1740 ml   Output --   Net 1740 ml       Invasive Devices     Peripheral Intravenous Line            Peripheral IV 07/05/21 Left Arm <1 day          Drain            External Urinary Catheter <1 day                Physical Exam  Neck: no thyromegaly nor adenopathy    Nose: DNS, mild erythema and scant edema of the left nasolabial fold area with <1cm mass vs swelling palpable in the left medial canthal area  Oropharynx: unremarkable  Ears: unremarkable  Lab Results: I have personally reviewed pertinent lab results  Imaging Studies: I have personally reviewed pertinent reports  EKG, Pathology, and Other Studies: I have personally reviewed pertinent reports

## 2021-07-06 NOTE — PHYSICAL THERAPY NOTE
07/06/21 0916   Note Type   Note type Screen  (pt dependent as per OT screen, no skilled PT needs   DC PT)   Licensure   [place] License Number  Patricia Deleon PT [id number]

## 2021-07-06 NOTE — PROGRESS NOTES
Vancomycin IV Pharmacy-to-Dose Consultation    Lucille Frankel is a 80 y o  female who is currently receiving Vancomycin IV with management by the Pharmacy Consult service  Assessment/Plan:  The patient was reviewed  Renal function is stable and no signs or symptoms of nephrotoxicity and/or infusion reactions were documented in the chart  Based on todays assessment, continue current vancomycin (day # 2, patient had loading dose of 750 mg 7-5-21 at 21:13) dosing of 750 mg q24h, with a plan for trough to be drawn at 21:00 on 7-6-21  We will continue to follow the patients culture results and clinical progress daily      Maggi Hawk, Pharmacist

## 2021-07-06 NOTE — SPEECH THERAPY NOTE
Speech-Language Pathology Bedside Swallow Evaluation      Patient Name: Edd PLATA Date: 7/6/2021     Problem List  Principal Problem:    Periorbital cellulitis of left eye  Active Problems:    Hypertension    Chronic [facility] failure ([facility] )    A-fib ([place] [address] )    History of MRSA infection    Dementia ([place] [address] )    MINI (acute kidney injury) ([place] [address] )    Hypernatremia    CLL (chronic lymphocytic leukemia) ([place] [address] )      Past Medical History  Past Medical History:   Diagnosis Date    A-fib ([place] [address] )     Alzheimer disease ([place] [address] )     Anxiety     Arthritis     [facility]     Edema     Hypertension     Lymphoma ([place] [address] )     MRSA (methicillin resistant staph aureus) culture positive        Past Surgical History  Past Surgical History:   Procedure Laterality Date    INCISION AND DRAINAGE ANTERIOR NECK Right 11/3/2019    Procedure: INCISION AND DRAINAGE  (I&D) right periorbital abscess; right dacryocystorhinostomy/dilation of nasolacrimal duct;   Surgeon: Nimisha Jaime MD;  [facility];  Service: ENT    NASAL/SINUS ENDOSCOPY Bilateral 11/3/2019    Procedure: Bilateral nasal endoscopy, right anterior ethmoidectomy, image guidance ;  Surgeon: Nimisha Jaime MD;  [facility];  Service: ENT       Summary   Pt presented with functional appearing oral and pharyngeal stage swallowing skills with limited materials administered today (puree, mechanical soft, nectar thick and thin liquid)    Recommended Diet: mechanically altered/level 2 diet and nectar thick liquids (until family consutled and pt seen with additional thin liquids)  Recommended Form of Meds: crushed with puree   Aspiration precautions and swallowing strategies: upright posture, only feed when fully alert, slow rate of feeding, small bites/sips and small more frequent feedings  Other Recommendations: Continue frequent oral care        Current Medical Status  Edd Thurman is a 80 y o  female with a PMH of recurring right periorbital cellulitis/abscess +MRSA requiring OR I&D, CLL/lymphoma s/p radiation, Alzheimer's dementia, HTN, afib, CHF brought to ER by daughter c/o left eye erythema, swelling, bloody drainage and subjective fever x10 days  Patient is on day 10 of home PO doxycyline 100mg BID with minimal relief  Diagnoses (see above)  Given h/o dementia, SLP Swallowing Evaluation requested (completed bedside)  Current Precautions:  Fall     Allergies:  Crab, Shellfish, Strawberry, Tomato, caffeine    Past medical history:  Please see H&P for details    Special Studies:  CT of facial bones:   1  Subcutaneous inflammation in the left nasal region and nasolacrimal fold, contiguous with left ethmoid air cell opacification via focal dehiscence of the medial wall of the left orbit and lamina papyracea, suggesting suggesting sinogenic source of   cellulitis  No evidence of abscess  2   Left nasal inflammation extends into the anteromedial aspect of the left orbit  Focal dehiscence of the floor of the left orbit without evidence of direct extension of inflammation  No retrobulbar inflammation or subperiosteal abscess  3   Extensive paranasal sinus disease  4   Recommend ENT and ophthalmology consultation  Social/Education/Vocational Hx:  Patient lives at home with daughter and is taken care of by her and caregiver  Swallow Information   Current Risks for Dysphagia & Aspiration: h/o dementia  Current Diet: mechanically altered/level 2 diet and nectar thick liquids   Baseline Diet: unknown at time of evaluation (was unable to reach family this am)    Baseline Assessment   Behavior/Cognition: waxing and waning arousal level  Speech/Language Status: able to follow commands on occasion in context and limited verbal output  Patient Positioning: upright in bed  Pain Status/Interventions/Response to Interventions:  No report of or nonverbal indications of pain        Swallow Mechanism Exam  Labial/Facial: symmetrical appearing at rest  Lingual: protruded midline (not following other commands to assess ROM)  Velum: unable to visualize  Mandible: adequate ROM  Dentition: adequate  Vocal quality:clear for limited speech produced at low volume   Volitional Cough: unable to initiate volitional cough   Respiratory Status: on RA       Consistencies Assessed and Performance   Consistencies Administered: thin liquids, nectar thick, puree and mechanical soft solids    Oral Stage: WFL c v soft food and individual sips of liquid  Mastication was adequate with the materials administered today  Bolus formation and transfer were functional with no significant oral residue noted  No overt s/s reduced oral control  Pharyngeal Stage: WFL suspected with limited amounts of conservative materials    Swallow Mechanics:  Swallowing initiation appeared prompt  Laryngeal rise was palpated and judged to be within functional limits  No coughing, throat clearing, change in vocal quality or respiratory status noted today  Esophageal Concerns: frequent belching (presume presbyesophagus and that pt benefits of small frequent feedings    Strategies and Efficacy: fed small amounts slowly over time    Summary and Recommendations (see above)    Results Reviewed with: RN and left message for family     Treatment Recommended: briefly to ensure tolerance of least restrictive diet     Frequency of treatment: x2    Patient Stated Goal: unable to state    Dysphagia LTG  -Patient will demonstrate safe and effective oral intake (without overt s/s significant oral/pharyngeal dysphagia including s/s penetration or aspiration) for the highest appropriate diet level       Short Term Goals:  -Pt will tolerate Dysphagia 2/mechanical soft foods, nectar thick and thin liquid with no significant s/s oral or pharyngeal dysphagia across 1-3 diagnostic session/s    -Patients caregiver will demonstrate understanding, recall and use of recommended diet and (prompting for use of) compensatory strategies       Ej Bae [id number] [phone]  Available via [facility] Text     -

## 2021-07-07 LAB
ANION GAP SERPL CALCULATED.3IONS-SCNC: 8 MMOL/L (ref 4–13)
BUN SERPL-MCNC: 28 MG/DL (ref 5–25)
CALCIUM SERPL-MCNC: 8.3 MG/DL (ref 8.3–10.1)
CHLORIDE SERPL-SCNC: 108 MMOL/L (ref 100–108)
CO2 SERPL-SCNC: 29 MMOL/L (ref 21–32)
CREAT SERPL-MCNC: 1.13 MG/DL (ref 0.6–1.3)
ERYTHROCYTE [DISTWIDTH] IN BLOOD BY AUTOMATED COUNT: 14.1 % (ref 11.6–15.1)
GFR SERPL CREATININE-BSD FRML MDRD: 41 ML/MIN/1.73SQ M
GLUCOSE SERPL-MCNC: 165 MG/DL (ref 65–140)
HCT VFR BLD AUTO: 35.8 % (ref 34.8–46.1)
HGB BLD-MCNC: 11.3 G/DL (ref 11.5–15.4)
MCH RBC QN AUTO: 35.5 PG (ref 26.8–34.3)
MCHC RBC AUTO-ENTMCNC: 31.6 G/DL (ref 31.4–37.4)
MCV RBC AUTO: 113 FL (ref 82–98)
PLATELET # BLD AUTO: 116 THOUSANDS/UL (ref 149–390)
PMV BLD AUTO: 11.9 FL (ref 8.9–12.7)
POTASSIUM SERPL-SCNC: 4.2 MMOL/L (ref 3.5–5.3)
PROCALCITONIN SERPL-MCNC: <0.05 NG/ML
RBC # BLD AUTO: 3.18 MILLION/UL (ref 3.81–5.12)
SODIUM SERPL-SCNC: 145 MMOL/L (ref 136–145)
VANCOMYCIN TROUGH SERPL-MCNC: 21.1 UG/ML (ref 10–20)
WBC # BLD AUTO: 16.34 THOUSAND/UL (ref 4.31–10.16)

## 2021-07-07 PROCEDURE — 84145 PROCALCITONIN (PCT): CPT | Performed by: PHYSICIAN ASSISTANT

## 2021-07-07 PROCEDURE — 92526 ORAL FUNCTION THERAPY: CPT

## 2021-07-07 PROCEDURE — 80202 ASSAY OF VANCOMYCIN: CPT | Performed by: INTERNAL MEDICINE

## 2021-07-07 PROCEDURE — 99233 SBSQ HOSP IP/OBS HIGH 50: CPT | Performed by: INTERNAL MEDICINE

## 2021-07-07 PROCEDURE — 80048 BASIC METABOLIC PNL TOTAL CA: CPT | Performed by: INTERNAL MEDICINE

## 2021-07-07 PROCEDURE — 85027 COMPLETE CBC AUTOMATED: CPT | Performed by: INTERNAL MEDICINE

## 2021-07-07 PROCEDURE — 99232 SBSQ HOSP IP/OBS MODERATE 35: CPT | Performed by: NURSE PRACTITIONER

## 2021-07-07 RX ADMIN — HEPARIN SODIUM 5000 UNITS: 5000 INJECTION INTRAVENOUS; SUBCUTANEOUS at 22:13

## 2021-07-07 RX ADMIN — HEPARIN SODIUM 5000 UNITS: 5000 INJECTION INTRAVENOUS; SUBCUTANEOUS at 15:20

## 2021-07-07 RX ADMIN — VANCOMYCIN HYDROCHLORIDE 750 MG: 750 INJECTION, SOLUTION INTRAVENOUS at 22:13

## 2021-07-07 RX ADMIN — MIRTAZAPINE 30 MG: 15 TABLET, FILM COATED ORAL at 10:28

## 2021-07-07 RX ADMIN — HEPARIN SODIUM 5000 UNITS: 5000 INJECTION INTRAVENOUS; SUBCUTANEOUS at 06:13

## 2021-07-07 RX ADMIN — VANCOMYCIN HYDROCHLORIDE 750 MG: 750 INJECTION, SOLUTION INTRAVENOUS at 10:30

## 2021-07-07 RX ADMIN — FLUVOXAMINE MALEATE 100 MG: 100 TABLET ORAL at 10:28

## 2021-07-07 NOTE — CASE MANAGEMENT
LOS - 2 days    Call received from Elmo De Leon in PACS regarding requested transfer to [facility]  Family had requested transfer because pt's PCP and specialists are in the [facility] system  Per Ingrid Hernandez in PACS she spoke with Nikos Hickman, Director of Care Coordination at [facility], about transfer  Per Maciej Patella stated that she would a signed letter from family that they are willing to accept all financial responsibility for her transfer and care at [facility] as well as all pt's medical records faxed to her  After both are received case will be reviewed and transfer would be based on availability  Ingrid Hernandez requested SW call daughter to explain above  SW placed call to daughter, Brianne Dooley  During initial conversation daughter expressed that she is very upset that [address] is not transferred pt as requested  SW explained that [address] physicians and PACS are trying to facilitate transfer however [facility] has not accepted pt  Per daughter pt's PCP, Dr Spencer Schwartz, told her she would accept pt  SW explained the need for financial responsibility letter and medical records  Daughter stated that she would not be signing a letter like that and that she is very disappointed with [facility]  SW provided daughter with name and department of person at [facility] that PACS has been working with if she wanted to call and discuss situation with her  At this time daughter stated that once pt's IV antibiotic treatment is finished they would like to bring pt home  Per daughter they do not want any invasive tests or surgeries at this time  Discussed goals of making sure pt's needs are met upon discharge and that Freedom of Choice is offered  Per daughter she and her  have been caring for all of pt's needs with the assistance of aides/caregivers for quite some time  They have hospital bed, wheelchair, shower chair and raised toilet seat at home    Per daughter they have all they need to meet pt's needs  Pt has living will and power of  on chart  PCP is Dr Felipe Trent MD  [facility]  No discharge needs expressed or anticipated by daughter at this time  Offered support in future if needed  SW will call daughter with anticipated end date of antibiotics as she requested  Discussed above with CRNP  SW will be available to assist with transition home when ready

## 2021-07-07 NOTE — SPEECH THERAPY NOTE
Speech/Language Pathology Progress Note    Patient Name: Jose Angel Rich  [id number] Date: 7/7/2021      Subjective:  Pt fully alert and with responsive smiling  Objective:  Pt was seen for diagnostic dysphagia therapy to ensure tolerance of current diet (Level 2 [facility] soft/nectar thick liquid) and to assess potential for upgrade to thin liquid  Pt was alert, positioned upright and fed by myself    Pt was assessed with small amounts of pudding, pureed [place] toast custard w/butter/syrup, nectar thick and thin liquid  Mastication or manipulation with puree was of good effort/effective (except 1st bolus of Fr toast in which she required instruction to begin mastication/manipulation)  Bolus formation and transfer extended but effective  Swallow initiation time suspected to be adequate  No coughing, throat clearing, multiple swallows, change in vocal quality noted c po intake today  I spoke with dtr-in-law Ant Marrufo who confirmed that pt is on thin liquid at home  She denied s/s aspiration in pt and reported completing thorough oral care with pt (pt brushed teeth, then d-I-l brushed pt's teeth and then pt rinses mouth and spits)  Assessment:  Pt tolerated soft food, thin liquid with no s/s aspiration  Family confirmed that pt NOT on thick liquids at home and no s/s aspiration with thin liquid at home  Plan/Recommendations:  Please upgrade diet to include thin liquid (continue soft food)  Continue pills in applesauce  No additional recommendations at this time      Nickolas Castelan [id number] [phone]  Available via [facility] Text

## 2021-07-07 NOTE — ASSESSMENT & PLAN NOTE
· Present on admission   · Baseline creatinine: 0 8-1 0   · Creatine today 1 13  · Likely pre-renal in setting of dehydration and poor PO intake   · Gentle IVF hydration with D5W  · Hold PO lasix and potassium supplementation   · Avoid hypotension/nephrotoxins   · Urinary retention protocol, bladder scan

## 2021-07-07 NOTE — PLAN OF CARE
Problem: Potential for Falls  Goal: Patient will remain free of falls  Description: INTERVENTIONS:  - Educate patient/family on patient safety including physical limitations  - Instruct patient to call for assistance with activity   - Consult OT/PT to assist with strengthening/mobility   - Keep Call bell within reach  - Keep bed low and locked with side rails adjusted as appropriate  - Keep care items and personal belongings within reach  - Initiate and maintain comfort rounds  - Make Fall Risk Sign visible to staff  - Offer Toileting every 2 Hours, in advance of need  - Initiate/Maintain bed/ chair alarm  - Obtain necessary fall risk management equipment: walker  - Apply yellow socks and bracelet for high fall risk patients  - Consider moving patient to room near nurses station  Outcome: Progressing     Problem: MOBILITY - ADULT  Goal: Maintain or return to baseline ADL function  Description: INTERVENTIONS:  -  Assess patient's ability to carry out ADLs; assess patient's baseline for ADL function and identify physical deficits which impact ability to perform ADLs (bathing, care of mouth/teeth, toileting, grooming, dressing, etc )  - Assess/evaluate cause of self-care deficits   - Assess range of motion  - Assess patient's mobility; develop plan if impaired  - Assess patient's need for assistive devices and provide as appropriate  - Encourage maximum independence but intervene and supervise when necessary  - Involve family in performance of ADLs  - Assess for home care needs following discharge   - Consider OT consult to assist with ADL evaluation and planning for discharge  - Provide patient education as appropriate  Outcome: Progressing  Goal: Maintains/Returns to pre admission functional level  Description: INTERVENTIONS:  - Perform BMAT or MOVE assessment daily    - Set and communicate daily mobility goal to care team and patient/family/caregiver     - Collaborate with rehabilitation services on mobility goals if consulted  - Perform Range of Motion 3 times a day  - Reposition patient every 2 hours    - Dangle patient 3 times a day  - Stand patient 3 times a day  - Ambulate patient 3 times a day  - Out of bed to chair 3 times a day   - Out of bed for meals 3 times a day  - Out of bed for toileting  - Record patient progress and toleration of activity level   Outcome: Progressing     Problem: Prexisting or High Potential for Compromised Skin Integrity  Goal: Skin integrity is maintained or improved  Description: INTERVENTIONS:  - Identify patients at risk for skin breakdown  - Assess and monitor skin integrity  - Assess and monitor nutrition and hydration status  - Monitor labs   - Assess for incontinence   - Turn and reposition patient  - Assist with mobility/ambulation  - Relieve pressure over bony prominences  - Avoid friction and shearing  - Provide appropriate hygiene as needed including keeping skin clean and dry  - Evaluate need for skin moisturizer/barrier cream  - Collaborate with interdisciplinary team   - Patient/family teaching  - Consider wound care consult   Outcome: Progressing

## 2021-07-07 NOTE — ASSESSMENT & PLAN NOTE
Wt Readings from Last 3 Encounters:   07/07/21 51 7 kg (113 lb 15 7 oz)   11/06/19 76 8 kg (169 lb 6 4 oz)   11/03/19 85 3 kg (188 lb)       · No clinical evidence of acute exacerbation   · Patient appears to be dry, no signs of fluid overload   · Hold PO lasix and K supplementation   · I/O   · Daily weights

## 2021-07-07 NOTE — ASSESSMENT & PLAN NOTE
· Prior admission for right periorbital abscess treated with Doxycyline 11/2019   · Patient was discharged home with mupirocin intranasally given MRSA colonization and infection  · Per daughter, pt has PO doxycyline at home which patient would take as prophylaxis if noticed increased redness/swelling around the eyes   · Patient is on day 10 of PO doxycyline at home with no relief of eye symptoms- stop doxycyline  · IV Vancomycin pending ID consult- appreciate input   · Culture of right eye drainage: pending  · As noted above patient's family indicated that they desire IV antibiotic treatment, when patient is stable wish to bring her home, fell the wishes of her Living Will, no further invasive procedures such as surgery

## 2021-07-07 NOTE — ASSESSMENT & PLAN NOTE
· Suspected in the setting of dehydration and poor PO intake   · Gentle IVF hydration with D5W    · Improving, currently 145  · Recheck in AM

## 2021-07-07 NOTE — ASSESSMENT & PLAN NOTE
· Patient follows with otolaryngology as outpatient   · Hx of radiation for right eye lymph node 12/2019   · Upon review of prior records, it is suggested that her CLL/lymphoma diagnosis is the underlying process for her recurring periorbital cellulitis infections which requires close monitoring by ENT or Dr Iva Mcmahon with otolaryngology to optimize healing of the nasolacrimal duct system/sinonasal passages   · ENT consult placed; initially patient was to be transferred to [facility], however after discussion with family they prefer for IV antibiotic treatment here, once stabilized bring patient home, no further invasive procedures as per patient's wishes/living will

## 2021-07-07 NOTE — PROGRESS NOTES
Vancomycin Assessment    Flores Flores is a 80 y o  female who is currently receiving vancomycin 750 mg iv q 24 hours for skin-soft tissue infection     Relevant clinical data and objective history reviewed:  Creatinine   Date Value Ref Range Status   07/06/2021 1 20 0 60 - 1 30 mg/dL Final     Comment:     Standardized to IDMS reference method   07/05/2021 1 23 0 60 - 1 30 mg/dL Final     Comment:     Standardized to IDMS reference method   11/06/2019 0 86 0 60 - 1 30 mg/dL Final     Comment:     Standardized to IDMS reference method     Vancomycin Rm   Date Value Ref Range Status   07/06/2021 5 9 ug/mL Final     /50   Pulse (!) 48   Temp 98 4 °F (36 9 °C)   Resp 17   Ht 5' 4" (1 626 m)   Wt 55 kg (121 lb 4 1 oz)   LMP  (LMP Unknown)   SpO2 97%   BMI 20 81 kg/m²   I/O last 3 completed shifts: In: 1740 [I V :1000; IV Piggyback:740]  Out: 480 [Urine:480]  Lab Results   Component Value Date/Time    BUN 25 07/06/2021 08:21 AM    WBC 20 14 (H) 07/06/2021 08:21 AM    HGB 11 8 07/06/2021 08:21 AM    HCT 39 7 07/06/2021 08:21 AM     (H) 07/06/2021 08:21 AM     (L) 07/06/2021 08:21 AM     Temp Readings from Last 3 Encounters:   07/06/21 98 4 °F (36 9 °C)   11/06/19 97 9 °F (36 6 °C)   11/03/19 97 7 °F (36 5 °C) (Tympanic)     Vancomycin Days of Therapy: 2    Assessment/Plan  The patient is currently on vancomycin utilizing scheduled dosing  Baseline risks associated with therapy include: pre-existing renal impairment, concomitant nephrotoxic medications, advanced age, and dehydration  The patient is receiving 750 mg iv q 24 hours with the most recent vancomycin level being not at steady-state and sub-therapeutic (5 9) based on a goal of 15-20 (appropriate for most indications) ; therefore, after clinical evaluation will be changed to 750 mg iv q 12 hours   Pharmacy will continue to follow closely for s/sx of nephrotoxicity, infusion reactions, and appropriateness of therapy    BMP and CBC will be ordered per protocol  Plan for trough as patient approaches steady state, prior to the 4th  dose at approximately 21:30 on 7/07/2021  Pharmacy will continue to follow the patients culture results and clinical progress daily      Stefan Vasquez, Pharmacist

## 2021-07-07 NOTE — ASSESSMENT & PLAN NOTE
79 y/o woman with pmhx of recurring right periorbital cellulitis/abscess +MRSA requiring OR I&D, CLL/lymphoma s/p radiation, Alzheimer's dementia, HTN, afib, CHF brought to ER by daughter c/o left eye erythema, swelling, bloody drainage and subjective fever x10 days  Patient is on day 10 of home PO doxycyline 100mg BID  · Currently afebrile but with leukocytosis   · Per daughter, subjective fever at home   · CT facial bones with contrast:   · Subcutaneous inflammation in the left nasal region and nasolacrimal fold, contiguous with left ethmoid air cell opacification via focal dehiscence of the medial wall of the left orbit and lamina papyracea, suggesting suggesting sinogenic source of cellulitis  No evidence of abscess  · Left nasal inflammation extends into the anteromedial aspect of the left orbit  Focal dehiscence of the floor of the left orbit without evidence of direct extension of inflammation  No retrobulbar inflammation or subperiosteal abscess  · Extensive paranasal sinus disease   · Culture of left eye drainage: pending   · Empirically treat with IV Vancomycin given hx of prior MRSA infection and abscess of right eye requiring OR I&D 11/2019   · ENT and ID consults placed- appreciate input   · Blood cultures x2: pending   · Procalcitonin: negative   · Lactic acid: negative   · Monitor fever curve/hemodynamics   · Spoke at length with patient's family, they indicated they no longer are desiring transfer to [facility] where her family physician/other physicians are located at; they indicated they want abx treatment here, and when stabilized want to bring her home, no further invasive measures such as radiation  Indicated they wish to follow patient's living will     · Attending and consultants made aware  · Continue IV antibiotics this time  · Patient able to open eye, less drainage and less swelling as reported by daughter-in-law

## 2021-07-07 NOTE — PROGRESS NOTES
Progress Note - Infectious Disease   Yodit Zacarias 80 y o  female MRN: [id number]  Unit/Bed#: [facility] Encounter: [id number]      Impression/Plan:  1  Left periorbital cellulitis-verses mass with destructive changes in the setting of CLL/lymphoma  In the the past the patient has had MRSA abscess and has had recurrent bouts of drainage and erythema around the eye that all seemed to respond to doxycycline suggesting that MRSA is once again playing a role  There was reported erythema although this seems to have resolved and I do not see any drainage of this time  -continue vancomycin for now at current dose  -pharmacy follow-up for vancomycin trough management  -close ENT follow-up  -eventual endoscopic surgery of the sinuses for exoneration and biopsy   -recheck CBC with diff and BMP  -serial exams     2  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   This is what is felt to be causing the recurrent infection  She has been followed by ENT and previously has been treated with radiation  -workup and management as above  -likely transfer to her primary center of care for additional management     3  Acute kidney injury-suspect pre renal issue  No other clear source appreciated  The creatinine is relatively stable although modest improvement is noted   -volume management  -dose adjust antibiotics as needed  -recheck BMP     4  Leukocytosis-suspect all related to the patient's CLL/lymphoma with lymphocytes predominating  The white cell count has drifted down a bit      5   Dementia-patient currently lives with her daughter and currently is nonverbal unable to the provide any history   -level of care issues would be appropriate    Discussed the above management plan with the primary service    Patient to be transferred to [facility]    Antibiotics:  Vancomycin 3    Subjective:  Patient has no fever, chills, sweats; no nausea, vomiting, diarrhea; no cough, shortness of breath; no report pain  No new symptoms  She has remained hemodynamically stable  She is more awake  Objective:  Vitals:  Temp:  [97 3 °F (36 3 °C)-98 4 °F (36 9 °C)] 97 4 °F (36 3 °C)  HR:  [46-58] 58  Resp:  [17-18] 17  BP: ()/(45-73) 99/50  SpO2:  [94 %-98 %] 98 %  Temp (24hrs), Av 6 °F (36 4 °C), Min:97 3 °F (36 3 °C), Max:98 4 °F (36 9 °C)  Current: Temperature: (!) 97 4 °F (36 3 °C)    Physical Exam:   General Appearance:  Alert, confused, interactive, nontoxic, no acute distress  Throat: Oropharynx moist without lesions  Lungs:   Clear to auscultation bilaterally; no wheezes, rhonchi or rales; respirations unlabored   Heart:  RRR; no murmur, rub or gallop   Abdomen:   Soft, non-tender, non-distended, positive bowel sounds  Extremities: No clubbing, cyanosis or edema   Skin: No new rashes or lesions  No draining wounds noted  Left periorbital region with faint erythema, mild swelling, and some purulent drainage medially       Labs, Imaging, & Other studies:   All pertinent labs and imaging studies were personally reviewed  Results from last 7 days   Lab Units 21  0609 21  0821 21  1805   WBC Thousand/uL 16 34* 20 14* 21 32*   HEMOGLOBIN g/dL 11 3* 11 8 12 1   PLATELETS Thousands/uL 116* 119* 125*     Results from last 7 days   Lab Units 21  0609 21  0821 21  1805   SODIUM mmol/L 145 150* 156*   POTASSIUM mmol/L 4 2 4 5 3 6   CHLORIDE mmol/L 108 113* 118*   CO2 mmol/L 29 30 32   BUN mg/dL 28* 25 33*   CREATININE mg/dL 1 13 1 20 1 23   EGFR ml/min/1 73sq m 41 39 37   CALCIUM mg/dL 8 3 8 6 8 8     Results from last 7 days   Lab Units 21  2351 21  1938   BLOOD CULTURE   --  No Growth at 24 hrs  No Growth at 24 hrs     GRAM STAIN RESULT  Rare Polys  No organisms seen  --      Results from last 7 days   Lab Units 21  0821   PROCALCITONIN ng/ml <0 05

## 2021-07-07 NOTE — CASE MANAGEMENT
Daughter and son in to visit pt  IMM reviewed with daughter and son  Daughter signed IMM and copy given  Copy also placed in scan bin for chart

## 2021-07-07 NOTE — PROGRESS NOTES
Ana Paula 45  [address] 12/1/1925, 80 y o  female MRN: [id number]  Unit/Bed#: [address] [place] [address] Encounter: [id number]  Primary Care Provider: John Moon MD   Date and time admitted to hospital: 7/5/2021  5:31 PM    * Periorbital cellulitis of left eye  Assessment & Plan  79 y/o woman with pmhx of recurring right periorbital cellulitis/abscess +MRSA requiring OR I&D, CLL/lymphoma s/p radiation, Alzheimer's dementia, HTN, afib, CHF brought to ER by daughter c/o left eye erythema, swelling, bloody drainage and subjective fever x10 days  Patient is on day 10 of home PO doxycyline 100mg BID  · Currently afebrile but with leukocytosis   · Per daughter, subjective fever at home   · CT facial bones with contrast:   · Subcutaneous inflammation in the left nasal region and nasolacrimal fold, contiguous with left ethmoid air cell opacification via focal dehiscence of the medial wall of the left orbit and lamina papyracea, suggesting suggesting sinogenic source of cellulitis  No evidence of abscess  · Left nasal inflammation extends into the anteromedial aspect of the left orbit  Focal dehiscence of the floor of the left orbit without evidence of direct extension of inflammation  No retrobulbar inflammation or subperiosteal abscess    · Extensive paranasal sinus disease   · Culture of left eye drainage: pending   · Empirically treat with IV Vancomycin given hx of prior MRSA infection and abscess of right eye requiring OR I&D 11/2019   · ENT and ID consults placed- appreciate input   · Blood cultures x2: pending   · Procalcitonin: negative   · Lactic acid: negative   · Monitor fever curve/hemodynamics   · Spoke at length with patient's family, they indicated they no longer are desiring transfer to [facility] where her family physician/other physicians are located at; they indicated they want abx treatment here, and when stabilized want to bring her home, no further invasive measures such as radiation  Indicated they wish to follow patient's living will  · Attending and consultants made aware  · Continue IV antibiotics this time  · Patient able to open eye, less drainage and less swelling as reported by daughter-in-law      Hypertension  Assessment & Plan  · BP currently stable   · Continue metoprolol succinate 50mg qAM     Chronic congestive heart failure ([facility] [address] )  Assessment & Plan  Wt Readings from Last 3 Encounters:   07/07/21 51 7 kg (113 lb 15 7 oz)   11/06/19 76 8 kg (169 lb 6 4 oz)   11/03/19 85 3 kg (188 lb)       · No clinical evidence of acute exacerbation   · Patient appears to be dry, no signs of fluid overload   · Hold PO lasix and K supplementation   · I/O   · Daily weights          A-fib (HCC)  Assessment & Plan  · Rate controlled with metoprolol succinate 50mg daily   · Not on anticoagulation       History of MRSA infection  Assessment & Plan  · Prior admission for right periorbital abscess treated with Doxycyline 11/2019   · Patient was discharged home with mupirocin intranasally given MRSA colonization and infection  · Per daughter, pt has PO doxycyline at home which patient would take as prophylaxis if noticed increased redness/swelling around the eyes   · Patient is on day 10 of PO doxycyline at home with no relief of eye symptoms- stop doxycyline  · IV Vancomycin pending ID consult- appreciate input   · Culture of right eye drainage: pending  · As noted above patient's family indicated that they desire IV antibiotic treatment, when patient is stable wish to bring her home, fell the wishes of her Living Will, no further invasive procedures such as surgery        Dementia [facility])  Assessment & Plan  · Patient currently lives at home with daughter [facility]) and is taken care of by her and caregiver   · Continue fluvoxamine 100mg and mirtazapine 30mg daily   · PT/OT consult:  Patient is dependent  · Speech eval:  Soft food thin liquids      MINI (acute kidney injury) [facility])  Assessment & Plan  · Present on admission   · Baseline creatinine: 0 8-1 0   · Creatine today 1 13  · Likely pre-renal in setting of dehydration and poor PO intake   · Gentle IVF hydration with D5W  · Hold PO lasix and potassium supplementation   · Avoid hypotension/nephrotoxins   · Urinary retention protocol, bladder scan     Hypernatremia  Assessment & Plan  · Suspected in the setting of dehydration and poor PO intake   · Gentle IVF hydration with D5W    · Improving, currently 145  · Recheck in AM     CLL (chronic lymphocytic leukemia) ([facility] [address] )  Assessment & Plan  · Patient follows with otolaryngology as outpatient   · Hx of radiation for right eye lymph node 12/2019   · Upon review of prior records, it is suggested that her CLL/lymphoma diagnosis is the underlying process for her recurring periorbital cellulitis infections which requires close monitoring by ENT or Dr Canelo Conteh with otolaryngology to optimize healing of the nasolacrimal duct system/sinonasal passages   · ENT consult placed; initially patient was to be transferred to [facility], however after discussion with family they prefer for IV antibiotic treatment here, once stabilized bring patient home, no further invasive procedures as per patient's wishes/living will  VTE Pharmacologic Prophylaxis:   Pharmacologic: Heparin  Mechanical VTE Prophylaxis in Place: Yes    Patient Centered Rounds: I have performed bedside rounds with nursing staff today  Discussions with Specialists or Other Care Team Provider:  Attending, id and ENT    Education and Discussions with Family / Patient:  I spoke with the patient at the bedside, I also spoke with the patient's family at the bedside, I have answered all questions to the best of my abilities  Time Spent for Care: 30 minutes  More than 50% of total time spent on counseling and coordination of care as described above      Current Length of Stay: 2 day(s)    Current Patient Status: Inpatient   Certification Statement: The patient will continue to require additional inpatient hospital stay due to Continued IV antibiotic therapy, monitoring left periorbital region/drainage  Discharge Plan:  Not stable for discharge today    Code Status: Level 3 - DNAR and DNI      Subjective:   Patient seen lying in bed resting comfortably  Opens eyes when spoken to  She smiles and answers simple questions, was able to tell me her name and her date of birth, however was uncertain of where she was at  Objective:     Vitals:   Temp (24hrs), Av 6 °F (36 4 °C), Min:97 3 °F (36 3 °C), Max:98 4 °F (36 9 °C)    Temp:  [97 3 °F (36 3 °C)-98 4 °F (36 9 °C)] 97 4 °F (36 3 °C)  HR:  [46-58] 54  Resp:  [17-18] 17  BP: ()/(45-73) 99/53  SpO2:  [94 %-98 %] 98 %  Body mass index is 19 56 kg/m²  Input and Output Summary (last 24 hours): Intake/Output Summary (Last 24 hours) at 2021 1502  Last data filed at 2021 0601  Gross per 24 hour   Intake 1800 ml   Output 1180 ml   Net 620 ml       Physical Exam:     Physical Exam  Vitals and nursing note reviewed  Constitutional:       Comments: Chronically ill-appearing elderly female resting quietly in bed  Eyes:      General:         Left eye: Discharge present  Comments: Patient able to open both eyes, EOM intact   Cardiovascular:      Rate and Rhythm: Rhythm irregular  Pulses: Normal pulses  Heart sounds: Murmur heard  Pulmonary:      Effort: Pulmonary effort is normal       Breath sounds: Normal breath sounds  Abdominal:      General: Bowel sounds are normal       Palpations: Abdomen is soft  Genitourinary:     Comments: Voiding spontaneously  Musculoskeletal:      Cervical back: Normal range of motion  Comments: Generalized deconditioning   Skin:     General: Skin is dry  Capillary Refill: Capillary refill takes less than 2 seconds  Coloration: Skin is pale     Neurological:      Comments: Patient was unable to tell me her location, was able to tell me her name, uncertain of year  Was able to move all extremities to command  Psychiatric:      Comments: Pleasant cooperative with exam         Additional Data:     Labs:    Results from last 7 days   Lab Units 07/07/21  0609 07/05/21  1805   WBC Thousand/uL 16 34* 21 32*   HEMOGLOBIN g/dL 11 3* 12 1   HEMATOCRIT % 35 8 38 5   PLATELETS Thousands/uL 116* 125*   LYMPHO PCT %  --  74*   MONO PCT %  --  4   EOS PCT %  --  2     Results from last 7 days   Lab Units 07/07/21  0609   POTASSIUM mmol/L 4 2   CHLORIDE mmol/L 108   CO2 mmol/L 29   BUN mg/dL 28*   CREATININE mg/dL 1 13   CALCIUM mg/dL 8 3           * I Have Reviewed All Lab Data Listed Above  * Additional Pertinent Lab Tests Reviewed: All Labs Within Last 24 Hours Reviewed    Imaging:    Imaging Reports Reviewed Today Include:  None  Imaging Personally Reviewed by Myself Includes:  None    Recent Cultures (last 7 days):     Results from last 7 days   Lab Units 07/05/21  2351 07/05/21  1938   BLOOD CULTURE   --  No Growth at 24 hrs  No Growth at 24 hrs  GRAM STAIN RESULT  Rare Polys  No organisms seen  --    WOUND CULTURE  Culture results to follow    --        Last 24 Hours Medication List:   Current Facility-Administered Medications   Medication Dose Route Frequency Provider Last Rate    acetaminophen  650 mg Oral Q6H PRN Sulaiman Banks, PA-C      dextrose  50 mL/hr Intravenous Continuous Sulaiman Reef, PA-C 50 mL/hr (07/06/21 2024)    fluvoxaMINE  100 mg Oral Daily Sulaiman Banks, PA-C      heparin (porcine)  5,000 Units Subcutaneous [facility] Sulaiman Banks, PA-C      metoprolol succinate  50 mg Oral Daily Sulaiman Reef, PA-C      mirtazapine  30 mg Oral Daily Sulaiman Banks, PA-C      vancomycin  12 5 mg/kg Intravenous Q12H Emelina Gomes  mg (07/07/21 1030)        Today, Patient Was Seen By: MOE Brady    ** Please Note: Dictation voice to text software may have been used in the creation of this document   **

## 2021-07-07 NOTE — ASSESSMENT & PLAN NOTE
· Patient currently lives at home with daughter [facility]) and is taken care of by her and caregiver   · Continue fluvoxamine 100mg and mirtazapine 30mg daily   · PT/OT consult:  Patient is dependent  · Speech eval:  Soft food thin liquids

## 2021-07-08 LAB
ANION GAP SERPL CALCULATED.3IONS-SCNC: 8 MMOL/L (ref 4–13)
BUN SERPL-MCNC: 28 MG/DL (ref 5–25)
CALCIUM SERPL-MCNC: 8.4 MG/DL (ref 8.3–10.1)
CHLORIDE SERPL-SCNC: 103 MMOL/L (ref 100–108)
CO2 SERPL-SCNC: 26 MMOL/L (ref 21–32)
CREAT SERPL-MCNC: 1.03 MG/DL (ref 0.6–1.3)
ERYTHROCYTE [DISTWIDTH] IN BLOOD BY AUTOMATED COUNT: 14.2 % (ref 11.6–15.1)
GFR SERPL CREATININE-BSD FRML MDRD: 46 ML/MIN/1.73SQ M
GLUCOSE SERPL-MCNC: 151 MG/DL (ref 65–140)
HCT VFR BLD AUTO: 34.8 % (ref 34.8–46.1)
HGB BLD-MCNC: 10.9 G/DL (ref 11.5–15.4)
MCH RBC QN AUTO: 35.3 PG (ref 26.8–34.3)
MCHC RBC AUTO-ENTMCNC: 31.3 G/DL (ref 31.4–37.4)
MCV RBC AUTO: 113 FL (ref 82–98)
PLATELET # BLD AUTO: 102 THOUSANDS/UL (ref 149–390)
PMV BLD AUTO: 12.5 FL (ref 8.9–12.7)
POTASSIUM SERPL-SCNC: 3.8 MMOL/L (ref 3.5–5.3)
RBC # BLD AUTO: 3.09 MILLION/UL (ref 3.81–5.12)
SODIUM SERPL-SCNC: 137 MMOL/L (ref 136–145)
WBC # BLD AUTO: 28.5 THOUSAND/UL (ref 4.31–10.16)

## 2021-07-08 PROCEDURE — 99233 SBSQ HOSP IP/OBS HIGH 50: CPT | Performed by: INTERNAL MEDICINE

## 2021-07-08 PROCEDURE — 99232 SBSQ HOSP IP/OBS MODERATE 35: CPT | Performed by: NURSE PRACTITIONER

## 2021-07-08 PROCEDURE — 85027 COMPLETE CBC AUTOMATED: CPT | Performed by: NURSE PRACTITIONER

## 2021-07-08 PROCEDURE — 80048 BASIC METABOLIC PNL TOTAL CA: CPT | Performed by: NURSE PRACTITIONER

## 2021-07-08 RX ADMIN — VANCOMYCIN HYDROCHLORIDE 500 MG: 500 INJECTION, POWDER, LYOPHILIZED, FOR SOLUTION INTRAVENOUS at 21:38

## 2021-07-08 RX ADMIN — HEPARIN SODIUM 5000 UNITS: 5000 INJECTION INTRAVENOUS; SUBCUTANEOUS at 21:38

## 2021-07-08 RX ADMIN — FLUVOXAMINE MALEATE 100 MG: 100 TABLET ORAL at 08:30

## 2021-07-08 RX ADMIN — VANCOMYCIN HYDROCHLORIDE 500 MG: 500 INJECTION, POWDER, LYOPHILIZED, FOR SOLUTION INTRAVENOUS at 11:21

## 2021-07-08 RX ADMIN — HEPARIN SODIUM 5000 UNITS: 5000 INJECTION INTRAVENOUS; SUBCUTANEOUS at 05:59

## 2021-07-08 RX ADMIN — MIRTAZAPINE 30 MG: 15 TABLET, FILM COATED ORAL at 08:30

## 2021-07-08 RX ADMIN — HEPARIN SODIUM 5000 UNITS: 5000 INJECTION INTRAVENOUS; SUBCUTANEOUS at 14:19

## 2021-07-08 NOTE — ASSESSMENT & PLAN NOTE
· Patient currently lives at home with daughter [facility]) and is taken care of by her and caregiver   · Continue fluvoxamine 100mg and mirtazapine 30mg daily   · PT/OT consult:  Patient is dependent  · Speech eval:  Soft food thin liquids

## 2021-07-08 NOTE — ASSESSMENT & PLAN NOTE
79 y/o woman with pmhx of recurring right periorbital cellulitis/abscess +MRSA requiring OR I&D, CLL/lymphoma s/p radiation, Alzheimer's dementia, HTN, afib, CHF brought to ER by daughter c/o left eye erythema, swelling, bloody drainage and subjective fever x10 days  Patient is on day 10 of home PO doxycyline 100mg BID  · Left periorbital cellulitis versus mass with destructive changes in the setting CLL/lymphoma  · Currently afebrile but with leukocytosis ,improving  · Per daughter, subjective fever at home   · CT facial bones with contrast:   · Subcutaneous inflammation in the left nasal region and nasolacrimal fold, contiguous with left ethmoid air cell opacification via focal dehiscence of the medial wall of the left orbit and lamina papyracea,suggesting sinogenic source of cellulitis  No evidence of abscess  · Left nasal inflammation extends into the anteromedial aspect of the left orbit  Focal dehiscence of the floor of the left orbit without evidence of direct extension of inflammation  No retrobulbar inflammation or subperiosteal abscess  · Extensive paranasal sinus disease   · Culture of left eye drainage: MSSA  · Day 5 of IV Vancomycin given hx of prior MRSA infection and abscess of right eye requiring OR I&D 11/2019   · Defer antibiotics to ID  · ENT and ID consults placed- appreciate input   · Received Decadron 10 milligram IV q 8 hours x2 doses pre ENT  · Patient may ultimately need endoscopic sinus surgery again to exenterate the sinuses and biopsy the area  Family declining further invasive procedure  · Blood cultures x2:  No growth to date  · Procalcitonin negative x2  · Lactic acid: negative   · Discussed with ENT, recommending MRI; discussed with family; declined     · No left periorbital edema or redness on exam today  · Continue to monitor

## 2021-07-08 NOTE — ASSESSMENT & PLAN NOTE
Wt Readings from Last 3 Encounters:   07/08/21 51 9 kg (114 lb 6 7 oz)   11/06/19 76 8 kg (169 lb 6 4 oz)   11/03/19 85 3 kg (188 lb)       · No clinical evidence of acute exacerbation   · Patient appears to be dry, no signs of fluid overload   · Hold PO lasix and K supplementation   · I/O   · Daily weights

## 2021-07-08 NOTE — PROGRESS NOTES
Vancomycin IV Pharmacy-to-Dose Consultation    Tabitha Corbin is a 80 y o  female who is currently receiving Vancomycin IV with management by the Pharmacy Consult service  Assessment/Plan:  The patient was reviewed  Renal function is stable and no signs or symptoms of nephrotoxicity and/or infusion reactions were documented in the chart  Based on todays assessment, continue current vancomycin (day # 5) dosing of 500 mg IV q12h, with a plan for trough to be drawn at 0930 on 7/10/21  We will continue to follow the patients culture results and clinical progress daily      Pete Siemens, Pharmacist

## 2021-07-08 NOTE — ASSESSMENT & PLAN NOTE
· Patient currently lives at home with daughter [facility]) and is taken care of by her and caregiver   · Continue fluvoxamine 100mg and mirtazapine 30mg daily   · PT/OT consult:  Patient is dependent  · Speech eval:  Soft food thin liquids

## 2021-07-08 NOTE — ASSESSMENT & PLAN NOTE
· Suspected in the setting of dehydration and poor PO intake   · Improving, currently 137  · Oral intake improving as per discussion with nursing, discontinue D5 infusion  · Recheck in AM

## 2021-07-08 NOTE — ASSESSMENT & PLAN NOTE
· Patient follows with otolaryngology as outpatient   · Hx of radiation for right eye lymph node 12/2019   · Upon review of prior records, it is suggested that her CLL/lymphoma diagnosis is the underlying process for her recurring periorbital cellulitis infections which requires close monitoring by ENT or Dr Lauro Richardson with otolaryngology to optimize healing of the nasolacrimal duct system/sinonasal passages   · ENT consult placed; initially patient was to be transferred to [facility], however after discussion with family they prefer for IV antibiotic treatment here, once stabilized bring patient home, no further invasive procedures as per patient's wishes/living will  · Discussed with ENT here, recommending MRI for completeness

## 2021-07-08 NOTE — ASSESSMENT & PLAN NOTE
· Prior admission for right periorbital abscess treated with Doxycyline 11/2019   · Patient was discharged home with mupirocin intranasally given MRSA colonization and infection    · Per daughter, pt has PO doxycyline at home which patient would take as prophylaxis if noticed increased redness/swelling around the eyes   · Patient is on day 10 of PO doxycyline at home with no relief of eye symptoms on presentation

## 2021-07-08 NOTE — ASSESSMENT & PLAN NOTE
· Baseline creatinine: 0 8-1 0   · Creatine on admission 1 23  Slightly elevated from baseline  Does not meet MINI criteria  · Creatine today 1 03  · Likely pre-renal in setting of dehydration and poor PO intake   · Patient's oral intake is poor today per RN     · Holding PO lasix and potassium supplementation   · Avoid hypotension/nephrotoxins   · Urinary retention protocol, bladder scan

## 2021-07-08 NOTE — PLAN OF CARE
Problem: Potential for Falls  Goal: Patient will remain free of falls  Description: INTERVENTIONS:  - Educate patient/family on patient safety including physical limitations  - Instruct patient to call for assistance with activity   - Consult OT/PT to assist with strengthening/mobility   - Keep Call bell within reach  - Keep bed low and locked with side rails adjusted as appropriate  - Keep care items and personal belongings within reach  - Initiate and maintain comfort rounds  - Make Fall Risk Sign visible to staff  - Offer Toileting every 2 Hours, in advance of need  - Initiate/Maintain bed/chairalarm  - Obtain necessary fall risk management equipment: walker  - Apply yellow socks and bracelet for high fall risk patients  - Consider moving patient to room near nurses station  Outcome: Progressing     Problem: MOBILITY - ADULT  Goal: Maintain or return to baseline ADL function  Description: INTERVENTIONS:  -  Assess patient's ability to carry out ADLs; assess patient's baseline for ADL function and identify physical deficits which impact ability to perform ADLs (bathing, care of mouth/teeth, toileting, grooming, dressing, etc )  - Assess/evaluate cause of self-care deficits   - Assess range of motion  - Assess patient's mobility; develop plan if impaired  - Assess patient's need for assistive devices and provide as appropriate  - Encourage maximum independence but intervene and supervise when necessary  - Involve family in performance of ADLs  - Assess for home care needs following discharge   - Consider OT consult to assist with ADL evaluation and planning for discharge  - Provide patient education as appropriate  Outcome: Progressing  Goal: Maintains/Returns to pre admission functional level  Description: INTERVENTIONS:  - Perform BMAT or MOVE assessment daily    - Set and communicate daily mobility goal to care team and patient/family/caregiver     - Collaborate with rehabilitation services on mobility goals if consulted  - Perform Range of Motion 3 times a day  - Reposition patient every 2 hours    - Dangle patient 3 times a day  - Stand patient 3 times a day  - Ambulate patient 2 times a day  - Out of bed to chair 2 times a day   - Out of bed for meals 3 times a day  - Out of bed for toileting  - Record patient progress and toleration of activity level   Outcome: Progressing

## 2021-07-08 NOTE — PROGRESS NOTES
Progress Note - Infectious Disease   Jan Sears 80 y o  female MRN: [id number]  Unit/Bed#:  [facility] Encounter: [id number]      Impression/Plan:  1  Left periorbital cellulitis-verses mass with destructive changes in the setting of CLL/lymphoma  In the the past, she has had MRSA abscess and has had recurrent bouts of drainage and erythema around the eye  This seemed to respond to doxycycline, suggesting that MRSA is playing a role     -continue vancomycin iv  -pharmacy follow-up for vancomycin trough management  -close ENT follow-up  -eventual endoscopic surgery of the sinuses for exoneration and biopsy   -recheck CBC with diff and creatinine in am  -serial exams     2  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   This is what is felt to be causing the recurrent infection  She has been followed by ENT and previously has been treated with radiation  -workup and management as above     3  Acute kidney injury-suspect pre renal issue  No other clear source appreciated  The creatinine is relatively stable  -dose adjust antibiotics as needed  -recheck BMP in a m      4  Leukocytosis-suspect all related to the patient's CLL/lymphoma with lymphocytes predominating  WBC is rising, although patient appears clinically stable      5  Dementia-patient currently lives with her daughter and is unable to reliable history   -level of care issues would be appropriate    Discussed the above management plan with Dr Shirley Esparza from the primary service    Antibiotics:  Vancomycin 4    Subjective:  Patient has no fever  No report of vomiting, diarrhea or new rash       Objective:  Vitals:  Temp:  [97 3 °F (36 3 °C)-97 5 °F (36 4 °C)] 97 5 °F (36 4 °C)  HR:  [46-76] 76  Resp:  [16-18] 18  BP: (101-140)/(44-53) 106/44  SpO2:  [94 %-98 %] 94 %  Temp (24hrs), Av 4 °F (36 3 °C), Min:97 3 °F (36 3 °C), Max:97 5 °F (36 4 °C)  Current: Temperature: 97 5 °F (36 4 °C)    Physical Exam:   General Appearance:  Awake, alert, confused, interactive, no acute distress  Throat: Oropharynx moist without lesions  Lungs:   Clear to auscultation bilaterally; no wheezes, rhonchi or rales; respirations unlabored   Heart:  S1, S2, RRR; no murmur, rub or gallop   Abdomen:   Soft, non-tender, non-distended   Extremities: No clubbing, cyanosis or distal leg edema b/l   Skin: No new rashes or lesions  No draining wounds noted  Left periorbital region with faint erythema, mild swelling, and no drainage       Labs, Imaging, & Other studies:   All pertinent labs and imaging studies were personally reviewed  Results from last 7 days   Lab Units 07/08/21  0550 07/07/21  0609 07/06/21  0821   WBC Thousand/uL 28 50* 16 34* 20 14*   HEMOGLOBIN g/dL 10 9* 11 3* 11 8   PLATELETS Thousands/uL 102* 116* 119*     Results from last 7 days   Lab Units 07/08/21  0550 07/07/21  0609 07/06/21  0821   SODIUM mmol/L 137 145 150*   POTASSIUM mmol/L 3 8 4 2 4 5   CHLORIDE mmol/L 103 108 113*   CO2 mmol/L 26 29 30   BUN mg/dL 28* 28* 25   CREATININE mg/dL 1 03 1 13 1 20   EGFR ml/min/1 73sq m 46 41 39   CALCIUM mg/dL 8 4 8 3 8 6     Results from last 7 days   Lab Units 07/05/21  2351 07/05/21  1938   BLOOD CULTURE   --  No Growth at 48 hrs  No Growth at 48 hrs     GRAM STAIN RESULT  Rare Polys  No organisms seen  --    WOUND CULTURE  Few Colonies of Staphylococcus aureus*  1+ Growth of   --      Results from last 7 days   Lab Units 07/07/21  0609 07/06/21  0821   PROCALCITONIN ng/ml <0 05 <0 05

## 2021-07-08 NOTE — PROGRESS NOTES
Vancomycin Assessment    Tina Rod is a 80 y o  female who is currently receiving vancomycin 750 mg IV q12h for skin-soft tissue infection     Relevant clinical data and objective history reviewed:  Creatinine   Date Value Ref Range Status   07/07/2021 1 13 0 60 - 1 30 mg/dL Final     Comment:     Standardized to IDMS reference method   07/06/2021 1 20 0 60 - 1 30 mg/dL Final     Comment:     Standardized to IDMS reference method   07/05/2021 1 23 0 60 - 1 30 mg/dL Final     Comment:     Standardized to IDMS reference method     Vancomycin Rm   Date Value Ref Range Status   07/06/2021 5 9 ug/mL Final     BP (!) 105/49   Pulse (!) 49   Temp 97 5 °F (36 4 °C)   Resp 17   Ht 5' 4" (1 626 m)   Wt 51 7 kg (113 lb 15 7 oz)   LMP  (LMP Unknown)   SpO2 95%   BMI 19 56 kg/m²   I/O last 3 completed shifts: In: 1800 [P O :50; I V :1600; IV Piggyback:150]  Out: 1180 [Urine:1180]  Lab Results   Component Value Date/Time    BUN 28 (H) 07/07/2021 06:09 AM    WBC 16 34 (H) 07/07/2021 06:09 AM    HGB 11 3 (L) 07/07/2021 06:09 AM    HCT 35 8 07/07/2021 06:09 AM     (H) 07/07/2021 06:09 AM     (L) 07/07/2021 06:09 AM     Temp Readings from Last 3 Encounters:   07/07/21 97 5 °F (36 4 °C)   11/06/19 97 9 °F (36 6 °C)   11/03/19 97 7 °F (36 5 °C) (Tympanic)       Assessment/Plan  The patient is currently on vancomycin utilizing scheduled dosing  Baseline risks associated with therapy include: pre-existing renal impairment, concomitant nephrotoxic medications, advanced age, and dehydration  The patient is receiving 750 mg IV q12h with the most recent vancomycin level being at steady-state and therapeutic based on a goal of 15-20 (appropriate for most indications) ; therefore, after clinical evaluation will be changed to 500 mg IV q12h   Pharmacy will continue to follow closely for s/sx of nephrotoxicity, infusion reactions, and appropriateness of therapy  BMP and CBC will be ordered per protocol    Plan for trough as patient approaches steady state at approximately 0930 on 7/10/21  Pharmacy will continue to follow the patients culture results and clinical progress daily      Martínez Pacheco, Pharmacist

## 2021-07-08 NOTE — ASSESSMENT & PLAN NOTE
· Prior admission for right periorbital abscess treated with Doxycyline 11/2019   · Patient was discharged home with mupirocin intranasally given MRSA colonization and infection  · Per daughter, pt has PO doxycyline at home which patient would take as prophylaxis if noticed increased redness/swelling around the eyes   · Patient is on day 10 of PO doxycyline at home with no relief of eye symptoms- stop doxycyline  · IV Vancomycin pending ID consult- appreciate input   · Culture of right eye drainage:  Preliminary shows few colonies of Staphylococcus aureus  · As noted above patient's family indicated that they desire IV antibiotic treatment, when patient is stable wish to bring her home, fell the wishes of her Living Will, no further invasive procedures such as surgery

## 2021-07-08 NOTE — PROGRESS NOTES
Ana Paula 45  Progress Note Nisha Espinal 12/1/1925, 80 y o  female MRN: [id number]  Unit/Bed#: [address] [place] [address] Encounter: [id number]  Primary Care Provider: Steve Castelan MD   Date and time admitted to hospital: 7/5/2021  5:31 PM    * Periorbital cellulitis of left eye  Assessment & Plan  79 y/o woman with pmhx of recurring right periorbital cellulitis/abscess +MRSA requiring OR I&D, CLL/lymphoma s/p radiation, Alzheimer's dementia, HTN, afib, CHF brought to ER by daughter c/o left eye erythema, swelling, bloody drainage and subjective fever x10 days  Patient is on day 10 of home PO doxycyline 100mg BID  · Currently afebrile but with leukocytosis   · Per daughter, subjective fever at home   · CT facial bones with contrast:   · Subcutaneous inflammation in the left nasal region and nasolacrimal fold, contiguous with left ethmoid air cell opacification via focal dehiscence of the medial wall of the left orbit and lamina papyracea, suggesting suggesting sinogenic source of cellulitis  No evidence of abscess  · Left nasal inflammation extends into the anteromedial aspect of the left orbit  Focal dehiscence of the floor of the left orbit without evidence of direct extension of inflammation  No retrobulbar inflammation or subperiosteal abscess    · Extensive paranasal sinus disease   · Culture of left eye drainage: pending   · Empirically treat with IV Vancomycin given hx of prior MRSA infection and abscess of right eye requiring OR I&D 11/2019   · ENT and ID consults placed- appreciate input   · Blood cultures x2: pending   · Procalcitonin: negative   · Lactic acid: negative   · Monitor fever curve/hemodynamics   · Spoke at length with patient's family, they indicated they no longer are desiring transfer to [facility] where her family physician/other physicians are located at; they indicated they want abx treatment here, and when stabilized want to bring her home, no further invasive measures such as radiation  Indicated they wish to follow patient's living will  · Attending and consultants made aware  · Discussed with ENT, recommending MRI; discussed with family; declined  · Continue IV antibiotics this time  · Patient able to open eye, periorbital region appears less swollen on exam today  · Continue to monitor      Hypertension  Assessment & Plan  · BP currently stable   · Continue metoprolol succinate 50mg qAM     Chronic congestive heart failure (HCC)  Assessment & Plan  Wt Readings from Last 3 Encounters:   07/08/21 51 9 kg (114 lb 6 7 oz)   11/06/19 76 8 kg (169 lb 6 4 oz)   11/03/19 85 3 kg (188 lb)       · No clinical evidence of acute exacerbation   · Patient appears to be dry, no signs of fluid overload   · Hold PO lasix and K supplementation   · I/O   · Daily weights          A-fib (HCC)  Assessment & Plan  · Rate controlled with metoprolol succinate 50mg daily   · Not on anticoagulation       History of MRSA infection  Assessment & Plan  · Prior admission for right periorbital abscess treated with Doxycyline 11/2019   · Patient was discharged home with mupirocin intranasally given MRSA colonization and infection  · Per daughter, pt has PO doxycyline at home which patient would take as prophylaxis if noticed increased redness/swelling around the eyes   · Patient is on day 10 of PO doxycyline at home with no relief of eye symptoms- stop doxycyline  · IV Vancomycin pending ID consult- appreciate input   · Culture of right eye drainage:  Preliminary shows few colonies of Staphylococcus aureus  · As noted above patient's family indicated that they desire IV antibiotic treatment, when patient is stable wish to bring her home, fell the wishes of her Living Will, no further invasive procedures such as surgery        Dementia [facility])  Assessment & Plan  · Patient currently lives at home with daughter [facility]) and is taken care of by her and caregiver   · Continue fluvoxamine 100mg and mirtazapine 30mg daily   · PT/OT consult:  Patient is dependent  · Speech eval:  Soft food thin liquids      MINI (acute kidney injury) ([facility] [address] )  Assessment & Plan  · Present on admission   · Baseline creatinine: 0 8-1 0   · Creatine today 1 03  · Likely pre-renal in setting of dehydration and poor PO intake   · Patient's oral intake has improved as per discussion with nursing  · Hold PO lasix and potassium supplementation   · Avoid hypotension/nephrotoxins   · Urinary retention protocol, bladder scan     Hypernatremia  Assessment & Plan  · Suspected in the setting of dehydration and poor PO intake   · Improving, currently 137  · Oral intake improving as per discussion with nursing, discontinue D5 infusion  · Recheck in AM     CLL (chronic lymphocytic leukemia) ([facility] )  Assessment & Plan  · Patient follows with otolaryngology as outpatient   · Hx of radiation for right eye lymph node 12/2019   · Upon review of prior records, it is suggested that her CLL/lymphoma diagnosis is the underlying process for her recurring periorbital cellulitis infections which requires close monitoring by ENT or Dr Moiz Shah with otolaryngology to optimize healing of the nasolacrimal duct system/sinonasal passages   · ENT consult placed; initially patient was to be transferred to [facility], however after discussion with family they prefer for IV antibiotic treatment here, once stabilized bring patient home, no further invasive procedures as per patient's wishes/living will  · Discussed with ENT here, recommending MRI for completeness  VTE Pharmacologic Prophylaxis:   Pharmacologic: Heparin  Mechanical VTE Prophylaxis in Place: Yes    Patient Centered Rounds: I have performed bedside rounds with nursing staff today  Discussions with Specialists or Other Care Team Provider:  Attending, ENT and case management    Education and Discussions with Family / Patient:  I spoke with the patient at bedside, she has dementia  Attempted to provide family with update via phone  Time Spent for Care: 30 minutes  More than 50% of total time spent on counseling and coordination of care as described above  Current Length of Stay: 3 day(s)    Current Patient Status: Inpatient   Certification Statement: The patient will continue to require additional inpatient hospital stay due to Continued IV antibiotic, serial facial exams, follow-up on MRI,    Discharge Plan:  Not stable for discharge today    Code Status: Level 3 - DNAR and DNI      Subjective:   Patient seen sitting up in bed resting comfortably  She is more awake alert than previous exam   Both eyes are open, she is tracking well  When speaking, patient occasionally has disjointed conversation, but then occasionally has clear concise sentences  Objective:     Vitals:   Temp (24hrs), Av 4 °F (36 3 °C), Min:97 3 °F (36 3 °C), Max:97 5 °F (36 4 °C)    Temp:  [97 3 °F (36 3 °C)-97 5 °F (36 4 °C)] 97 5 °F (36 4 °C)  HR:  [46-76] 76  Resp:  [16-18] 18  BP: (101-140)/(44-53) 106/44  SpO2:  [94 %-98 %] 94 %  Body mass index is 19 64 kg/m²  Input and Output Summary (last 24 hours): Intake/Output Summary (Last 24 hours) at 2021 1234  Last data filed at 2021 0600  Gross per 24 hour   Intake 600 ml   Output 450 ml   Net 150 ml       Physical Exam:     Physical Exam  Vitals and nursing note reviewed  Constitutional:       Comments: Thin, frail elderly female sitting up in bed, very interactive  HENT:      Head: Normocephalic  Nose: Nose normal       Mouth/Throat:      Mouth: Mucous membranes are moist    Eyes:      Comments: Small amount dried drainage noted below left eye, no drainage currently  Small area swelling noted under left periorbital region, no pain when palpated, soft  Cardiovascular:      Rate and Rhythm: Normal rate and regular rhythm  Pulses: Normal pulses  Heart sounds: Normal heart sounds     Pulmonary:      Effort: Pulmonary effort is normal       Breath sounds: Normal breath sounds  Abdominal:      General: Bowel sounds are normal  There is no distension  Palpations: Abdomen is soft  Genitourinary:     Comments: Pure wick in place, small amount of cloudy urine noted  Musculoskeletal:         General: No swelling  Cervical back: Normal range of motion  Comments: Moves all extremities, 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   Skin:     General: Skin is warm and dry  Capillary Refill: Capillary refill takes less than 2 seconds  Coloration: Skin is pale  Neurological:      General: No focal deficit present  Comments: Patient has a history of dementia, unable to state location, name, date  Occasionally speaks word salad, occasionally gets full clear sentences out  Psychiatric:      Comments: Pleasant and cooperative with exam          Additional Data:     Labs:    Results from last 7 days   Lab Units 07/08/21  0550 07/05/21  1805   WBC Thousand/uL 28 50* 21 32*   HEMOGLOBIN g/dL 10 9* 12 1   HEMATOCRIT % 34 8 38 5   PLATELETS Thousands/uL 102* 125*   LYMPHO PCT %  --  74*   MONO PCT %  --  4   EOS PCT %  --  2     Results from last 7 days   Lab Units 07/08/21  0550   POTASSIUM mmol/L 3 8   CHLORIDE mmol/L 103   CO2 mmol/L 26   BUN mg/dL 28*   CREATININE mg/dL 1 03   CALCIUM mg/dL 8 4           * I Have Reviewed All Lab Data Listed Above  * Additional Pertinent Lab Tests Reviewed: All Labs Within Last 24 Hours Reviewed    Imaging:    Imaging Reports Reviewed Today Include:  None  Imaging Personally Reviewed by Myself Includes:  None    Recent Cultures (last 7 days):     Results from last 7 days   Lab Units 07/05/21  2351 07/05/21  1938   BLOOD CULTURE   --  No Growth at 48 hrs  No Growth at 48 hrs     GRAM STAIN RESULT  Rare Polys  No organisms seen  --    WOUND CULTURE  Few Colonies of Staphylococcus aureus*  1+ Growth of   --        Last 24 Hours Medication List:   Current Facility-Administered Medications   Medication Dose Route Frequency Provider Last Rate    acetaminophen  650 mg Oral Q6H PRN Aparna Monet PA-C      fluvoxaMINE  100 mg Oral Daily Aparna Monet PA-C      heparin (porcine)  5,000 Units Subcutaneous [facility] Aparna Monet PA-C      metoprolol succinate  50 mg Oral Daily Aparna Monet PA-C      mirtazapine  30 mg Oral Daily Aparna Monet PA-C      vancomycin  500 mg Intravenous Q12H Vladimir Mcgill MD          Today, Patient Was Seen By: MOE Diop    ** Please Note: Dictation voice to text software may have been used in the creation of this document   **

## 2021-07-08 NOTE — ASSESSMENT & PLAN NOTE
· Rate controlled with metoprolol succinate 50mg daily   · Patient bradycardic on admission  Has not received metoprolol due to soft BP since admission  Heart rate improving  60-70's currently  · Change metoprolol to metoprolol tartrate 12 5 milligram p o  B i d  with holding parameter  · Not on anticoagulation   · Optimize electrolytes  K 3 7 today,Replete  Check Mg

## 2021-07-08 NOTE — ASSESSMENT & PLAN NOTE
· Suspected in the setting of dehydration and poor PO intake   · Resolved with D5 water    · Monitor BMP

## 2021-07-08 NOTE — ASSESSMENT & PLAN NOTE
· Patient follows with otolaryngology as outpatient   · Hx of radiation for right eye lymph node 12/2019   · Upon review of prior records, it is suggested that her CLL/lymphoma diagnosis is the underlying process for her recurring periorbital cellulitis infections which requires close monitoring by ENT or Dr Felicia Clark with otolaryngology to optimize healing of the nasolacrimal duct system/sinonasal passages   · ENT consult placed; initially patient was to be transferred to [facility], however after discussion with family they prefer for IV antibiotic treatment here, once stabilized bring patient home, no further invasive procedures as per patient's wishes/living will  · Discussed with ENT here, recommending MRI for completeness    Family declined

## 2021-07-08 NOTE — ASSESSMENT & PLAN NOTE
81 y/o woman with pmhx of recurring right periorbital cellulitis/abscess +MRSA requiring OR I&D, CLL/lymphoma s/p radiation, Alzheimer's dementia, HTN, afib, CHF brought to ER by daughter c/o left eye erythema, swelling, bloody drainage and subjective fever x10 days  Patient is on day 10 of home PO doxycyline 100mg BID  · Currently afebrile but with leukocytosis   · Per daughter, subjective fever at home   · CT facial bones with contrast:   · Subcutaneous inflammation in the left nasal region and nasolacrimal fold, contiguous with left ethmoid air cell opacification via focal dehiscence of the medial wall of the left orbit and lamina papyracea, suggesting suggesting sinogenic source of cellulitis  No evidence of abscess  · Left nasal inflammation extends into the anteromedial aspect of the left orbit  Focal dehiscence of the floor of the left orbit without evidence of direct extension of inflammation  No retrobulbar inflammation or subperiosteal abscess  · Extensive paranasal sinus disease   · Culture of left eye drainage: pending   · Empirically treat with IV Vancomycin given hx of prior MRSA infection and abscess of right eye requiring OR I&D 11/2019   · ENT and ID consults placed- appreciate input   · Blood cultures x2: pending   · Procalcitonin: negative   · Lactic acid: negative   · Monitor fever curve/hemodynamics   · Spoke at length with patient's family, they indicated they no longer are desiring transfer to [facility] where her family physician/other physicians are located at; they indicated they want abx treatment here, and when stabilized want to bring her home, no further invasive measures such as radiation  Indicated they wish to follow patient's living will     · Attending and consultants made aware  · Discussed with ENT, will obtain MRI as per recommendation  · Continue IV antibiotics this time  · Patient able to open eye, periorbital region appears less swollen on exam today  · Continue to monitor

## 2021-07-08 NOTE — ASSESSMENT & PLAN NOTE
· Present on admission   · Baseline creatinine: 0 8-1 0   · Creatine today 1 03  · Likely pre-renal in setting of dehydration and poor PO intake   · Patient's oral intake has improved as per discussion with nursing  · Hold PO lasix and potassium supplementation   · Avoid hypotension/nephrotoxins   · Urinary retention protocol, bladder scan

## 2021-07-09 PROBLEM — E87.0 HYPERNATREMIA: Status: RESOLVED | Noted: 2021-07-06 | Resolved: 2021-07-09

## 2021-07-09 PROBLEM — R79.89 ELEVATED SERUM CREATININE: Status: ACTIVE | Noted: 2021-07-06

## 2021-07-09 LAB
ANION GAP SERPL CALCULATED.3IONS-SCNC: 8 MMOL/L (ref 4–13)
BACTERIA WND AEROBE CULT: ABNORMAL
BACTERIA WND AEROBE CULT: ABNORMAL
BUN SERPL-MCNC: 24 MG/DL (ref 5–25)
CALCIUM SERPL-MCNC: 8.3 MG/DL (ref 8.3–10.1)
CHLORIDE SERPL-SCNC: 105 MMOL/L (ref 100–108)
CO2 SERPL-SCNC: 27 MMOL/L (ref 21–32)
CREAT SERPL-MCNC: 1.03 MG/DL (ref 0.6–1.3)
ERYTHROCYTE [DISTWIDTH] IN BLOOD BY AUTOMATED COUNT: 13.9 % (ref 11.6–15.1)
GFR SERPL CREATININE-BSD FRML MDRD: 46 ML/MIN/1.73SQ M
GLUCOSE SERPL-MCNC: 66 MG/DL (ref 65–140)
GRAM STN SPEC: ABNORMAL
GRAM STN SPEC: ABNORMAL
HCT VFR BLD AUTO: 34 % (ref 34.8–46.1)
HGB BLD-MCNC: 11.2 G/DL (ref 11.5–15.4)
MAGNESIUM SERPL-MCNC: 2.3 MG/DL (ref 1.6–2.6)
MCH RBC QN AUTO: 35.7 PG (ref 26.8–34.3)
MCHC RBC AUTO-ENTMCNC: 32.9 G/DL (ref 31.4–37.4)
MCV RBC AUTO: 108 FL (ref 82–98)
PLATELET # BLD AUTO: 109 THOUSANDS/UL (ref 149–390)
PMV BLD AUTO: 11.4 FL (ref 8.9–12.7)
POTASSIUM SERPL-SCNC: 3.7 MMOL/L (ref 3.5–5.3)
RBC # BLD AUTO: 3.14 MILLION/UL (ref 3.81–5.12)
SODIUM SERPL-SCNC: 140 MMOL/L (ref 136–145)
WBC # BLD AUTO: 19.11 THOUSAND/UL (ref 4.31–10.16)

## 2021-07-09 PROCEDURE — 83735 ASSAY OF MAGNESIUM: CPT | Performed by: NURSE PRACTITIONER

## 2021-07-09 PROCEDURE — 80048 BASIC METABOLIC PNL TOTAL CA: CPT | Performed by: NURSE PRACTITIONER

## 2021-07-09 PROCEDURE — 99232 SBSQ HOSP IP/OBS MODERATE 35: CPT | Performed by: NURSE PRACTITIONER

## 2021-07-09 PROCEDURE — 99233 SBSQ HOSP IP/OBS HIGH 50: CPT | Performed by: INTERNAL MEDICINE

## 2021-07-09 PROCEDURE — 85027 COMPLETE CBC AUTOMATED: CPT | Performed by: NURSE PRACTITIONER

## 2021-07-09 RX ORDER — HEPARIN SODIUM 5000 [USP'U]/ML
5000 INJECTION, SOLUTION INTRAVENOUS; SUBCUTANEOUS EVERY 12 HOURS SCHEDULED
Status: DISCONTINUED | OUTPATIENT
Start: 2021-07-09 | End: 2021-07-10

## 2021-07-09 RX ADMIN — VANCOMYCIN HYDROCHLORIDE 500 MG: 500 INJECTION, POWDER, LYOPHILIZED, FOR SOLUTION INTRAVENOUS at 09:24

## 2021-07-09 RX ADMIN — VANCOMYCIN HYDROCHLORIDE 500 MG: 500 INJECTION, POWDER, LYOPHILIZED, FOR SOLUTION INTRAVENOUS at 22:51

## 2021-07-09 RX ADMIN — POTASSIUM CHLORIDE 30 MEQ: 1500 TABLET, EXTENDED RELEASE ORAL at 13:11

## 2021-07-09 RX ADMIN — MIRTAZAPINE 30 MG: 15 TABLET, FILM COATED ORAL at 09:24

## 2021-07-09 RX ADMIN — HEPARIN SODIUM 5000 UNITS: 5000 INJECTION INTRAVENOUS; SUBCUTANEOUS at 06:01

## 2021-07-09 RX ADMIN — HEPARIN SODIUM 5000 UNITS: 5000 INJECTION INTRAVENOUS; SUBCUTANEOUS at 22:00

## 2021-07-09 RX ADMIN — FLUVOXAMINE MALEATE 100 MG: 100 TABLET ORAL at 09:24

## 2021-07-09 NOTE — PROGRESS NOTES
Progress Note - Infectious Disease   Val Connors 80 y o  female MRN: [id number]  Unit/Bed#:  [facility] Encounter: [id number]      Impression/Plan:  1  Left periorbital cellulitis-versus mass with destructive changes in the setting of CLL/lymphoma  LT cheek wound cx growing Staph aures (MSSA)  In the the past, she has had MRSA abscess and has had recurrent bouts of drainage and erythema around the eye  This seemed to respond to doxycycline     -continue vancomycin iv as inpt  -pharmacy follow-up for vancomycin trough management  -upon discharge, transition to cephalexin 500mg q6 hours thru 21  -close ENT follow-up  -eventual endoscopic surgery of the sinuses for exoneration and biopsy   -recheck CBC with diff and creatinine in am  -serial exams     2  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   This is what is felt to be causing the recurrent infection  She has been followed by ENT and previously has been treated with radiation  -workup and management as above     3  Acute kidney injury-suspect pre renal issue  No other clear source appreciated  The creatinine has improved   -dose adjust antibiotics as needed     4  Leukocytosis-suspect all related to the patient's CLL/lymphoma with lymphocytes predominating  WBC is decreasing      5  Dementia-patient currently lives with her daughter and is unable to reliable history   -level of care issues would be appropriate    Discussed the above management plan with Dr Quynh Chester from the primary service    Antibiotics:  Vancomycin 5    Subjective:  Patient remains afebrile  No report of vomiting, diarrhea or new rash per RN  Patient is tolerating vancomycin IV       Objective:  Vitals:  Temp:  [97 4 °F (36 3 °C)-97 6 °F (36 4 °C)] 97 4 °F (36 3 °C)  HR:  [57-70] 57  Resp:  [17-19] 18  BP: (101-111)/(49-54) 101/49  SpO2:  [91 %-99 %] 93 %  Temp (24hrs), Av 5 °F (36 4 °C), Min:97 4 °F (36 3 °C), Max:97 6 °F (36 4 °C)  Current: Temperature: (!) 97 4 °F (36 3 °C)    Physical Exam:   General Appearance:  Resting in recliner, no acute distress  Throat: Oropharynx moist without lesions  Lungs:   Clear to auscultation bilaterally; no wheezes, rhonchi or rales; respirations unlabored   Heart:  S1, S2, RRR; no murmur   Abdomen:   Soft, non-tender, non-distended   Extremities: No distal leg edema b/l   Skin: No new rashes or lesions  No draining wounds noted  Left periorbital region with faint erythema, mild swelling, and no drainage     Labs, Imaging, & Other studies:   All pertinent labs and imaging studies were personally reviewed  Results from last 7 days   Lab Units 07/09/21  0628 07/08/21  0550 07/07/21  0609   WBC Thousand/uL 19 11* 28 50* 16 34*   HEMOGLOBIN g/dL 11 2* 10 9* 11 3*   PLATELETS Thousands/uL 109* 102* 116*     Results from last 7 days   Lab Units 07/09/21  0628 07/08/21  0550 07/07/21  0609   SODIUM mmol/L 140 137 145   POTASSIUM mmol/L 3 7 3 8 4 2   CHLORIDE mmol/L 105 103 108   CO2 mmol/L 27 26 29   BUN mg/dL 24 28* 28*   CREATININE mg/dL 1 03 1 03 1 13   EGFR ml/min/1 73sq m 46 46 41   CALCIUM mg/dL 8 3 8 4 8 3     Results from last 7 days   Lab Units 07/05/21  2351 07/05/21  1938   BLOOD CULTURE   --  No Growth at 72 hrs  No Growth at 72 hrs     GRAM STAIN RESULT  Rare Polys  No organisms seen  --    WOUND CULTURE  Few Colonies of Staphylococcus aureus*  1+ Growth of   --      Results from last 7 days   Lab Units 07/07/21  0609 07/06/21  0821   PROCALCITONIN ng/ml <0 05 <0 05

## 2021-07-09 NOTE — PLAN OF CARE
Problem: Potential for Falls  Goal: Patient will remain free of falls  Description: INTERVENTIONS:  - Educate patient/family on patient safety including physical limitations  - Instruct patient to call for assistance with activity   - Consult OT/PT to assist with strengthening/mobility   - Keep Call bell within reach  - Keep bed low and locked with side rails adjusted as appropriate  - Keep care items and personal belongings within reach  - Initiate and maintain comfort rounds  - Make Fall Risk Sign visible to staff  - Apply yellow socks and bracelet for high fall risk patients  - Consider moving patient to room near nurses station  Outcome: Progressing     Problem: MOBILITY - ADULT  Goal: Maintain or return to baseline ADL function  Description: INTERVENTIONS:  -  Assess patient's ability to carry out ADLs; assess patient's baseline for ADL function and identify physical deficits which impact ability to perform ADLs (bathing, care of mouth/teeth, toileting, grooming, dressing, etc )  - Assess/evaluate cause of self-care deficits   - Assess range of motion  - Assess patient's mobility; develop plan if impaired  - Assess patient's need for assistive devices and provide as appropriate  - Encourage maximum independence but intervene and supervise when necessary  - Involve family in performance of ADLs  - Assess for home care needs following discharge   - Consider OT consult to assist with ADL evaluation and planning for discharge  - Provide patient education as appropriate  Outcome: Progressing  Goal: Maintains/Returns to pre admission functional level  Description: INTERVENTIONS:  - Perform BMAT or MOVE assessment daily    - Set and communicate daily mobility goal to care team and patient/family/caregiver     - Collaborate with rehabilitation services on mobility goals if consulted  - Out of bed for toileting  - Record patient progress and toleration of activity level   Outcome: Progressing     Problem: Prexisting or High Potential for Compromised Skin Integrity  Goal: Skin integrity is maintained or improved  Description: INTERVENTIONS:  - Identify patients at risk for skin breakdown  - Assess and monitor skin integrity  - Assess and monitor nutrition and hydration status  - Monitor labs   - Assess for incontinence   - Turn and reposition patient  - Assist with mobility/ambulation  - Relieve pressure over bony prominences  - Avoid friction and shearing  - Provide appropriate hygiene as needed including keeping skin clean and dry  - Evaluate need for skin moisturizer/barrier cream  - Collaborate with interdisciplinary team   - Patient/family teaching  - Consider wound care consult   Outcome: Progressing

## 2021-07-09 NOTE — PROGRESS NOTES
Ana Paula 45  Progress Note Monique Sims 12/1/1925, 80 y o  female MRN: [id number]  Unit/Bed#: [address] [place] [address] Encounter: [id number]  Primary Care Provider: Danitza Espinosa MD   Date and time admitted to hospital: 7/5/2021  5:31 PM    * Periorbital cellulitis of left eye  Assessment & Plan  81 y/o woman with pmhx of recurring right periorbital cellulitis/abscess +MRSA requiring OR I&D, CLL/lymphoma s/p radiation, Alzheimer's dementia, HTN, afib, CHF brought to ER by daughter c/o left eye erythema, swelling, bloody drainage and subjective fever x10 days  Patient is on day 10 of home PO doxycyline 100mg BID  · Left periorbital cellulitis versus mass with destructive changes in the setting CLL/lymphoma  · Currently afebrile but with leukocytosis ,improving  · Per daughter, subjective fever at home   · CT facial bones with contrast:   · Subcutaneous inflammation in the left nasal region and nasolacrimal fold, contiguous with left ethmoid air cell opacification via focal dehiscence of the medial wall of the left orbit and lamina papyracea,suggesting sinogenic source of cellulitis  No evidence of abscess  · Left nasal inflammation extends into the anteromedial aspect of the left orbit  Focal dehiscence of the floor of the left orbit without evidence of direct extension of inflammation  No retrobulbar inflammation or subperiosteal abscess  · Extensive paranasal sinus disease   · Culture of left eye drainage: MSSA  · Day 5 of IV Vancomycin given hx of prior MRSA infection and abscess of right eye requiring OR I&D 11/2019   · Defer antibiotics to ID  · ENT and ID consults placed- appreciate input   · Received Decadron 10 milligram IV q 8 hours x2 doses pre ENT  · Patient may ultimately need endoscopic sinus surgery again to exenterate the sinuses and biopsy the area  Family declining further invasive procedure    · Blood cultures x2:  No growth to date  · Procalcitonin negative x2  · Lactic acid: negative   · Discussed with ENT, recommending MRI; discussed with family; declined  · No left periorbital edema or redness on exam today  · Continue to monitor       Elevated serum creatinine  Assessment & Plan  · Baseline creatinine: 0 8-1 0   · Creatine on admission 1 23  Slightly elevated from baseline  Does not meet MINI criteria  · Creatine today 1 03  · Likely pre-renal in setting of dehydration and poor PO intake   · Patient's oral intake is poor today per RN  · Holding PO lasix and potassium supplementation   · Avoid hypotension/nephrotoxins   · Urinary retention protocol, bladder scan     A-fib [facility])  Assessment & Plan  · Rate controlled with metoprolol succinate 50mg daily   · Patient bradycardic on admission  Has not received metoprolol due to soft BP since admission  Heart rate improving  60-70's currently  · Change metoprolol to metoprolol tartrate 12 5 milligram p o  B i d  with holding parameter  · Not on anticoagulation   · Optimize electrolytes  K 3 7 today,Replete  Check Mg  Hypernatremia-resolved as of 7/9/2021  Assessment & Plan  · Suspected in the setting of dehydration and poor PO intake   · Resolved with D5 water    · Monitor BMP    Chronic congestive heart failure (HCC)  Assessment & Plan  Wt Readings from Last 3 Encounters:   07/08/21 51 9 kg (114 lb 6 7 oz)   11/06/19 76 8 kg (169 lb 6 4 oz)   11/03/19 85 3 kg (188 lb)       · No clinical evidence of acute exacerbation   · Patient appears to be dry, no signs of fluid overload   · Hold PO lasix and K supplementation   · I/O   · Daily weights          CLL (chronic lymphocytic leukemia) ([facility] [address] )  Assessment & Plan  · Patient follows with otolaryngology as outpatient   · Hx of radiation for right eye lymph node 12/2019   · Upon review of prior records, it is suggested that her CLL/lymphoma diagnosis is the underlying process for her recurring periorbital cellulitis infections which requires close monitoring by ENT or Dr Ruth Ann Grayson with otolaryngology to optimize healing of the nasolacrimal duct system/sinonasal passages   · ENT consult placed; initially patient was to be transferred to [facility], however after discussion with family they prefer for IV antibiotic treatment here, once stabilized bring patient home, no further invasive procedures as per patient's wishes/living will  · Discussed with ENT here, recommending MRI for completeness  Family declined    Dementia [facility])  Assessment & Plan  · Patient currently lives at home with daughter [facility]) and is taken care of by her and caregiver   · Continue fluvoxamine 100mg and mirtazapine 30mg daily   · PT/OT consult:  Patient is dependent  · Speech eval:  Soft food thin liquids      History of MRSA infection  Assessment & Plan  · Prior admission for right periorbital abscess treated with Doxycyline 11/2019   · Patient was discharged home with mupirocin intranasally given MRSA colonization and infection  · Per daughter, pt has PO doxycyline at home which patient would take as prophylaxis if noticed increased redness/swelling around the eyes   · Patient is on day 10 of PO doxycyline at home with no relief of eye symptoms on presentation      Hypertension  Assessment & Plan  · BP soft  · Changed metoprolol  · Goal to keep MAP > or = 65         VTE Pharmacologic Prophylaxis:   Pharmacologic:  Heparin subQ  Mechanical VTE Prophylaxis in Place: Yes    Patient Centered Rounds: I have performed bedside rounds with nursing staff today  Discussions with Specialists or Other Care Team Provider:  Yes    Education and Discussions with Family / Patient:  yes    Time Spent for Care: 20 minutes  More than 50% of total time spent on counseling and coordination of care as described above      Current Length of Stay: 4 day(s)    Current Patient Status: Inpatient   Certification Statement: The patient will continue to require additional inpatient hospital stay due to Left periorbital cellulitis    Discharge Plan:  Home with family once medically    Code Status: Level 3 - DNAR and DNI      Subjective:   Patient poor historian due to dementia  Poor oral intake per nursing  On posey belt for safety  Objective:     Vitals:   Temp (24hrs), Av 6 °F (36 4 °C), Min:97 5 °F (36 4 °C), Max:97 6 °F (36 4 °C)    Temp:  [97 5 °F (36 4 °C)-97 6 °F (36 4 °C)] 97 6 °F (36 4 °C)  HR:  [68-70] 68  Resp:  [17-19] 18  BP: (103-111)/(51-54) 103/51  SpO2:  [91 %-99 %] 91 %  Body mass index is 19 57 kg/m²  Input and Output Summary (last 24 hours):     No intake or output data in the 24 hours ending 21 1218    Physical Exam:     Physical Exam  Vitals and nursing note reviewed  Constitutional:       Appearance: She is well-developed  HENT:      Head: Normocephalic and atraumatic  Eyes:      Comments: Left periorbital no swelling or redness noted  No drainage from left eye noted  Neck:      Thyroid: No thyromegaly  Vascular: No JVD  Trachea: No tracheal deviation  Cardiovascular:      Rate and Rhythm: Normal rate and regular rhythm  Heart sounds: Normal heart sounds  Pulmonary:      Effort: Pulmonary effort is normal  No respiratory distress  Breath sounds: No wheezing or rales  Comments: Diminished breath sounds lower lobes, on O2 2 L, satting well  Abdominal:      General: Bowel sounds are normal  There is no distension  Palpations: Abdomen is soft  Tenderness: There is no abdominal tenderness  There is no guarding  Musculoskeletal:         General: No swelling or deformity  Cervical back: Neck supple  Right lower leg: No edema  Left lower leg: No edema  Skin:     General: Skin is warm and dry  Neurological:      Mental Status: She is alert  Comments: Patient asleep, respond to speech, follows simple commands    Eyes remains closed during exam    Psychiatric:         Mood and Affect: Mood normal          Judgment: Judgment normal          Additional Data:     Labs:    Results from last 7 days   Lab Units 07/09/21  0628 07/05/21  1805   WBC Thousand/uL 19 11* 21 32*   HEMOGLOBIN g/dL 11 2* 12 1   HEMATOCRIT % 34 0* 38 5   PLATELETS Thousands/uL 109* 125*   LYMPHO PCT %  --  74*   MONO PCT %  --  4   EOS PCT %  --  2     Results from last 7 days   Lab Units 07/09/21  0628   POTASSIUM mmol/L 3 7   CHLORIDE mmol/L 105   CO2 mmol/L 27   BUN mg/dL 24   CREATININE mg/dL 1 03   CALCIUM mg/dL 8 3           * I Have Reviewed All Lab Data Listed Above  * Additional Pertinent Lab Tests Reviewed: [address] Admission Reviewed    Imaging:    Imaging Reports Reviewed Today Include:  CT facial  Imaging Personally Reviewed by Myself Includes:  None    Recent Cultures (last 7 days):     Results from last 7 days   Lab Units 07/05/21  2351 07/05/21  1938   BLOOD CULTURE   --  No Growth at 72 hrs  No Growth at 72 hrs  GRAM STAIN RESULT  Rare Polys  No organisms seen  --    WOUND CULTURE  Few Colonies of Staphylococcus aureus*  1+ Growth of   --        Last 24 Hours Medication List:   Current Facility-Administered Medications   Medication Dose Route Frequency Provider Last Rate    acetaminophen  650 mg Oral Q6H PRN Irene Schulte PA-C      fluvoxaMINE  100 mg Oral Daily Irene Schulte PA-C      heparin (porcine)  5,000 Units Subcutaneous Q12H [facility] MOE Romano      metoprolol tartrate  12 5 mg Oral Q12H [facility] MOE Romano      mirtazapine  30 mg Oral Daily Irene Schulte PA-C      potassium chloride  30 mEq Oral Once MOE Romano      vancomycin  500 mg Intravenous Q12H Celeste Germain MD          Today, Patient Was Seen By: MOE Diaz    ** Please Note: Dragon 360 Dictation voice to text software may have been used in the creation of this document   **

## 2021-07-09 NOTE — PROGRESS NOTES
Vancomycin IV Pharmacy-to-Dose Consultation    Noa Pennington is a 80 y o  female who is currently receiving Vancomycin IV with management by the Pharmacy Consult service  Assessment/Plan:  The patient was reviewed  Renal function is stable and no signs or symptoms of nephrotoxicity and/or infusion reactions were documented in the chart  Based on todays assessment, continue current vancomycin (day # 6) dosing of 500 mg IV q12h, with a plan for trough to be drawn at 0930 on 07/10/2021  We will continue to follow the patients culture results and clinical progress daily      Javi Sam, Pharmacist

## 2021-07-10 ENCOUNTER — APPOINTMENT (INPATIENT)
Dept: RADIOLOGY | Facility: HOSPITAL | Age: 86
DRG: 603 | End: 2021-07-10
Payer: MEDICARE

## 2021-07-10 LAB
ANION GAP SERPL CALCULATED.3IONS-SCNC: 7 MMOL/L (ref 4–13)
BUN SERPL-MCNC: 21 MG/DL (ref 5–25)
CALCIUM SERPL-MCNC: 7.9 MG/DL (ref 8.3–10.1)
CHLORIDE SERPL-SCNC: 108 MMOL/L (ref 100–108)
CO2 SERPL-SCNC: 27 MMOL/L (ref 21–32)
CREAT SERPL-MCNC: 0.82 MG/DL (ref 0.6–1.3)
ERYTHROCYTE [DISTWIDTH] IN BLOOD BY AUTOMATED COUNT: 14.2 % (ref 11.6–15.1)
GFR SERPL CREATININE-BSD FRML MDRD: 61 ML/MIN/1.73SQ M
GLUCOSE SERPL-MCNC: 79 MG/DL (ref 65–140)
HCT VFR BLD AUTO: 32.6 % (ref 34.8–46.1)
HGB BLD-MCNC: 10.6 G/DL (ref 11.5–15.4)
MAGNESIUM SERPL-MCNC: 2.3 MG/DL (ref 1.6–2.6)
MCH RBC QN AUTO: 35.2 PG (ref 26.8–34.3)
MCHC RBC AUTO-ENTMCNC: 32.5 G/DL (ref 31.4–37.4)
MCV RBC AUTO: 108 FL (ref 82–98)
PLATELET # BLD AUTO: 98 THOUSANDS/UL (ref 149–390)
PMV BLD AUTO: 12.4 FL (ref 8.9–12.7)
POTASSIUM SERPL-SCNC: 3.6 MMOL/L (ref 3.5–5.3)
RBC # BLD AUTO: 3.01 MILLION/UL (ref 3.81–5.12)
SODIUM SERPL-SCNC: 142 MMOL/L (ref 136–145)
VANCOMYCIN TROUGH SERPL-MCNC: 21.8 UG/ML (ref 10–20)
WBC # BLD AUTO: 14.43 THOUSAND/UL (ref 4.31–10.16)

## 2021-07-10 PROCEDURE — 80048 BASIC METABOLIC PNL TOTAL CA: CPT | Performed by: NURSE PRACTITIONER

## 2021-07-10 PROCEDURE — 93005 ELECTROCARDIOGRAM TRACING: CPT

## 2021-07-10 PROCEDURE — 71045 X-RAY EXAM CHEST 1 VIEW: CPT

## 2021-07-10 PROCEDURE — 80202 ASSAY OF VANCOMYCIN: CPT | Performed by: INTERNAL MEDICINE

## 2021-07-10 PROCEDURE — 85027 COMPLETE CBC AUTOMATED: CPT | Performed by: NURSE PRACTITIONER

## 2021-07-10 PROCEDURE — 99232 SBSQ HOSP IP/OBS MODERATE 35: CPT | Performed by: NURSE PRACTITIONER

## 2021-07-10 PROCEDURE — 83735 ASSAY OF MAGNESIUM: CPT | Performed by: NURSE PRACTITIONER

## 2021-07-10 RX ORDER — POTASSIUM CHLORIDE 20 MEQ/1
40 TABLET, EXTENDED RELEASE ORAL ONCE
Status: COMPLETED | OUTPATIENT
Start: 2021-07-10 | End: 2021-07-10

## 2021-07-10 RX ORDER — SENNOSIDES 8.6 MG
1 TABLET ORAL
Status: DISCONTINUED | OUTPATIENT
Start: 2021-07-10 | End: 2021-07-11

## 2021-07-10 RX ORDER — VANCOMYCIN HYDROCHLORIDE 1 G/200ML
1000 INJECTION, SOLUTION INTRAVENOUS EVERY 24 HOURS
Status: DISCONTINUED | OUTPATIENT
Start: 2021-07-11 | End: 2021-07-12

## 2021-07-10 RX ORDER — BISACODYL 10 MG
10 SUPPOSITORY, RECTAL RECTAL ONCE
Status: COMPLETED | OUTPATIENT
Start: 2021-07-10 | End: 2021-07-10

## 2021-07-10 RX ADMIN — Medication 12.5 MG: at 11:50

## 2021-07-10 RX ADMIN — Medication 12.5 MG: at 21:12

## 2021-07-10 RX ADMIN — MIRTAZAPINE 30 MG: 15 TABLET, FILM COATED ORAL at 09:39

## 2021-07-10 RX ADMIN — HEPARIN SODIUM 5000 UNITS: 5000 INJECTION INTRAVENOUS; SUBCUTANEOUS at 09:39

## 2021-07-10 RX ADMIN — FLUVOXAMINE MALEATE 100 MG: 100 TABLET ORAL at 09:39

## 2021-07-10 RX ADMIN — BISACODYL 10 MG: 10 SUPPOSITORY RECTAL at 11:50

## 2021-07-10 RX ADMIN — STANDARDIZED SENNA CONCENTRATE 8.6 MG: 8.6 TABLET ORAL at 21:12

## 2021-07-10 RX ADMIN — POTASSIUM CHLORIDE 40 MEQ: 1500 TABLET, EXTENDED RELEASE ORAL at 11:50

## 2021-07-10 RX ADMIN — VANCOMYCIN HYDROCHLORIDE 500 MG: 500 INJECTION, POWDER, LYOPHILIZED, FOR SOLUTION INTRAVENOUS at 10:08

## 2021-07-10 RX ADMIN — VANCOMYCIN HYDROCHLORIDE 500 MG: 500 INJECTION, POWDER, LYOPHILIZED, FOR SOLUTION INTRAVENOUS at 11:49

## 2021-07-10 NOTE — PROGRESS NOTES
Vancomycin Assessment    Lamin Barreto is a 80 y o  female who is currently receiving vancomycin 500 mg IV q12h for skin-soft tissue infection     Relevant clinical data and objective history reviewed:  Creatinine   Date Value Ref Range Status   07/10/2021 0 82 0 60 - 1 30 mg/dL Final     Comment:     Standardized to IDMS reference method   07/09/2021 1 03 0 60 - 1 30 mg/dL Final     Comment:     Standardized to IDMS reference method   07/08/2021 1 03 0 60 - 1 30 mg/dL Final     Comment:     Standardized to IDMS reference method     Vancomycin Rm   Date Value Ref Range Status   07/06/2021 5 9 ug/mL Final     /58 (BP Location: Left arm)   Pulse 65   Temp 97 8 °F (36 6 °C) (Axillary)   Resp 18   Ht 5' 4" (1 626 m)   Wt 51 7 kg (114 lb)   LMP  (LMP Unknown)   SpO2 94%   BMI 19 57 kg/m²   No intake/output data recorded  Lab Results   Component Value Date/Time    BUN 21 07/10/2021 05:16 AM    WBC 14 43 (H) 07/10/2021 05:16 AM    HGB 10 6 (L) 07/10/2021 05:16 AM    HCT 32 6 (L) 07/10/2021 05:16 AM     (H) 07/10/2021 05:16 AM    PLT 98 (L) 07/10/2021 05:16 AM     Temp Readings from Last 3 Encounters:   07/10/21 97 8 °F (36 6 °C) (Axillary)   11/06/19 97 9 °F (36 6 °C)   11/03/19 97 7 °F (36 5 °C) (Tympanic)     Vancomycin Days of Therapy: 7    Assessment/Plan  The patient is currently on vancomycin utilizing scheduled dosing  Baseline risks associated with therapy include: pre-existing renal impairment, concomitant nephrotoxic medications, advanced age, and dehydration  The patient is receiving 500 mg IV q12h with the most recent vancomycin level being at steady-state and supratherapeutic based on a goal of 15-20 (appropriate for most indications) ; therefore, after clinical evaluation will be changed to Additional Vancomycin 500 mg IV x1 dose followed by 1000 mg IV q24h   Pharmacy will continue to follow closely for s/sx of nephrotoxicity, infusion reactions, and appropriateness of therapy    BMP and CBC will be ordered per protocol  Plan for trough as patient approaches steady state, prior to the 4th  dose at approximately 0930 on 7/13/21  Pharmacy will continue to follow the patients culture results and clinical progress daily      Hank Langford

## 2021-07-10 NOTE — PROGRESS NOTES
Ana Paula 45  Progress Note Blanche Moore 12/1/1925, 80 y o  female MRN: [id number]  Unit/Bed#: [address] [place] [address] Encounter: [id number]  Primary Care Provider: Jacob Mary MD   Date and time admitted to hospital: 7/5/2021  5:31 PM    * Periorbital cellulitis of left eye  Assessment & Plan  79 y/o woman with pmhx of recurring right periorbital cellulitis/abscess +MRSA requiring OR I&D 11/2019, CLL/lymphoma s/p radiation, Alzheimer's dementia, HTN, afib, CHF brought to ER by daughter c/o left eye erythema, swelling, bloody drainage and subjective fever x10 days  Patient is on day 10 of home PO doxycyline 100mg BID  · Left periorbital cellulitis versus mass with destructive changes in the setting of CLL/lymphoma  · Currently afebrile but with leukocytosis ,improving  · Per daughter, subjective fever at home   · CT facial bones with contrast:   · Subcutaneous inflammation in the left nasal region and nasolacrimal fold, contiguous with left ethmoid air cell opacification via focal dehiscence of the medial wall of the left orbit and lamina papyracea,suggesting sinogenic source of cellulitis  No evidence of abscess  · Left nasal inflammation extends into the anteromedial aspect of the left orbit  Focal dehiscence of the floor of the left orbit without evidence of direct extension of inflammation  No retrobulbar inflammation or subperiosteal abscess  · Extensive paranasal sinus disease   · Culture of left eye drainage: MSSA  · Day 6 of IV Vancomycin   · Per ID, Continue vancomycin IV as inpatient, upon discharge, transition to cephalexin 500 mg p o  Q 6 hours through 7/18/21  · ENT and ID consults placed- appreciate input   · Received Decadron 10 milligram IV q 8 hours x2 doses pre ENT  · Patient may ultimately need endoscopic sinus surgery again to exenterate the sinuses and biopsy the area  Family declining further invasive procedure    · Blood cultures x2:  No growth to date  · Procalcitonin negative x2  · Lactic acid: negative   · Discussed with ENT, recommending MRI; discussed with family; declined  · No left periorbital edema or redness on exam past 2 days  · Continue to monitor       Elevated serum creatinine  Assessment & Plan  · Baseline creatinine: 0 8-1 0   · Creatine on admission 1 23  Slightly elevated from baseline  Does not meet MINI criteria  · Creatine today 0 82  · Likely pre-renal in setting of dehydration and poor PO intake,on Lasix p o  B i d  · Patient's oral intake is poor per nursing   · Holding PO lasix and potassium supplementation   · Avoid hypotension/nephrotoxins   · Urinary retention protocol  · Bladder scan 58 mL    A-fib (HCC)  Assessment & Plan  · Rate controlled with metoprolol succinate 50mg p o  daily   · Patient bradycardic on admission  Has not received metoprolol due to soft BP since admission  Heart rate improving  60-70's currently  · Change metoprolol to metoprolol tartrate 12 5 milligram p o  B i d  with holding parameter  · Not on anticoagulation   · Optimize electrolytes  K 3 6, Mag 2 3  Will give k dur 40 p o  · Check EKG      Chronic congestive heart failure (HCC)  Assessment & Plan  Wt Readings from Last 3 Encounters:   07/09/21 51 7 kg (114 lb)   11/06/19 76 8 kg (169 lb 6 4 oz)   11/03/19 85 3 kg (188 lb)       · No clinical evidence of acute exacerbation   · Patient is on Lasix 40 mg p o  B i d  at Home  · Patient appears to be dry, no signs of fluid overload on admission  · Holding PO lasix and K supplementation   · I/O   · Daily weights  Weight stable  · Check chest x-ray to rule out CHF           CLL (chronic lymphocytic leukemia) ([facility] [address] )  Assessment & Plan  · Patient follows with otolaryngology as outpatient   · Hx of radiation for right eye lymph node 12/2019   · Upon review of prior records, it is suggested that her CLL/lymphoma diagnosis is the underlying process for her recurring periorbital cellulitis infections which requires close monitoring by ENT or Dr Canelo Conteh with otolaryngology to optimize healing of the nasolacrimal duct system/sinonasal passages   · ENT consult placed; initially patient was to be transferred to [facility], however after discussion with family they prefer for IV antibiotic treatment here, once stabilized bring patient home, no further invasive procedures as per patient's wishes/living will  · Discussed with ENT here, recommending MRI for completeness  Family declined    Dementia [facility])  Assessment & Plan  · Patient currently lives at home with daughter [facility]) and is taken care of by her and caregiver   · Continue fluvoxamine 100mg and mirtazapine 30mg daily   · PT/OT consult:  Patient is dependent  · Speech eval:  Soft food thin liquids      History of MRSA infection  Assessment & Plan  · Prior admission for right periorbital abscess treated with Doxycyline 11/2019   · Patient was discharged home with mupirocin intranasally given MRSA colonization and infection  · Per daughter, pt has PO doxycyline at home which patient would take as prophylaxis if noticed increased redness/swelling around the eyes   · Patient is on day 10 of PO doxycyline at home with no relief of eye symptoms on presentation      Hypertension  Assessment & Plan  · BP improved  · Changed metoprolol  · Goal to keep MAP > or = 65         VTE Pharmacologic Prophylaxis:   Pharmacologic: Pharmacologic VTE Prophylaxis contraindicated due to Thrombocytopenia  Mechanical VTE Prophylaxis in Place: Yes    Patient Centered Rounds: I have performed bedside rounds with nursing staff today  Discussions with Specialists or Other Care Team Provider:  Yes    Education and Discussions with Family / Patient:  Yes    Time Spent for Care: 20 minutes  More than 50% of total time spent on counseling and coordination of care as described above      Current Length of Stay: 5 day(s)    Current Patient Status: Inpatient   Certification Statement: The patient will continue to require additional inpatient hospital stay due to Left periorbital cellulitis    Discharge Plan:  Home on Monday if continues to improve and clear by ID    Code Status: Level 3 - DNAR and DNI      Subjective:   Patient poor historian to dementia  No BM since admission per nursing  Objective:     Vitals:   Temp (24hrs), Av 5 °F (36 4 °C), Min:97 4 °F (36 3 °C), Max:97 8 °F (36 6 °C)    Temp:  [97 4 °F (36 3 °C)-97 8 °F (36 6 °C)] 97 8 °F (36 6 °C)  HR:  [57-73] 65  Resp:  [18] 18  BP: (101-143)/(49-89) 127/58  SpO2:  [93 %-95 %] 94 %  Body mass index is 19 57 kg/m²  Input and Output Summary (last 24 hours): Intake/Output Summary (Last 24 hours) at 7/10/2021 1118  Last data filed at 7/10/2021 0726  Gross per 24 hour   Intake --   Output 300 ml   Net -300 ml       Physical Exam:     Physical Exam  Vitals and nursing note reviewed  Constitutional:       Appearance: She is well-developed  HENT:      Head: Normocephalic and atraumatic  Eyes:      Comments: No drainage from left eye  No periorbital redness  Neck:      Thyroid: No thyromegaly  Vascular: No JVD  Trachea: No tracheal deviation  Cardiovascular:      Rate and Rhythm: Normal rate  Rhythm irregular  Heart sounds: Normal heart sounds  Pulmonary:      Effort: Pulmonary effort is normal  No respiratory distress  Breath sounds: No wheezing or rales  Comments: Diminished breath sounds bilateral, room air, satting low 90s  Poor assessment due to noncompliance  Abdominal:      General: Bowel sounds are normal  There is no distension  Palpations: Abdomen is soft  Tenderness: There is no abdominal tenderness  There is no guarding  Musculoskeletal:         General: No swelling or deformity  Cervical back: Neck supple  Right lower leg: No edema  Left lower leg: No edema  Skin:     General: Skin is warm and dry     Neurological:      General: No focal deficit present  Mental Status: She is alert  Comments: Patient asleep, response to speech, eyes closed, follows very simple commands  Psychiatric:         Mood and Affect: Mood normal          Judgment: Judgment normal          Additional Data:     Labs:    Results from last 7 days   Lab Units 07/10/21  0516 07/05/21  1805   WBC Thousand/uL 14 43* 21 32*   HEMOGLOBIN g/dL 10 6* 12 1   HEMATOCRIT % 32 6* 38 5   PLATELETS Thousands/uL 98* 125*   LYMPHO PCT %  --  74*   MONO PCT %  --  4   EOS PCT %  --  2     Results from last 7 days   Lab Units 07/10/21  0516   POTASSIUM mmol/L 3 6   CHLORIDE mmol/L 108   CO2 mmol/L 27   BUN mg/dL 21   CREATININE mg/dL 0 82   CALCIUM mg/dL 7 9*           * I Have Reviewed All Lab Data Listed Above  * Additional Pertinent Lab Tests Reviewed: [address] Admission Reviewed    Imaging:    Imaging Reports Reviewed Today Include:  None  Imaging Personally Reviewed by Myself Includes:  None    Recent Cultures (last 7 days):     Results from last 7 days   Lab Units 07/05/21  2351 07/05/21  1938   BLOOD CULTURE   --  No Growth After 4 Days  No Growth After 4 Days     GRAM STAIN RESULT  Rare Polys  No organisms seen  --    WOUND CULTURE  Few Colonies of Staphylococcus aureus*  1+ Growth of   --        Last 24 Hours Medication List:   Current Facility-Administered Medications   Medication Dose Route Frequency Provider Last Rate    acetaminophen  650 mg Oral Q6H PRN Doylene TUNDE Malcolm      fluvoxaMINE  100 mg Oral Daily Doylene TUNDE Malcolm      metoprolol tartrate  12 5 mg Oral Q12H [address] MOE Romano      mirtazapine  30 mg Oral Daily Val Malcolm PA-C      potassium chloride  40 mEq Oral Once MOE Romano      vancomycin  500 mg Intravenous Once Kimberly Marcos MD      [START ON 7/11/2021] vancomycin  1,000 mg Intravenous Q24H Kimberly Marcos MD          Today, Patient Was Seen By: MOE Lanier    ** Please Note: Dragon 360 Dictation voice to text software may have been used in the creation of this document   **

## 2021-07-10 NOTE — ASSESSMENT & PLAN NOTE
· Patient follows with otolaryngology as outpatient   · Hx of radiation for right eye lymph node 12/2019   · Upon review of prior records, it is suggested that her CLL/lymphoma diagnosis is the underlying process for her recurring periorbital cellulitis infections which requires close monitoring by ENT or Dr Joanne Dc with otolaryngology to optimize healing of the nasolacrimal duct system/sinonasal passages   · ENT consult placed; initially patient was to be transferred to [facility], however after discussion with family they prefer for IV antibiotic treatment here, once stabilized bring patient home, no further invasive procedures as per patient's wishes/living will  · Discussed with ENT here, recommending MRI for completeness    Family declined

## 2021-07-10 NOTE — ASSESSMENT & PLAN NOTE
· Baseline creatinine: 0 8-1 0   · Creatine on admission 1 23  Slightly elevated from baseline  Does not meet MINI criteria  · Creatine today 0 82  · Likely pre-renal in setting of dehydration and poor PO intake,on Lasix p o  B i d    · Patient's oral intake is poor per nursing   · Holding PO lasix and potassium supplementation   · Avoid hypotension/nephrotoxins   · Urinary retention protocol  · Bladder scan 58 mL

## 2021-07-10 NOTE — ASSESSMENT & PLAN NOTE
Wt Readings from Last 3 Encounters:   07/09/21 51 7 kg (114 lb)   11/06/19 76 8 kg (169 lb 6 4 oz)   11/03/19 85 3 kg (188 lb)       · No clinical evidence of acute exacerbation   · Patient is on Lasix 40 mg p o  B i d  at Home  · Patient appears to be dry, no signs of fluid overload on admission  · Holding PO lasix and K supplementation   · I/O   · Daily weights  Weight stable  · Check chest x-ray to rule out CHF

## 2021-07-10 NOTE — ASSESSMENT & PLAN NOTE
· Patient currently lives at home with daughter [facility]) and is taken care of by her and caregiver   · Continue fluvoxamine 100mg and mirtazapine 30mg daily   · PT/OT consult:  Patient is dependent  · Speech eval:  Soft food thin liquids

## 2021-07-10 NOTE — ASSESSMENT & PLAN NOTE
· Rate controlled with metoprolol succinate 50mg p o  daily   · Patient bradycardic on admission  Has not received metoprolol due to soft BP since admission  Heart rate improving  60-70's currently  · Change metoprolol to metoprolol tartrate 12 5 milligram p o  B i d  with holding parameter  · Not on anticoagulation   · Optimize electrolytes  K 3 6, Mag 2 3  Will give k dur 40 p o    · Check EKG

## 2021-07-10 NOTE — ASSESSMENT & PLAN NOTE
81 y/o woman with pmhx of recurring right periorbital cellulitis/abscess +MRSA requiring OR I&D 11/2019, CLL/lymphoma s/p radiation, Alzheimer's dementia, HTN, afib, CHF brought to ER by daughter c/o left eye erythema, swelling, bloody drainage and subjective fever x10 days  Patient is on day 10 of home PO doxycyline 100mg BID  · Left periorbital cellulitis versus mass with destructive changes in the setting of CLL/lymphoma  · Currently afebrile but with leukocytosis ,improving  · Per daughter, subjective fever at home   · CT facial bones with contrast:   · Subcutaneous inflammation in the left nasal region and nasolacrimal fold, contiguous with left ethmoid air cell opacification via focal dehiscence of the medial wall of the left orbit and lamina papyracea,suggesting sinogenic source of cellulitis  No evidence of abscess  · Left nasal inflammation extends into the anteromedial aspect of the left orbit  Focal dehiscence of the floor of the left orbit without evidence of direct extension of inflammation  No retrobulbar inflammation or subperiosteal abscess  · Extensive paranasal sinus disease   · Culture of left eye drainage: MSSA  · Day 6 of IV Vancomycin   · Per ID, Continue vancomycin IV as inpatient, upon discharge, transition to cephalexin 500 mg p o  Q 6 hours through 7/18/21  · ENT and ID consults placed- appreciate input   · Received Decadron 10 milligram IV q 8 hours x2 doses pre ENT  · Patient may ultimately need endoscopic sinus surgery again to exenterate the sinuses and biopsy the area  Family declining further invasive procedure  · Blood cultures x2:  No growth to date  · Procalcitonin negative x2  · Lactic acid: negative   · Discussed with ENT, recommending MRI; discussed with family; declined     · No left periorbital edema or redness on exam past 2 days  · Continue to monitor

## 2021-07-11 LAB
ANION GAP SERPL CALCULATED.3IONS-SCNC: 4 MMOL/L (ref 4–13)
BACTERIA BLD CULT: NORMAL
BACTERIA BLD CULT: NORMAL
BASOPHILS # BLD MANUAL: 0 THOUSAND/UL (ref 0–0.1)
BASOPHILS NFR MAR MANUAL: 0 % (ref 0–1)
BUN SERPL-MCNC: 16 MG/DL (ref 5–25)
CALCIUM SERPL-MCNC: 8.1 MG/DL (ref 8.3–10.1)
CHLORIDE SERPL-SCNC: 111 MMOL/L (ref 100–108)
CO2 SERPL-SCNC: 28 MMOL/L (ref 21–32)
CREAT SERPL-MCNC: 0.9 MG/DL (ref 0.6–1.3)
EOSINOPHIL # BLD MANUAL: 0.13 THOUSAND/UL (ref 0–0.4)
EOSINOPHIL NFR BLD MANUAL: 1 % (ref 0–6)
ERYTHROCYTE [DISTWIDTH] IN BLOOD BY AUTOMATED COUNT: 14.3 % (ref 11.6–15.1)
GFR SERPL CREATININE-BSD FRML MDRD: 55 ML/MIN/1.73SQ M
GLUCOSE SERPL-MCNC: 71 MG/DL (ref 65–140)
HCT VFR BLD AUTO: 35.5 % (ref 34.8–46.1)
HGB BLD-MCNC: 11.3 G/DL (ref 11.5–15.4)
LYMPHOCYTES # BLD AUTO: 11.39 THOUSAND/UL (ref 0.6–4.47)
LYMPHOCYTES # BLD AUTO: 86 % (ref 14–44)
MACROCYTES BLD QL AUTO: PRESENT
MAGNESIUM SERPL-MCNC: 2.3 MG/DL (ref 1.6–2.6)
MCH RBC QN AUTO: 35.1 PG (ref 26.8–34.3)
MCHC RBC AUTO-ENTMCNC: 31.8 G/DL (ref 31.4–37.4)
MCV RBC AUTO: 110 FL (ref 82–98)
MONOCYTES # BLD AUTO: 0 THOUSAND/UL (ref 0–1.22)
MONOCYTES NFR BLD: 0 % (ref 4–12)
NEUTROPHILS # BLD MANUAL: 1.59 THOUSAND/UL (ref 1.85–7.62)
NEUTS BAND NFR BLD MANUAL: 1 % (ref 0–8)
NEUTS SEG NFR BLD AUTO: 11 % (ref 43–75)
NRBC BLD AUTO-RTO: 0 /100 WBCS
PLATELET # BLD AUTO: 104 THOUSANDS/UL (ref 149–390)
PLATELET BLD QL SMEAR: ABNORMAL
PMV BLD AUTO: 12.4 FL (ref 8.9–12.7)
POTASSIUM SERPL-SCNC: 4.4 MMOL/L (ref 3.5–5.3)
RBC # BLD AUTO: 3.22 MILLION/UL (ref 3.81–5.12)
RBC MORPH BLD: PRESENT
SMUDGE CELLS BLD QL SMEAR: PRESENT
SODIUM SERPL-SCNC: 143 MMOL/L (ref 136–145)
TOTAL CELLS COUNTED SPEC: 100
VARIANT LYMPHS # BLD AUTO: 1 %
WBC # BLD AUTO: 13.24 THOUSAND/UL (ref 4.31–10.16)

## 2021-07-11 PROCEDURE — 85027 COMPLETE CBC AUTOMATED: CPT | Performed by: NURSE PRACTITIONER

## 2021-07-11 PROCEDURE — 83735 ASSAY OF MAGNESIUM: CPT | Performed by: NURSE PRACTITIONER

## 2021-07-11 PROCEDURE — 99232 SBSQ HOSP IP/OBS MODERATE 35: CPT | Performed by: NURSE PRACTITIONER

## 2021-07-11 PROCEDURE — 80048 BASIC METABOLIC PNL TOTAL CA: CPT | Performed by: NURSE PRACTITIONER

## 2021-07-11 PROCEDURE — 85007 BL SMEAR W/DIFF WBC COUNT: CPT | Performed by: NURSE PRACTITIONER

## 2021-07-11 RX ORDER — SENNOSIDES 8.6 MG
1 TABLET ORAL
Status: DISCONTINUED | OUTPATIENT
Start: 2021-07-11 | End: 2021-07-12 | Stop reason: HOSPADM

## 2021-07-11 RX ADMIN — Medication 12.5 MG: at 08:57

## 2021-07-11 RX ADMIN — MIRTAZAPINE 30 MG: 15 TABLET, FILM COATED ORAL at 08:57

## 2021-07-11 RX ADMIN — FLUVOXAMINE MALEATE 100 MG: 100 TABLET ORAL at 09:02

## 2021-07-11 RX ADMIN — VANCOMYCIN HYDROCHLORIDE 1000 MG: 1 INJECTION, SOLUTION INTRAVENOUS at 09:03

## 2021-07-11 NOTE — ASSESSMENT & PLAN NOTE
· Patient follows with otolaryngology as outpatient   · Hx of radiation for right eye lymph node 12/2019   · Upon review of prior records, it is suggested that her CLL/lymphoma diagnosis is the underlying process for her recurring periorbital cellulitis infections which requires close monitoring by ENT or Dr Michele Schwab with otolaryngology to optimize healing of the nasolacrimal duct system/sinonasal passages   · ENT consult placed; initially patient was to be transferred to [facility], however after discussion with family they prefer for IV antibiotic treatment here, once stabilized bring patient home, no further invasive procedures as per patient's wishes/living will  · Discussed with ENT here, recommending MRI for completeness    Family declined

## 2021-07-11 NOTE — PROGRESS NOTES
Vancomycin IV Pharmacy-to-Dose Consultation    Neo Suh is a 80 y o  female who is currently receiving Vancomycin IV with management by the Pharmacy Consult service  Assessment/Plan:  The patient was reviewed  Renal function is stable and no signs or symptoms of nephrotoxicity and/or infusion reactions were documented in the chart  Based on todays assessment, continue current vancomycin (day # 8) dosing of 1000 mg IV q24h, with a plan for trough to be drawn at 0930 on 7/13/21  We will continue to follow the patients culture results and clinical progress daily      Lester Stout, Pharmacist

## 2021-07-11 NOTE — ASSESSMENT & PLAN NOTE
Wt Readings from Last 3 Encounters:   07/11/21 51 1 kg (112 lb 10 5 oz)   11/06/19 76 8 kg (169 lb 6 4 oz)   11/03/19 85 3 kg (188 lb)       · No clinical evidence of acute exacerbation   · Patient is on Lasix 40 mg p o  B i d  at Home  · Patient appears euvolemic on exam, no signs of fluid overload on admission  · Holding PO lasix and K supplementation   · I/O   · Daily weights  No weight gain since admission  · Chest x-ray 7/10 no acute findings  · Recommend decrease Lasix to 20 mg p o  Daily p r n  On Discharge

## 2021-07-11 NOTE — ASSESSMENT & PLAN NOTE
· Baseline creatinine: 0 8-1 0   · Creatine on admission 1 23  Slightly elevated from baseline  Does not meet MINI criteria  · Creatine today 0 90  · Likely pre-renal in setting of dehydration and poor PO intake,on Lasix p o  B i d    · Patient's oral intake is improving past 2 days  · Holding PO lasix and potassium supplementation   · Avoid hypotension/nephrotoxins   · Urinary retention protocol  · Bladder scan 58 mL

## 2021-07-11 NOTE — PROGRESS NOTES
Ana Paula 45  Progress Note Sonny Cash 12/1/1925, 80 y o  female MRN: [id number]  Unit/Bed#: [address] [place] [address] Encounter: [id number]  Primary Care Provider: Chapin Diaz MD   Date and time admitted to hospital: 7/5/2021  5:31 PM    * Periorbital cellulitis of left eye  Assessment & Plan  81 y/o woman with pmhx of recurring right periorbital cellulitis/abscess +MRSA requiring OR I&D 11/2019, CLL/lymphoma s/p radiation, Alzheimer's dementia, HTN, afib, CHF brought to ER by daughter c/o left eye erythema, swelling, bloody drainage and subjective fever x10 days  Patient is on day 10 of home PO doxycyline 100mg BID  · Left periorbital cellulitis versus mass with destructive changes in the setting of CLL/lymphoma  · Currently afebrile but with leukocytosis ,improving  · Per daughter, subjective fever at home   · CT facial bones with contrast:   · Subcutaneous inflammation in the left nasal region and nasolacrimal fold, contiguous with left ethmoid air cell opacification via focal dehiscence of the medial wall of the left orbit and lamina papyracea,suggesting sinogenic source of cellulitis  No evidence of abscess  · Left nasal inflammation extends into the anteromedial aspect of the left orbit  Focal dehiscence of the floor of the left orbit without evidence of direct extension of inflammation  No retrobulbar inflammation or subperiosteal abscess  · Extensive paranasal sinus disease   · Culture of left eye drainage: MSSA  · Day 7 of IV Vancomycin   · Per ID, Continue vancomycin IV as inpatient, upon discharge, transition to cephalexin 500 mg p o  Q 6 hours through 7/18/21  · ENT and ID consults placed- appreciate input   · Received Decadron 10 milligram IV q 8 hours x2 doses pre ENT  · Patient may ultimately need endoscopic sinus surgery again to exenterate the sinuses and biopsy the area  Family declining further invasive procedure    · Blood cultures x2:  No growth after 5 days   · Procalcitonin negative x2  · Lactic acid: negative   · Discussed with ENT, recommending MRI; discussed with family; declined  · No left periorbital edema or redness on exam past 3 days  · Continue to monitor       Elevated serum creatinine  Assessment & Plan  · Baseline creatinine: 0 8-1 0   · Creatine on admission 1 23  Slightly elevated from baseline  Does not meet MINI criteria  · Creatine today 0 90  · Likely pre-renal in setting of dehydration and poor PO intake,on Lasix p o  B i d  · Patient's oral intake is improving past 2 days  · Holding PO lasix and potassium supplementation   · Avoid hypotension/nephrotoxins   · Urinary retention protocol  · Bladder scan 58 mL    A-fib (HCC)  Assessment & Plan  · Rate controlled with metoprolol succinate 50mg p o  daily at home  · Not on AC at home  · Patient bradycardic on admission  Had not received metoprolol due to soft BP since admission  Heart rate improving  60-70's currently  · Changed metoprolol to metoprolol tartrate 12 5 milligram p o  B i d  with holding parameter on 7/10  · EKG showed sinus Alda Guardado, 50s  · Optimize electrolytes  Chronic congestive heart failure (HCC)  Assessment & Plan  Wt Readings from Last 3 Encounters:   07/11/21 51 1 kg (112 lb 10 5 oz)   11/06/19 76 8 kg (169 lb 6 4 oz)   11/03/19 85 3 kg (188 lb)       · No clinical evidence of acute exacerbation   · Patient is on Lasix 40 mg p o  B i d  at Home  · Patient appears euvolemic on exam, no signs of fluid overload on admission  · Holding PO lasix and K supplementation   · I/O   · Daily weights  No weight gain since admission  · Chest x-ray 7/10 no acute findings  · Recommend decrease Lasix to 20 mg p o  Daily p r n  On Discharge           CLL (chronic lymphocytic leukemia) ([facility] [address] )  Assessment & Plan  · Patient follows with otolaryngology as outpatient   · Hx of radiation for right eye lymph node 12/2019   · Upon review of prior records, it is suggested that her CLL/lymphoma diagnosis is the underlying process for her recurring periorbital cellulitis infections which requires close monitoring by ENT or Dr Elodia Carver with otolaryngology to optimize healing of the nasolacrimal duct system/sinonasal passages   · ENT consult placed; initially patient was to be transferred to [facility], however after discussion with family they prefer for IV antibiotic treatment here, once stabilized bring patient home, no further invasive procedures as per patient's wishes/living will  · Discussed with ENT here, recommending MRI for completeness  Family declined    Dementia [facility])  Assessment & Plan  · Patient currently lives at home with daughter [facility]) and is taken care of by her and caregiver   · Continue fluvoxamine 100mg and mirtazapine 30mg daily   · PT/OT consult:  Patient is dependent  · Speech eval:  Soft food thin liquids  · Family requesting home with VNA  Notified cm  History of MRSA infection  Assessment & Plan  · Prior admission for right periorbital abscess treated with Doxycyline 11/2019   · Patient was discharged home with mupirocin intranasally given MRSA colonization and infection  · Per daughter, pt has PO doxycyline at home which patient would take as prophylaxis if noticed increased redness/swelling around the eyes   · Patient is on day 10 of PO doxycyline at home with no relief of eye symptoms on presentation      Hypertension  Assessment & Plan  · BP improved  · Changed metoprolol  · Goal to keep MAP > or = 65         VTE Pharmacologic Prophylaxis:   Pharmacologic: Pharmacologic VTE Prophylaxis contraindicated due to Thrombocytopenia  Mechanical VTE Prophylaxis in Place: Yes    Patient Centered Rounds: I have performed bedside rounds with nursing staff today  Discussions with Specialists or Other Care Team Provider:  Yes    Education and Discussions with Family / Patient:  Yes    Time Spent for Care: 20 minutes    More than 50% of total time spent on counseling and coordination of care as described above  Current Length of Stay: 6 day(s)    Current Patient Status: Inpatient   Certification Statement: The patient will continue to require additional inpatient hospital stay due to Left periorbital cellulitis    Discharge Plan:  Home with VNA in a m  If clears by ID and remained stable    Code Status: Level 3 - DNAR and DNI      Subjective:   Patient poor historian due to dementia  Denies pain  No issues per nursing    Objective:     Vitals:   Temp (24hrs), Av 7 °F (36 5 °C), Min:97 5 °F (36 4 °C), Max:98 °F (36 7 °C)    Temp:  [97 5 °F (36 4 °C)-98 °F (36 7 °C)] 98 °F (36 7 °C)  HR:  [52-60] 52  Resp:  [16-18] 16  BP: (105-128)/(42-68) 105/42  SpO2:  [93 %-97 %] 93 %  Body mass index is 19 34 kg/m²  Input and Output Summary (last 24 hours): Intake/Output Summary (Last 24 hours) at 2021 1531  Last data filed at 2021 1179  Gross per 24 hour   Intake --   Output 200 ml   Net -200 ml       Physical Exam:     Physical Exam  Vitals and nursing note reviewed  Constitutional:       Appearance: She is well-developed  HENT:      Head: Normocephalic and atraumatic  Eyes:      Comments: No drainage to eyes  No left periorbital redness  Neck:      Thyroid: No thyromegaly  Vascular: No JVD  Trachea: No tracheal deviation  Cardiovascular:      Rate and Rhythm: Regular rhythm  Bradycardia present  Heart sounds: Normal heart sounds  Pulmonary:      Effort: Pulmonary effort is normal  No respiratory distress  Breath sounds: No wheezing or rales  Comments: Breath sounds clear diminished bilateral lower lobes, on room air, respirations easy  Abdominal:      General: Bowel sounds are normal  There is no distension  Palpations: Abdomen is soft  Tenderness: There is no abdominal tenderness  There is no guarding  Musculoskeletal:         General: No swelling or deformity  Cervical back: Neck supple  Right lower leg: No edema  Left lower leg: No edema  Skin:     General: Skin is warm and dry  Neurological:      General: No focal deficit present  Mental Status: She is alert  Comments: Oriented to self, eyes open to speech this morning, follows simple commands  Psychiatric:         Mood and Affect: Mood normal          Judgment: Judgment normal          Additional Data:     Labs:    Results from last 7 days   Lab Units 07/11/21  0454   WBC Thousand/uL 13 24*   HEMOGLOBIN g/dL 11 3*   HEMATOCRIT % 35 5   PLATELETS Thousands/uL 104*   LYMPHO PCT % 86*   MONO PCT % 0*   EOS PCT % 1     Results from last 7 days   Lab Units 07/11/21  0454   POTASSIUM mmol/L 4 4   CHLORIDE mmol/L 111*   CO2 mmol/L 28   BUN mg/dL 16   CREATININE mg/dL 0 90   CALCIUM mg/dL 8 1*           * I Have Reviewed All Lab Data Listed Above  * Additional Pertinent Lab Tests Reviewed: [address] 66 Admission Reviewed    Imaging:    Imaging Reports Reviewed Today Include:  None  Imaging Personally Reviewed by Myself Includes:  None    Recent Cultures (last 7 days):     Results from last 7 days   Lab Units 07/05/21  2351 07/05/21  1938   BLOOD CULTURE   --  No Growth After 5 Days  No Growth After 5 Days     GRAM STAIN RESULT  Rare Polys  No organisms seen  --    WOUND CULTURE  Few Colonies of Staphylococcus aureus*  1+ Growth of   --        Last 24 Hours Medication List:   Current Facility-Administered Medications   Medication Dose Route Frequency Provider Last Rate    acetaminophen  650 mg Oral Q6H PRN Aparna Monet PA-C      fluvoxaMINE  100 mg Oral Daily Aparna Monet PA-C      metoprolol tartrate  12 5 mg Oral Q12H [address] MOE Romano      mirtazapine  30 mg Oral Daily Aparna Monet PA-C      senna  1 tablet Oral HS MOE Romano      vancomycin  1,000 mg Intravenous Q24H Vladimir Mcgill MD 1,000 mg (07/11/21 1068)        Today, Patient Was Seen By: Leonela Boyle MOE    ** Please Note: Dragon 360 Dictation voice to text software may have been used in the creation of this document   **

## 2021-07-11 NOTE — ASSESSMENT & PLAN NOTE
· Rate controlled with metoprolol succinate 50mg p o  daily at home  · Not on AC at home  · Patient bradycardic on admission  Had not received metoprolol due to soft BP since admission  Heart rate improving  60-70's currently  · Changed metoprolol to metoprolol tartrate 12 5 milligram p o  B i d  with holding parameter on 7/10  · EKG showed sinus Breanne Matter, 50s  · Optimize electrolytes

## 2021-07-11 NOTE — ASSESSMENT & PLAN NOTE
81 y/o woman with pmhx of recurring right periorbital cellulitis/abscess +MRSA requiring OR I&D 11/2019, CLL/lymphoma s/p radiation, Alzheimer's dementia, HTN, afib, CHF brought to ER by daughter c/o left eye erythema, swelling, bloody drainage and subjective fever x10 days  Patient is on day 10 of home PO doxycyline 100mg BID  · Left periorbital cellulitis versus mass with destructive changes in the setting of CLL/lymphoma  · Currently afebrile but with leukocytosis ,improving  · Per daughter, subjective fever at home   · CT facial bones with contrast:   · Subcutaneous inflammation in the left nasal region and nasolacrimal fold, contiguous with left ethmoid air cell opacification via focal dehiscence of the medial wall of the left orbit and lamina papyracea,suggesting sinogenic source of cellulitis  No evidence of abscess  · Left nasal inflammation extends into the anteromedial aspect of the left orbit  Focal dehiscence of the floor of the left orbit without evidence of direct extension of inflammation  No retrobulbar inflammation or subperiosteal abscess  · Extensive paranasal sinus disease   · Culture of left eye drainage: MSSA  · Day 7 of IV Vancomycin   · Per ID, Continue vancomycin IV as inpatient, upon discharge, transition to cephalexin 500 mg p o  Q 6 hours through 7/18/21  · ENT and ID consults placed- appreciate input   · Received Decadron 10 milligram IV q 8 hours x2 doses pre ENT  · Patient may ultimately need endoscopic sinus surgery again to exenterate the sinuses and biopsy the area  Family declining further invasive procedure  · Blood cultures x2:  No growth after 5 days  · Procalcitonin negative x2  · Lactic acid: negative   · Discussed with ENT, recommending MRI; discussed with family; declined     · No left periorbital edema or redness on exam past 3 days  · Continue to monitor

## 2021-07-11 NOTE — ASSESSMENT & PLAN NOTE
· Patient currently lives at home with daughter [facility]) and is taken care of by her and caregiver   · Continue fluvoxamine 100mg and mirtazapine 30mg daily   · PT/OT consult:  Patient is dependent  · Speech eval:  Soft food thin liquids  · Family requesting home with VNA  Notified cm

## 2021-07-12 VITALS
WEIGHT: 121.47 LBS | BODY MASS INDEX: 20.74 KG/M2 | OXYGEN SATURATION: 98 % | HEIGHT: 64 IN | RESPIRATION RATE: 18 BRPM | DIASTOLIC BLOOD PRESSURE: 64 MMHG | HEART RATE: 62 BPM | SYSTOLIC BLOOD PRESSURE: 125 MMHG | TEMPERATURE: 98.4 F

## 2021-07-12 LAB
ALBUMIN SERPL BCP-MCNC: 2.5 G/DL (ref 3.5–5)
ALP SERPL-CCNC: 80 U/L (ref 46–116)
ALT SERPL W P-5'-P-CCNC: 50 U/L (ref 12–78)
ANION GAP SERPL CALCULATED.3IONS-SCNC: 7 MMOL/L (ref 4–13)
AST SERPL W P-5'-P-CCNC: 46 U/L (ref 5–45)
BILIRUB SERPL-MCNC: 0.6 MG/DL (ref 0.2–1)
BUN SERPL-MCNC: 13 MG/DL (ref 5–25)
CALCIUM ALBUM COR SERPL-MCNC: 9.4 MG/DL (ref 8.3–10.1)
CALCIUM SERPL-MCNC: 8.2 MG/DL (ref 8.3–10.1)
CHLORIDE SERPL-SCNC: 109 MMOL/L (ref 100–108)
CO2 SERPL-SCNC: 27 MMOL/L (ref 21–32)
CREAT SERPL-MCNC: 0.79 MG/DL (ref 0.6–1.3)
ERYTHROCYTE [DISTWIDTH] IN BLOOD BY AUTOMATED COUNT: 14.4 % (ref 11.6–15.1)
GFR SERPL CREATININE-BSD FRML MDRD: 64 ML/MIN/1.73SQ M
GLUCOSE SERPL-MCNC: 79 MG/DL (ref 65–140)
HCT VFR BLD AUTO: 32.4 % (ref 34.8–46.1)
HGB BLD-MCNC: 10.4 G/DL (ref 11.5–15.4)
MAGNESIUM SERPL-MCNC: 2.2 MG/DL (ref 1.6–2.6)
MCH RBC QN AUTO: 35.4 PG (ref 26.8–34.3)
MCHC RBC AUTO-ENTMCNC: 32.1 G/DL (ref 31.4–37.4)
MCV RBC AUTO: 110 FL (ref 82–98)
PLATELET # BLD AUTO: 97 THOUSANDS/UL (ref 149–390)
PMV BLD AUTO: 11.8 FL (ref 8.9–12.7)
POTASSIUM SERPL-SCNC: 4.6 MMOL/L (ref 3.5–5.3)
PROT SERPL-MCNC: 5 G/DL (ref 6.4–8.2)
RBC # BLD AUTO: 2.94 MILLION/UL (ref 3.81–5.12)
SODIUM SERPL-SCNC: 143 MMOL/L (ref 136–145)
WBC # BLD AUTO: 13.56 THOUSAND/UL (ref 4.31–10.16)

## 2021-07-12 PROCEDURE — 85027 COMPLETE CBC AUTOMATED: CPT | Performed by: NURSE PRACTITIONER

## 2021-07-12 PROCEDURE — 99239 HOSP IP/OBS DSCHRG MGMT >30: CPT | Performed by: NURSE PRACTITIONER

## 2021-07-12 PROCEDURE — 80053 COMPREHEN METABOLIC PANEL: CPT | Performed by: NURSE PRACTITIONER

## 2021-07-12 PROCEDURE — 99232 SBSQ HOSP IP/OBS MODERATE 35: CPT | Performed by: INTERNAL MEDICINE

## 2021-07-12 PROCEDURE — 83735 ASSAY OF MAGNESIUM: CPT | Performed by: NURSE PRACTITIONER

## 2021-07-12 RX ORDER — FUROSEMIDE 40 MG/1
20 TABLET ORAL DAILY
Refills: 0
Start: 2021-07-12

## 2021-07-12 RX ORDER — CEPHALEXIN 500 MG/1
500 CAPSULE ORAL EVERY 6 HOURS SCHEDULED
Qty: 24 CAPSULE | Refills: 0 | Status: SHIPPED | OUTPATIENT
Start: 2021-07-12 | End: 2021-07-18

## 2021-07-12 RX ORDER — CEPHALEXIN 500 MG/1
500 CAPSULE ORAL EVERY 8 HOURS SCHEDULED
Status: DISCONTINUED | OUTPATIENT
Start: 2021-07-12 | End: 2021-07-12 | Stop reason: HOSPADM

## 2021-07-12 RX ADMIN — Medication 12.5 MG: at 09:02

## 2021-07-12 RX ADMIN — CEPHALEXIN 500 MG: 500 CAPSULE ORAL at 15:46

## 2021-07-12 RX ADMIN — FLUVOXAMINE MALEATE 100 MG: 100 TABLET ORAL at 09:03

## 2021-07-12 RX ADMIN — VANCOMYCIN HYDROCHLORIDE 1000 MG: 1 INJECTION, SOLUTION INTRAVENOUS at 09:02

## 2021-07-12 RX ADMIN — MIRTAZAPINE 30 MG: 15 TABLET, FILM COATED ORAL at 09:02

## 2021-07-12 NOTE — ASSESSMENT & PLAN NOTE
· BP was low initially  · Changed metoprolol to 12 5 mg po q 12 hours as she was also noted to have bradycardia initially  · Called PCP and left message with changes     · Follow up outpatient PCP

## 2021-07-12 NOTE — ASSESSMENT & PLAN NOTE
· Prior admission for right periorbital abscess treated with Doxycyline 11/2019   · Patient was discharged home with mupirocin intranasally given MRSA colonization and infection    · Per daughter, pt has PO doxycyline at home which patient would take as prophylaxis if noticed increased redness/swelling around the eyes   · Patient was on day 10 of PO doxycyline at home with no relief of eye symptoms on presentation

## 2021-07-12 NOTE — PROGRESS NOTES
Vancomycin IV Pharmacy-to-Dose Consultation    Noa Pennington is a 80 y o  female who is currently receiving Vancomycin IV with management by the Pharmacy Consult service  Assessment/Plan:  The patient was reviewed  Renal function is stable and no signs or symptoms of nephrotoxicity and/or infusion reactions were documented in the chart  Based on todays assessment, continue current vancomycin (day # 9) dosing of 1000 mg IV q24h, with a plan for trough to be drawn at 0930 on 7/13/21  We will continue to follow the patients culture results and clinical progress daily      Zofia Kidd, Pharmacist

## 2021-07-12 NOTE — ASSESSMENT & PLAN NOTE
· Rate controlled with metoprolol succinate 50mg p o  daily at home  · Not on AC at home  · Patient bradycardic on admission  Had not received metoprolol due to soft BP since admission  Heart rate improving  60-70's currently  · Changed metoprolol to metoprolol tartrate 12 5 milligram p o  B i d  with holding parameter on 7/10  · EKG showed sinus Barb Hamming, 50s  · Optimize electrolytes

## 2021-07-12 NOTE — ASSESSMENT & PLAN NOTE
· Patient currently lives at home with daughter [facility]) and is taken care of by her and caregiver   · Continue fluvoxamine 100mg and mirtazapine 30mg daily   · PT/OT consult:  Patient is dependent  · Speech eval:  Soft food thin liquids  · Family requesting home with VNA  Notified cm

## 2021-07-12 NOTE — ASSESSMENT & PLAN NOTE
· Patient follows with otolaryngology as outpatient   · Hx of radiation for right eye lymph node 12/2019   · Upon review of prior records, it is suggested that her CLL/lymphoma diagnosis is the underlying process for her recurring periorbital cellulitis infections which requires close monitoring by ENT or Dr Barnes Para with otolaryngology to optimize healing of the nasolacrimal duct system/sinonasal passages   · ENT consult placed; initially patient was to be transferred to [facility], however after discussion with family they prefer for IV antibiotic treatment here, once stabilized bring patient home, no further invasive procedures as per patient's wishes/living will  · Discussed with ENT here, recommending MRI for completeness    Family declined

## 2021-07-12 NOTE — NURSING NOTE
Patient discharged via [facility] to home with son and daughter in law  IV and all [place] pieces were removed and all belongings were sent with the patient

## 2021-07-12 NOTE — PLAN OF CARE
Problem: Potential for Falls  Goal: Patient will remain free of falls  Description: INTERVENTIONS:  - Educate patient/family on patient safety including physical limitations  - Instruct patient to call for assistance with activity   - Consult OT/PT to assist with strengthening/mobility   - Keep Call bell within reach  - Keep bed low and locked with side rails adjusted as appropriate  - Keep care items and personal belongings within reach  - Initiate and maintain comfort rounds  - Make Fall Risk Sign visible to staff  - Offer Toileting every 2 Hours, in advance of need  - Initiate/Maintain bed alarm  - Obtain necessary fall risk management equipment: none, bedrest   - Apply yellow socks and bracelet for high fall risk patients  - Consider moving patient to room near nurses station  Outcome: Progressing     Problem: MOBILITY - ADULT  Goal: Maintain or return to baseline ADL function  Description: INTERVENTIONS:  -  Assess patient's ability to carry out ADLs; assess patient's baseline for ADL function and identify physical deficits which impact ability to perform ADLs (bathing, care of mouth/teeth, toileting, grooming, dressing, etc )  - Assess/evaluate cause of self-care deficits   - Assess range of motion  - Assess patient's mobility; develop plan if impaired  - Assess patient's need for assistive devices and provide as appropriate  - Encourage maximum independence but intervene and supervise when necessary  - Involve family in performance of ADLs  - Assess for home care needs following discharge   - Consider OT consult to assist with ADL evaluation and planning for discharge  - Provide patient education as appropriate  Outcome: Progressing  Goal: Maintains/Returns to pre admission functional level  Description: INTERVENTIONS:  - Perform BMAT or MOVE assessment daily    - Set and communicate daily mobility goal to care team and patient/family/caregiver     - Collaborate with rehabilitation services on mobility goals if consulted  - Perform Range of Motion 3 times a day  - Reposition patient every 2 hours    - Dangle patient 3 times a day  - Stand patient 2 times a day  - Ambulate patient 3 times a day  - Out of bed to chair 2 times a day   - Out of bed for meals 3 times a day  - Out of bed for toileting  - Record patient progress and toleration of activity level   Outcome: Progressing     Problem: Prexisting or High Potential for Compromised Skin Integrity  Goal: Skin integrity is maintained or improved  Description: INTERVENTIONS:  - Identify patients at risk for skin breakdown  - Assess and monitor skin integrity  - Assess and monitor nutrition and hydration status  - Monitor labs   - Assess for incontinence   - Turn and reposition patient  - Assist with mobility/ambulation  - Relieve pressure over bony prominences  - Avoid friction and shearing  - Provide appropriate hygiene as needed including keeping skin clean and dry  - Evaluate need for skin moisturizer/barrier cream  - Collaborate with interdisciplinary team   - Patient/family teaching  - Consider wound care consult   Outcome: Progressing

## 2021-07-12 NOTE — CONSULTS
The patient's vancomycin therapy has been discontinued  Pharmacy will sign off now, thank you for this consult  Honey Humphreys, MarielyD

## 2021-07-12 NOTE — PROGRESS NOTES
Pastoral Care Progress Note    2021  Patient: Baldev Gudino : 1925  Admission Date & Time: 2021 1731  MRN: [id number] CSN: [id number]                     Chaplaincy Interventions Utilized:   Relationship Building: Cultivated a relationship of care and support  Patient suffers from significant dementia and I was unable to have a conversation with her  She was moving a great deal in her bed and positioned at an angle  I let the nurse know about her seeming uncomfortableness         21 1300   Clinical Encounter Type   Visited With Patient   Routine Visit Introduction   Referral To   (census/rounds)

## 2021-07-12 NOTE — ASSESSMENT & PLAN NOTE
81 y/o woman with pmhx of recurring right periorbital cellulitis/abscess +MRSA requiring OR I&D 11/2019, CLL/lymphoma s/p radiation, Alzheimer's dementia, HTN, afib, CHF brought to ER by daughter c/o left eye erythema, swelling, bloody drainage and subjective fever x10 days  Patient was on day 10 of home PO doxycyline 100mg BID  · Left periorbital cellulitis versus mass with destructive changes in the setting of CLL/lymphoma  · Currently afebrile but with leukocytosis ,improving  · Per daughter, subjective fever at home   · CT facial bones with contrast:   · Subcutaneous inflammation in the left nasal region and nasolacrimal fold, contiguous with left ethmoid air cell opacification via focal dehiscence of the medial wall of the left orbit and lamina papyracea,suggesting sinogenic source of cellulitis  No evidence of abscess  · Left nasal inflammation extends into the anteromedial aspect of the left orbit  Focal dehiscence of the floor of the left orbit without evidence of direct extension of inflammation  No retrobulbar inflammation or subperiosteal abscess  · Extensive paranasal sinus disease   · Culture of left eye drainage: MSSA  ·  Completed day 7 of IV Vancomycin, transitioned to oral keflex through 7/18 as per discussion with ID  · ENT and ID consults placed- appreciate input   · Received Decadron 10 milligram IV q 8 hours x2 doses per ENT  · Patient may ultimately need endoscopic sinus surgery again to exenterate the sinuses and biopsy the area as per recommendation from ENT  Family declining further invasive procedure  · Blood cultures x2:  No growth after 5 days  · Procalcitonin negative x2  · Lactic acid: negative   · Discussed with ENT, recommending MRI; discussed with family; declined     · No left periorbital edema or redness on exam  · Patient will discharge to home with VNA services

## 2021-07-12 NOTE — PROGRESS NOTES
Progress Note - Infectious Disease   Ernie Berger 80 y o  female MRN: [id number]  Unit/Bed#:  [facility] Encounter: [id number]      Impression/Plan:  1  Left periorbital cellulitis-versus mass with destructive changes in the setting of CLL/lymphoma   LT cheek wound cx growing Staph aures (MSSA)  In the the past, she has had MRSA abscess and has had recurrent bouts of drainage and erythema around the eye  This seemed to respond to doxycycline     -discontinue vancomycin  -cephalexin 500mg q 8 hours thru 21  -close ENT follow-up  -eventual endoscopic surgery of the sinuses for exoneration and biopsy   -recheck CBC with diff and creatinine in am  -serial exams     2  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   This is what is felt to be causing the recurrent infection   She has been followed by ENT and previously has been treated with radiation  -workup and management as above     3  Acute kidney injury-suspect pre renal issue   No other clear source appreciated   The creatinine has improved   -dose adjust antibiotics as needed     4  Leukocytosis-suspect all related to the patient's CLL/lymphoma with lymphocytes predominating  WBC is decreasing      5  Dementia-patient currently lives with her daughter and is unable to reliable history   -level of care issues would be appropriate    Discussed the above management plan in detail with the primary service    Antibiotics:  Vancomycin 8    Subjective:  Patient has no fever, chills, sweats; no nausea, vomiting, diarrhea; no cough, shortness of breath; no pain  No new symptoms      Objective:  Vitals:  Temp:  [97 8 °F (36 6 °C)-98 3 °F (36 8 °C)] 98 3 °F (36 8 °C)  HR:  [51-68] 64  Resp:  [17-18] 18  BP: (101-133)/(42-72) 132/68  SpO2:  [93 %-96 %] 93 %  Temp (24hrs), Av 1 °F (36 7 °C), Min:97 8 °F (36 6 °C), Max:98 3 °F (36 8 °C)  Current: Temperature: 98 3 °F (36 8 °C)    Physical Exam:   General Appearance:  Elderly, debilitated, interactive, nontoxic, no acute distress  No periorbital erythema or drainage   Throat: Oropharynx moist without lesions  Lungs:   Clear to auscultation bilaterally; no wheezes, rhonchi or rales; respirations unlabored   Heart:  RRR; no murmur, rub or gallop   Abdomen:   Soft, non-tender, non-distended, positive bowel sounds  Extremities: No clubbing, cyanosis or edema   Skin: No new rashes or lesions  No draining wounds noted  Labs, Imaging, & Other studies:   All pertinent labs and imaging studies were personally reviewed  Results from last 7 days   Lab Units 07/12/21  0605 07/11/21  0454 07/10/21  0516   WBC Thousand/uL 13 56* 13 24* 14 43*   HEMOGLOBIN g/dL 10 4* 11 3* 10 6*   PLATELETS Thousands/uL 97* 104* 98*     Results from last 7 days   Lab Units 07/12/21  0605 07/11/21  0454 07/10/21  0516   SODIUM mmol/L 143 143 142   POTASSIUM mmol/L 4 6 4 4 3 6   CHLORIDE mmol/L 109* 111* 108   CO2 mmol/L 27 28 27   BUN mg/dL 13 16 21   CREATININE mg/dL 0 79 0 90 0 82   EGFR ml/min/1 73sq m 64 55 61   CALCIUM mg/dL 8 2* 8 1* 7 9*   AST U/L 46*  --   --    ALT U/L 50  --   --    ALK PHOS U/L 80  --   --      Results from last 7 days   Lab Units 07/05/21  2351 07/05/21  1938   BLOOD CULTURE   --  No Growth After 5 Days  No Growth After 5 Days     GRAM STAIN RESULT  Rare Polys  No organisms seen  --    WOUND CULTURE  Few Colonies of Staphylococcus aureus*  1+ Growth of   --      Results from last 7 days   Lab Units 07/07/21  0609 07/06/21  0821   PROCALCITONIN ng/ml <0 05 <0 05

## 2021-07-12 NOTE — DISCHARGE INSTRUCTIONS
Cellulitis   WHAT YOU NEED TO KNOW:   Cellulitis is a skin infection caused by bacteria  Cellulitis may go away on its own or you may need treatment  Your healthcare provider may draw a [place] around the outside edges of your cellulitis  If your cellulitis spreads, your healthcare provider will see it outside of the [place]  DISCHARGE INSTRUCTIONS:   Call 911 if:   · You have sudden trouble breathing or chest pain  Seek care immediately if:   · Your wound gets larger and more painful  · You feel a crackling under your skin when you touch it  · You have purple dots or bumps on your skin, or you see bleeding under your skin  · You have new swelling and pain in your legs  · The red, warm, swollen area gets larger  · You see red streaks coming from the infected area  Contact your healthcare provider if:   · You have a fever  · Your fever or pain does not go away or gets worse  · The area does not get smaller after 2 days of antibiotics  · Your skin is flaking or peeling off  · You have questions or concerns about your condition or care  Medicines:   · Antibiotics  help treat the bacterial infection  · NSAIDs , such as ibuprofen, help decrease swelling, pain, and fever  NSAIDs can cause stomach bleeding or kidney problems in certain people  If you take blood thinner medicine, always ask if NSAIDs are safe for you  Always read the medicine label and follow directions  Do not give these medicines to children under 10months of age without direction from your child's healthcare provider  · Acetaminophen  decreases pain and fever  It is available without a doctor's order  Ask how much to take and how often to take it  Follow directions  Read the labels of all other medicines you are using to see if they also contain acetaminophen, or ask your doctor or pharmacist  Acetaminophen can cause liver damage if not taken correctly   Do not use more than 4 grams (4,000 milligrams) total of acetaminophen in one day  · Take your medicine as directed  Contact your healthcare provider if you think your medicine is not helping or if you have side effects  Tell him or her if you are allergic to any medicine  Keep a list of the medicines, vitamins, and herbs you take  Include the amounts, and when and why you take them  Bring the list or the pill bottles to follow-up visits  Carry your medicine list with you in case of an emergency  Self-care:   · Elevate the area above the level of your heart  as often as you can  This will help decrease swelling and pain  Prop the area on pillows or blankets to keep it elevated comfortably  · Clean the area daily until the wound scabs over  Gently wash the area with soap and water  Pat dry  Use dressings as directed  · Place cool or warm, wet cloths on the area as directed  Use clean cloths and clean water  Leave it on the area until the cloth is room temperature  Pat the area dry with a clean, dry cloth  The cloths may help decrease pain  Prevent cellulitis:   · Do not scratch bug bites or areas of injury  You increase your risk for cellulitis by scratching these areas  · Do not share personal items, such as towels, clothing, and razors  · Clean exercise equipment  with germ-killing  before and after you use it  · Wash your hands often  Use soap and water  Wash your hands after you use the bathroom, change a child's diapers, or sneeze  Wash your hands before you prepare or eat food  Use lotion to prevent dry, cracked skin  · Wear pressure stockings as directed  You may be told to wear the stockings if you have peripheral edema  The stockings improve blood flow and decrease swelling  · Treat athlete's foot  This can help prevent the spread of a bacterial skin infection  Follow up with your healthcare provider within 3 days, or as directed:   Your healthcare provider will check if your cellulitis is getting better  You may need different medicine  Write down your questions so you remember to ask them during your visits  © Copyright [address] Information is for End User's use only and may not be sold, redistributed or otherwise used for commercial purposes  All illustrations and images included in CareNotes® are the copyrighted property of A D A M , Inc  or [employer]  The above information is an  only  It is not intended as medical advice for individual conditions or treatments  Talk to your doctor, nurse or pharmacist before following any medical regimen to see if it is safe and effective for you

## 2021-07-12 NOTE — CASE MANAGEMENT
CM updated per physician, patient is medically stable for discharge home today  CM spoke with patient's daughter in law Sarbjit Fischer on the phone, [phone]  CM introduced self and role  [place] received medical update  CM discussed transportation at discharge  Patient's family requesting CM set up transport  CM verified patient's discharge address is [place] [id number]  DIL stated there is a ramped entrance on side of home off [facility]  DIL will be at the home to open the door  CM will f/u with DIL re:transport time  CM discussed VNA at discharge  Sarbjit Fischer requesting Home PT/OT at discharge  No preference for VNA  CM sent referral to [facility] [address]  Waiting for response  CM sent referral to [facility]  Waiting for transport time  CM reviewed with patient's daughter in law Sarbjit Fischer re:IMM  BECCA understands her Medicare rights  No other questions/concerns noted  IMM reviewed with patient's caregiver   patient's caregiver agree with discharge determination

## 2021-07-12 NOTE — ASSESSMENT & PLAN NOTE
· Baseline creatinine: 0 8-1 0   · Creatine on admission 1 23  Slightly elevated from baseline  Does not meet MINI criteria  · Creatine today 0 79  · Likely pre-renal in setting of dehydration and poor PO intake,on Lasix p o  B i d    · Patient's oral intake is improving past 2 days  · Initially held PO lasix and potassium supplementation   · Resumed lower dosage of po lasix and metoprolol

## 2021-07-12 NOTE — ASSESSMENT & PLAN NOTE
Wt Readings from Last 3 Encounters:   07/12/21 55 1 kg (121 lb 7 6 oz)   11/06/19 76 8 kg (169 lb 6 4 oz)   11/03/19 85 3 kg (188 lb)       · No clinical evidence of acute exacerbation   · Patient is on Lasix 40 mg p o  B i d  at Home  · Patient appears euvolemic on exam, no signs of fluid overload on admission  · Was holding PO lasix and K supplementation initially  · I/O   · Daily weights  No weight gain since admission  · Chest x-ray 7/10 no acute findings  · Recommend decrease Lasix to 20 mg p o   Daily   · Communicated this to primary provider's office

## 2021-07-12 NOTE — DISCHARGE SUMMARY
Ana Paula 45  Discharge- [facility] 12/1/1925, 80 y o  female MRN: [id number]  Unit/Bed#: [address] [place] [address] Encounter: [id number]  Primary Care Provider: Wilda Dang MD   Date and time admitted to hospital: 7/5/2021  5:31 PM    * Periorbital cellulitis of left eye  Assessment & Plan  81 y/o woman with pmhx of recurring right periorbital cellulitis/abscess +MRSA requiring OR I&D 11/2019, CLL/lymphoma s/p radiation, Alzheimer's dementia, HTN, afib, CHF brought to ER by daughter c/o left eye erythema, swelling, bloody drainage and subjective fever x10 days  Patient was on day 10 of home PO doxycyline 100mg BID  · Left periorbital cellulitis versus mass with destructive changes in the setting of CLL/lymphoma  · Currently afebrile but with leukocytosis ,improving  · Per daughter, subjective fever at home   · CT facial bones with contrast:   · Subcutaneous inflammation in the left nasal region and nasolacrimal fold, contiguous with left ethmoid air cell opacification via focal dehiscence of the medial wall of the left orbit and lamina papyracea,suggesting sinogenic source of cellulitis  No evidence of abscess  · Left nasal inflammation extends into the anteromedial aspect of the left orbit  Focal dehiscence of the floor of the left orbit without evidence of direct extension of inflammation  No retrobulbar inflammation or subperiosteal abscess  · Extensive paranasal sinus disease   · Culture of left eye drainage: MSSA  ·  Completed day 7 of IV Vancomycin, transitioned to oral keflex through 7/18 as per discussion with ID  · ENT and ID consults placed- appreciate input   · Received Decadron 10 milligram IV q 8 hours x2 doses per ENT  · Patient may ultimately need endoscopic sinus surgery again to exenterate the sinuses and biopsy the area as per recommendation from ENT  Family declining further invasive procedure  · Blood cultures x2:  No growth after 5 days    · Procalcitonin negative x2  · Lactic acid: negative   · Discussed with ENT, recommending MRI; discussed with family; declined  · No left periorbital edema or redness on exam  · Patient will discharge to home with VNA services        Hypertension  Assessment & Plan  · BP was low initially  · Changed metoprolol to 12 5 mg po q 12 hours as she was also noted to have bradycardia initially  · Called PCP and left message with changes  · Follow up outpatient PCP    Chronic congestive heart failure (HCC)  Assessment & Plan  Wt Readings from Last 3 Encounters:   07/12/21 55 1 kg (121 lb 7 6 oz)   11/06/19 76 8 kg (169 lb 6 4 oz)   11/03/19 85 3 kg (188 lb)       · No clinical evidence of acute exacerbation   · Patient is on Lasix 40 mg p o  B i d  at Home  · Patient appears euvolemic on exam, no signs of fluid overload on admission  · Was holding PO lasix and K supplementation initially  · I/O   · Daily weights  No weight gain since admission  · Chest x-ray 7/10 no acute findings  · Recommend decrease Lasix to 20 mg p o  Daily   · Communicated this to primary provider's office         A-fib [facility])  Assessment & Plan  · Rate controlled with metoprolol succinate 50mg p o  daily at home  · Not on AC at home  · Patient bradycardic on admission  Had not received metoprolol due to soft BP since admission  Heart rate improving  60-70's currently  · Changed metoprolol to metoprolol tartrate 12 5 milligram p o  B i d  with holding parameter on 7/10  · EKG showed sinus Kalen Merck, 50s  · Optimize electrolytes  History of MRSA infection  Assessment & Plan  · Prior admission for right periorbital abscess treated with Doxycyline 11/2019   · Patient was discharged home with mupirocin intranasally given MRSA colonization and infection    · Per daughter, pt has PO doxycyline at home which patient would take as prophylaxis if noticed increased redness/swelling around the eyes   · Patient was on day 10 of PO doxycyline at home with no relief of eye symptoms on presentation      Dementia [facility])  Assessment & Plan  · Patient currently lives at home with daughter [facility]) and is taken care of by her and caregiver   · Continue fluvoxamine 100mg and mirtazapine 30mg daily   · PT/OT consult:  Patient is dependent  · Speech eval:  Soft food thin liquids  · Family requesting home with VNA  Notified cm  Elevated serum creatinine  Assessment & Plan  · Baseline creatinine: 0 8-1 0   · Creatine on admission 1 23  Slightly elevated from baseline  Does not meet MINI criteria  · Creatine today 0 79  · Likely pre-renal in setting of dehydration and poor PO intake,on Lasix p o  B i d  · Patient's oral intake is improving past 2 days  · Initially held PO lasix and potassium supplementation   · Resumed lower dosage of po lasix and metoprolol    CLL (chronic lymphocytic leukemia) ([facility] [address] )  Assessment & Plan  · Patient follows with otolaryngology as outpatient   · Hx of radiation for right eye lymph node 12/2019   · Upon review of prior records, it is suggested that her CLL/lymphoma diagnosis is the underlying process for her recurring periorbital cellulitis infections which requires close monitoring by ENT or Dr Jan Vale with otolaryngology to optimize healing of the nasolacrimal duct system/sinonasal passages   · ENT consult placed; initially patient was to be transferred to [facility], however after discussion with family they prefer for IV antibiotic treatment here, once stabilized bring patient home, no further invasive procedures as per patient's wishes/living will  · Discussed with ENT here, recommending MRI for completeness    Family declined        Discharging Physician / Practitioner: MOE Garcia  PCP: Julienne Brewer MD  Admission Date:   Admission Orders (From admission, onward)     Ordered        07/05/21 2352  Inpatient Admission  Once                   Discharge Date: 07/12/21    Medical Problems     Resolved Problems  Date Reviewed: 7/12/2021        Resolved    Hypernatremia 7/9/2021     Resolved by  Kevon Reid, [facility] Stay:  · ID  · ENT    Procedures Performed:     · None     Significant Findings / Test Results:     · CT facial bones showed subq inflammation in left nasal region and nasolacrimal fold , suggestive sinogenic source of cellulitis, no abscess, left nasal inflammation extends into the anteromedial aspect of the left orbit  Focal dehiscence floor of the left orbit without evidence of direct extension of inflammation  No retrobulbar inflammation or subperiosteal abscess  Extensive paranasal sinus disease as per radiology report  · Chest x ray showed no acute cardiopulmonary disease as per radiology report  Incidental Findings:   · None      Test Results Pending at Discharge (will require follow up): · None     Outpatient Tests Requested:  · None     Complications:  None     Reason for Admission: Periorbital cellulitis     Hospital Course:     Neo Suh is a 80 y o  female patient PMH of recurring right periorbital cellulitis/abcess + MRSA requiring OR and I&D, CLL/lymphoma status post radiation, dementia, CHF, hypertension and atrial fibrillation who originally presented to the hospital on 7/5/2021 due to left eye erythema with swelling and bloody drainage and subjective fevers for 10 days prior to admission  Patient had been getting intermittent doxycycline for these symptoms, however patient did not improve with doxycycline this time  She presented to the emergency department for further evaluation treatment  Patient was noted to be afebrile with some bradycardia  Labs showed significant elevation of white count with lymphocytes  CT of facial bones showed subcutaneous inflammation left nasal region and nasal lacrimal fold, suggestive sign a genic source of cellulitis, no abscess    Left nasal inflammation extends into the anterior medial aspect of the left orbit, focal dehiscence of the floor of left orbit without evidence of direct extension of inflammation, no retro bulbar inflammation or subperiosteal abscess, extensive paranasal sinus disease as per radiology report  ENT was consulted, extensive discussion with family regarding patient's care  Initially they indicated they wanted to transfer to [facility], however then elected for antibiotic therapy here at [address], get patient back to baseline and have patient return to her home environment without aggressive interventions including further surgery or radiation  Wound culture ultimately grew few colonies of Staphylococcus aureus, patient received 7 days of IV vancomycin and is transitioned to oral Keflex as per discussion with ID, to continue through 7/18  PT/OT evaluation and treatment were performed, patient is dependent and will return to home environment with VNA services  Case management was consulted for this  Patient was also seen by speech during hospitalization, continue with regular consistency food and thin liquids  This was discussed with the patient's daughter-in-law Ally Cabral via phone, she verbalized understanding and is agreeable to the patient is discharged home today  Please see above list of diagnoses and related plan for additional information  Condition at Discharge: good     Discharge Day Visit / Exam:     Subjective:  Patient seen sitting up in bed, resting quietly  She is pleasant and cooperative, uncertain of where she is at    Nursing reported that patient had very good appetite and ate her entire meal, no nausea or vomiting afterwards    Vitals: Blood Pressure: 132/68 (07/12/21 0726)  Pulse: 64 (07/12/21 0726)  Temperature: 98 3 °F (36 8 °C) (07/12/21 0726)  Temp Source: Axillary (07/11/21 0813)  Respirations: 18 (07/12/21 0726)  Height: 5' 4" (162 6 cm) (07/05/21 1724)  Weight - Scale: 55 1 kg (121 lb 7 6 oz) (07/12/21 0600)  SpO2: 93 % (07/12/21 0726)  Exam:   Physical Exam  Vitals and nursing note reviewed  Constitutional:       General: She is not in acute distress  HENT:      Head: Normocephalic  Nose: Nose normal       Mouth/Throat:      Mouth: Mucous membranes are moist    Eyes:      Extraocular Movements: Extraocular movements intact  Cardiovascular:      Rate and Rhythm: Normal rate  Rhythm irregular  Pulses: Normal pulses  Pulmonary:      Effort: Pulmonary effort is normal       Breath sounds: Normal breath sounds  Abdominal:      General: Bowel sounds are normal  There is no distension  Palpations: Abdomen is soft  Tenderness: There is no abdominal tenderness  Genitourinary:     Comments: Voiding spontaneously  Musculoskeletal:      Cervical back: Normal range of motion  Right lower leg: No edema  Left lower leg: No edema  Skin:     General: Skin is warm and dry  Capillary Refill: Capillary refill takes less than 2 seconds  Coloration: Skin is pale  Neurological:      Mental Status: She is alert  Comments: Not able to tell me her location, the date  Was able to tell me her name  Psychiatric:      Comments: Pleasant and cooperative        Discussion with Family:  I spoke with patient's daughter-in-law patch via phone to provide her with an update, all questions have been answered to the best of my abilities  Discharge instructions/Information to patient and family:   See after visit summary for information provided to patient and family  Provisions for Follow-Up Care:  See after visit summary for information related to follow-up care and any pertinent home health orders  Disposition:     Home with VNA Services (Reminder: Complete face to face encounter)    For Discharges to [facility] SNF:   · Not Applicable to this Patient - Not Applicable to this Patient    Planned Readmission:  No     Discharge Statement:  I spent greater than 30 minutes discharging the patient   This time was spent on the day of discharge  I had direct contact with the patient on the day of discharge  Greater than 50% of the total time was spent examining patient, answering all patient questions, arranging and discussing plan of care with patient as well as directly providing post-discharge instructions  Additional time then spent on discharge activities  Discharge Medications:  See after visit summary for reconciled discharge medications provided to patient and family        ** Please Note: This note has been constructed using a voice recognition system **

## 2021-07-13 LAB
ATRIAL RATE: 23 BPM
ATRIAL RATE: 29 BPM
QRS AXIS: 53 DEGREES
QRS AXIS: 70 DEGREES
QRSD INTERVAL: 86 MS
QRSD INTERVAL: 96 MS
QT INTERVAL: 490 MS
QT INTERVAL: 526 MS
QTC INTERVAL: 485 MS
QTC INTERVAL: 529 MS
T WAVE AXIS: -39 DEGREES
T WAVE AXIS: 160 DEGREES
VENTRICULAR RATE: 59 BPM
VENTRICULAR RATE: 61 BPM

## 2021-07-13 PROCEDURE — 93010 ELECTROCARDIOGRAM REPORT: CPT | Performed by: INTERNAL MEDICINE

## 2023-01-24 NOTE — CASE MANAGEMENT
CM updated by AIDAN  Patient's transport time is 1700 with [facility]  CM updated nursing with transport time  CM updated patient's daughter in law Pat with transport time of 1700 with [facility]  Pt states MRI department needs to know the color (blue or white) of \"linx magnetic implant\" placed by Dr. Thomas on 08/19/2022 for Robotic hiatal hernia repair with Magnetic Sphincter Augmentation (size 16). MRI is also needing official name of linx magnetic implant to ensure pt can get completed MRI. Pt mentions there is a book that Zoraida Martino had during an office visit with information for implant. Pt would like a phone call back as soon as possible.

## (undated) DEVICE — CULTURE TUBE ANAEROBIC

## (undated) DEVICE — 3000CC GUARDIAN II: Brand: GUARDIAN

## (undated) DEVICE — GLOVE SRG BIOGEL 7.5

## (undated) DEVICE — NEEDLE 25G X 1 1/2

## (undated) DEVICE — TUBING SUCTION 5MM X 12 FT

## (undated) DEVICE — TIBURON SPLIT SHEET: Brand: CONVERTORS

## (undated) DEVICE — INSTRUMENT TRACKER 9733533XOM ENT 1PK

## (undated) DEVICE — PACK UNIVERSAL NECK

## (undated) DEVICE — SCD SEQUENTIAL COMPRESSION COMFORT SLEEVE MEDIUM KNEE LENGTH: Brand: KENDALL SCD

## (undated) DEVICE — NEURO PATTIES 1/2 X 3

## (undated) DEVICE — NEEDLE SPINAL 22G X 3.5IN  QUINCKE

## (undated) DEVICE — MAYO STAND COVER: Brand: CONVERTORS

## (undated) DEVICE — BULB SYRINGE,IRRIGATION WITH PROTECTIVE CAP: Brand: DOVER

## (undated) DEVICE — CULTURE TUBE AEROBIC

## (undated) DEVICE — ELECTRODE BLADE MOD E-Z CLEAN  2.75IN 7CM -0012AM

## (undated) DEVICE — INTENDED FOR TISSUE SEPARATION, AND OTHER PROCEDURES THAT REQUIRE A SHARP SURGICAL BLADE TO PUNCTURE OR CUT.: Brand: BARD-PARKER ® CARBON RIB-BACK BLADES

## (undated) DEVICE — ANTI-FOG SOLUTION WITH FOAM PAD: Brand: DEVON

## (undated) DEVICE — PATIENT TRACKER 9734887XOM NON-INVASIVE